# Patient Record
Sex: FEMALE | Race: BLACK OR AFRICAN AMERICAN | Employment: UNEMPLOYED | URBAN - METROPOLITAN AREA
[De-identification: names, ages, dates, MRNs, and addresses within clinical notes are randomized per-mention and may not be internally consistent; named-entity substitution may affect disease eponyms.]

---

## 2017-04-24 RX ORDER — METFORMIN HYDROCHLORIDE 500 MG/1
TABLET, EXTENDED RELEASE ORAL
Qty: 120 TAB | Refills: 4 | Status: SHIPPED | OUTPATIENT
Start: 2017-04-24 | End: 2018-07-09 | Stop reason: CLARIF

## 2017-04-24 NOTE — TELEPHONE ENCOUNTER
She has not been since since Aug 2012 and I do not plan to refill this unless she schedules an office visit. Will need a 40 min slot as it's been 5 years since I saw her and I can give her refills to last until the appointment.

## 2017-04-24 NOTE — TELEPHONE ENCOUNTER
Patient called to ask for a prescription to be called into her pharmacy for Metformin, due to PCOS. She can be reached at:  [phone]. [facility]  [address]  [address] [address] [facility] [phone]  Metformin

## 2017-07-15 ENCOUNTER — HOSPITAL ENCOUNTER (EMERGENCY)
Age: 37
Discharge: STILL ADMITTED OR ADMINISTRATIVE | End: 2017-07-15
Attending: EMERGENCY MEDICINE
Payer: COMMERCIAL

## 2017-07-15 PROCEDURE — 75810000275 HC EMERGENCY DEPT VISIT NO LEVEL OF CARE

## 2018-06-14 ENCOUNTER — HOSPITAL ENCOUNTER (OUTPATIENT)
Dept: ULTRASOUND IMAGING | Age: 38
Discharge: HOME | End: 2018-06-14
Attending: INTERNAL MEDICINE
Payer: COMMERCIAL

## 2018-06-14 DIAGNOSIS — Z83.71 FAMILY HISTORY OF COLONIC POLYPS: ICD-10-CM

## 2018-06-14 DIAGNOSIS — R10.11 RUQ ABDOMINAL PAIN: ICD-10-CM

## 2018-06-14 DIAGNOSIS — D50.9 IDA (IRON DEFICIENCY ANEMIA): ICD-10-CM

## 2018-06-14 PROCEDURE — 76700 US EXAM ABDOM COMPLETE: CPT

## 2018-06-21 ENCOUNTER — OFFICE VISIT (OUTPATIENT)
Dept: SURGERY | Age: 38
End: 2018-06-21

## 2018-06-21 VITALS
RESPIRATION RATE: 18 BRPM | TEMPERATURE: 98.3 F | OXYGEN SATURATION: 99 % | WEIGHT: 226 LBS | HEIGHT: 65 IN | HEART RATE: 69 BPM | DIASTOLIC BLOOD PRESSURE: 68 MMHG | SYSTOLIC BLOOD PRESSURE: 103 MMHG | BODY MASS INDEX: 37.65 KG/M2

## 2018-06-21 DIAGNOSIS — K80.20 GALLSTONES: Primary | ICD-10-CM

## 2018-06-21 NOTE — LETTER
DATE:  2018 TO WHOM IT MAY CONCERN: 
 
RE:  John CHAVEZ.:  1980 The above named patient was seen in this office on 2018 . The patient is scheduled for surgery on 2018 . The patient is under my care and should remain out of work from 2018 until approximately 2018 . The patient may return to full duty without restrictions on 2018 . If you have any questions regarding this patient, please feel free to contact our office at [phone] . Sincerely, 
  
 
Fide Lala MD

## 2018-06-21 NOTE — PROGRESS NOTES
Chief Complaint   Patient presents with    Abdominal Pain     evaluation for gallstones     1. Have you been to the ER, urgent care clinic since your last visit? Yes  7/2017   Hospitalized since your last visit? No      2. Have you seen or consulted any other health care providers outside of the [address] [place] [address] since your last visit? Dr. Phyllis Fowler    Patient had endoscopy & colonoscopy 6/18/18 with Dr. France [facility] on [address] [place] [address].     Polyp removed  Hernia  Abnormal cells in stomach

## 2018-06-21 NOTE — MR AVS SNAPSHOT
[address], [address] [address] 
[phone] Patient: Fernando Brandon MRN: [id number] Visit Information Date & Time Provider Department Dept. Phone Encounter #  
 6/21/2018 11:00 AM Coral Bahena MD [facility]"" [id number] Upcoming Health Maintenance Date Due  
 PAP AKA CERVICAL CYTOLOGY 12/10/2003 DTaP/Tdap/Td series (1 - Tdap) 9/11/2007 Influenza Age 5 to Adult 8/1/2018 Allergies as of 6/21/2018  Review Complete On: 6/21/2018 By: Eliane Trimble LPN No Known Allergies Current Immunizations  Reviewed on 9/5/2014 Name Date Influenza Vaccine Split 9/10/2012 TD Vaccine 9/10/2007 Not reviewed this visit Vitals BP Pulse Temp Resp Height(growth percentile) Weight(growth percentile) 103/68 (BP 1 Location: Right arm, BP Patient Position: Sitting) 69 98.3 °F (36.8 °C) (Oral) 18 5' 5\" (1.651 m) 226 lb (102.5 kg) LMP SpO2 BMI OB Status Smoking Status 05/11/2018 99% 37.61 kg/m2 Having regular periods Former Smoker Vitals History BMI and BSA Data Body Mass Index Body Surface Area  
 37.61 kg/m 2 2.17 m 2 Preferred Pharmacy Pharmacy Name Phone [id number] [phone] Your Updated Medication List  
  
   
This list is accurate as of 6/21/18  2:09 PM.  Always use your most recent med list.  
  
  
  
  
 metFORMIN  mg tablet Commonly known as:  GLUCOPHAGE XR Take 1 tab at dinner x 1-2 weeks, increase as tolerated to 4 tabs daily  
  
 naproxen 500 mg tablet Commonly known as:  NAPROSYN Take 1 tablet by mouth every twelve (12) hours as needed for Pain. predniSONE 5 mg dose pack Commonly known as:  STERAPRED See administration instruction per 5mg dose pack PRENATAL DHA+COMPLETE PRENATAL -300 mg-mcg-mg Cmpk Generic drug:  [id number] tommy-folic-dha Take  by mouth. traMADol 50 mg tablet Commonly known as:  ULTRAM  
Take 1 tablet by mouth every six (6) hours as needed for Pain. Introducing [facility]! Dear Ernst PERSAUD: 
Thank you for requesting a [facility] account. Our records indicate that you already have an active [facility] account. You can access your account anytime at [email or web address] Did you know that you can access your hospital and ER discharge instructions at any time in [facility]? You can also review all of your test results from your hospital stay or ER visit. Additional Information If you have questions, please visit the Frequently Asked Questions section of the [facility] website at [email or web address] Remember, [facility] is NOT to be used for urgent needs. For medical emergencies, dial 911. Now available from your iPhone and Android! Please provide this summary of care documentation to your next provider. Your primary care clinician is listed as Heidi Morales. If you have any questions after today's visit, please call [phone].

## 2018-07-02 ENCOUNTER — TELEPHONE (OUTPATIENT)
Dept: SURGERY | Age: 38
End: 2018-07-02

## 2018-07-02 NOTE — PROGRESS NOTES
To: Alli Jennings MD  CC:  Connie Clements MD      From: Mani Sullivan MD    Thank you for sending [facility] to see us. Encounter Date: 6/21/2018  History and Physical    Assessment:   Gallstones. Patient has failed conservative therapy and wishes to proceed with surgical intervention. Body mass index is 37.61 kg/(m^2). Status post right kidney donor. Plan/Recommendations/Medical Decision Making:   Cholecystectomy. Discussed the risk of surgery including infection, hematoma, bleeding, bile duct or bowel injury, recurrence or persistence of symptoms, conversion to an open operation, retained CBD stones, possible JOLIE drain, and the risks of general anesthetic. All questions were answered to the patient's satisfaction. Perioperative management of risks associated with donor kidney. HPI:   Patient is a 30 y.o. female who is seen in consultation at the request of Alli Jennings presents with abdominal pain. First noted symptoms 1.5 yrs ago. Has had 7 attacks. Onset of symptoms was gradual with gradually worsening course since that time. The pain is located in the epigastrium without radiation. Patient describes the pain as stabbing. No history of jaundice, angela urine, acholic stools.       Past Medical History:   Diagnosis Date    Abnormal Pap smear     2002, f/u WNL    Chlamydia     8/13    Diabetes ([address]     GDM - diet controlled    Genital herpes, unspecified 2002    no outbreaks in 10 years, taking valtrex    GERD (gastroesophageal reflux disease)     Hernia, hiatal 06/18/2018    Impaired glucose tolerance     Infertility, female     Took clomid for pregnancy    PCOS (polycystic ovarian syndrome)     Polycystic disease, ovaries     Unspecified vitamin D deficiency       Past Surgical History:   Procedure Laterality Date    HX NEPHRECTOMY  2003    patient donated her kidney    HX OTHER SURGICAL  2003    donated Kidney to father     HX OTHER SURGICAL  2012    tonsillectomy    HX POLYPECTOMY  06/18/2018    HX TONSILLECTOMY  2012    HX TRANSPLANT      donation of right kidney in 2003 to her father     Social History   Substance Use Topics    Smoking status: Former Smoker     Packs/day: 0.25     Years: 10.00     Quit date: 12/26/2013    Smokeless tobacco: Never Used    Alcohol use 0.5 oz/week     1 Standard drinks or equivalent per week      Comment: once a week      Family History   Problem Relation Age of Onset    Thyroid Disease Mother     Hypertension Father     Diabetes Father     Kidney Disease Father     Asthma Sister     MS Maternal Grandmother     Cancer Paternal Grandmother      endometrial    Diabetes Paternal Grandmother     Hypertension Paternal Grandmother     Heart Disease Paternal Uncle     Heart Attack Paternal Uncle        Current Outpatient Prescriptions   Medication Sig    metFORMIN ER (GLUCOPHAGE XR) 500 mg tablet Take 1 tab at dinner x 1-2 weeks, increase as tolerated to 4 tabs daily    traMADol (ULTRAM) 50 mg tablet Take 1 tablet by mouth every six (6) hours as needed for Pain.  naproxen (NAPROSYN) 500 mg tablet Take 1 tablet by mouth every twelve (12) hours as needed for Pain.  predniSONE (STERAPRED) 5 mg dose pack See administration instruction per 5mg dose pack    PNV no.24-iron-folic acid-dha (PRENATAL DHA+COMPLETE PRENATAL) -300 mg-mcg-mg cmpk Take  by mouth. No current facility-administered medications for this visit. Allergies:  No Known Allergies     Review of Systems:  10 systems reviewed. See scanned sheet in \"Media\" section. See HPI for pertinent positives and negatives.       Objective:     Visit Vitals    /68 (BP 1 Location: Right arm, BP Patient Position: Sitting)    Pulse 69    Temp 98.3 °F (36.8 °C) (Oral)    Resp 18    Ht 5' 5\" (1.651 m)    Wt 102.5 kg (226 lb)    LMP 05/11/2018    SpO2 99%    BMI 37.61 kg/m2     General appearance  Alert, cooperative, no distress, appears stated age   [de-identified]  Anicteric   Neck Supple   Back   No CVA tenderness   Lungs   CTAB   Heart  Regular rate and rhythm. No murmur, rub or gallop   Abdomen   Soft. Bowel sounds normal. No masses, no organomegaly. Presently NT. Extremities No CCE.    Pulses 2+ right radial   Skin Skin color, texture, turgor normal.        Neurologic Without overt sensory or motor deficit     Imaging and Lab Review:   images and reports reviewed    Signed By: Angel Logan MD     July 2, 2018

## 2018-07-02 NOTE — TELEPHONE ENCOUNTER
Returned call to patient. [facility] , forms received and will send out on 7/3/18. Patient notified and agreeable .

## 2018-07-09 ENCOUNTER — TELEPHONE (OUTPATIENT)
Dept: SURGERY | Age: 38
End: 2018-07-09

## 2018-07-09 NOTE — TELEPHONE ENCOUNTER
Patient has surgery on Wed, July 11.  She has misplaced her paperwork and wants to know what pertinent info she needs to know prior to surgery

## 2018-07-09 NOTE — PERIOP NOTES
[facility]  Preoperative Instructions        Surgery Date 7/11/18          Time of Arrival 1030 contact # [phone]    1. On the day of your surgery, please report to the Surgical Services Registration Desk and sign in at your designated time. The Surgery Center is located to the right of the Emergency Room. 2. You must have someone with you to drive you home. You should not drive a car for 24 hours following surgery. Please make arrangements for a friend or family member to stay with you for the first 24 hours after your surgery. 3. Do not have anything to eat or drink (including water, gum, mints, coffee, juice) after midnight 7/10/18  . ? This may not apply to medications prescribed by your physician. ?(Please note below the special instructions with medications to take the morning of your procedure.)    4. We recommend you do not drink any alcoholic beverages for 24 hours before and after your surgery. 5. Contact your surgeons office for instructions on the following medications: non-steroidal anti-inflammatory drugs (i.e. Advil, Aleve), vitamins, and supplements. (Some surgeons will want you to stop these medications prior to surgery and others may allow you to take them)  **If you are currently taking Plavix, Coumadin, Aspirin and/or other blood-thinning agents, contact your surgeon for instructions. ** Your surgeon will partner with the physician prescribing these medications to determine if it is safe to stop or if you need to continue taking. Please do not stop taking these medications without instructions from your surgeon    6. Wear comfortable clothes. Wear glasses instead of contacts. Do not bring any money or jewelry. Please bring picture ID, insurance card, and any prearranged co-payment or hospital payment. Do not wear make-up, particularly mascara the morning of your surgery. Do not wear nail polish, particularly if you are having foot /hand surgery. Wear your hair loose or down, no ponytails, buns, nati pins or clips. All body piercings must be removed. Please shower with antibacterial soap for three consecutive days before and on the morning of surgery, but do not apply any lotions, powders or deodorants after the shower on the day of surgery. Please use a fresh towels after each shower. Please sleep in clean clothes and change bed linens the night before surgery. Please do not shave for 48 hours prior to surgery. Shaving of the face is acceptable. 7. You should understand that if you do not follow these instructions your surgery may be cancelled. If your physical condition changes (I.e. fever, cold or flu) please contact your surgeon as soon as possible. 8. It is important that you be on time. If a situation occurs where you may be late, please call [phone] (OR Holding Area). 9. If you have any questions and or problems, please call [phone] (Pre-admission Testing). 10. Your surgery time may be subject to change. You will receive a phone call the evening prior if your time changes. 11.  If having outpatient surgery, you must have someone to drive you here, stay with you during the duration of your stay, and to drive you home at time of discharge. 12.   In an effort to improve the efficiency, privacy, and safety for all of our Pre-op patients visitors are not allowed in the Holding area. Once you arrive and are registered your family/visitors will be asked to remain in the waiting room. The Pre-op staff will get you from the Surgical Waiting Area and will explain to you and your family/visitors that the Pre-op phase is beginning. The staff will answer any questions and provide instructions for tracking of the patient, by use of the existing tracking number and color-coded status board in the waiting room.   At this time the staff will also ask for your designated spokesperson information in the event that the physician or staff need to provide an update or obtain any pertinent information. The designated spokesperson will be notified if the physician needs to speak to family during the pre-operative phase. If at any time your family/visitors has questions or concerns they may approach the volunteer desk in the waiting area for assistance. Special Instructions: none     MEDICATIONS TO TAKE THE MORNING OF SURGERY WITH A SIP OF WATER: none       I understand a pre-operative phone call will be made to verify my surgery time. In the event that I am not available, I give permission for a message to be left on my answering service and/or with another person?   yes         ___________________      __________   _________    (Signature of Patient)             (Witness)                (Date and Time)

## 2018-07-11 ENCOUNTER — HOSPITAL ENCOUNTER (OUTPATIENT)
Age: 38
Setting detail: OUTPATIENT SURGERY
Discharge: HOME | End: 2018-07-11
Attending: SURGERY | Admitting: SURGERY
Payer: COMMERCIAL

## 2018-07-11 ENCOUNTER — ANESTHESIA EVENT (OUTPATIENT)
Dept: SURGERY | Age: 38
End: 2018-07-11
Payer: COMMERCIAL

## 2018-07-11 ENCOUNTER — APPOINTMENT (OUTPATIENT)
Dept: GENERAL RADIOLOGY | Age: 38
End: 2018-07-11
Attending: SURGERY
Payer: COMMERCIAL

## 2018-07-11 ENCOUNTER — ANESTHESIA (OUTPATIENT)
Dept: SURGERY | Age: 38
End: 2018-07-11
Payer: COMMERCIAL

## 2018-07-11 VITALS
HEART RATE: 63 BPM | SYSTOLIC BLOOD PRESSURE: 105 MMHG | HEIGHT: 66 IN | DIASTOLIC BLOOD PRESSURE: 57 MMHG | BODY MASS INDEX: 36.95 KG/M2 | WEIGHT: 229.94 LBS | TEMPERATURE: 97.7 F | OXYGEN SATURATION: 99 % | RESPIRATION RATE: 12 BRPM

## 2018-07-11 DIAGNOSIS — K80.20 GALLSTONES: Primary | ICD-10-CM

## 2018-07-11 LAB
ANION GAP SERPL CALC-SCNC: 7 MMOL/L (ref 5–15)
BUN SERPL-MCNC: 10 MG/DL (ref 6–20)
BUN/CREAT SERPL: 10 (ref 12–20)
CALCIUM SERPL-MCNC: 9.1 MG/DL (ref 8.5–10.1)
CHLORIDE SERPL-SCNC: 107 MMOL/L (ref 97–108)
CO2 SERPL-SCNC: 25 MMOL/L (ref 21–32)
CREAT SERPL-MCNC: 0.96 MG/DL (ref 0.55–1.02)
ERYTHROCYTE [DISTWIDTH] IN BLOOD BY AUTOMATED COUNT: 17.8 % (ref 11.5–14.5)
GLUCOSE BLD STRIP.AUTO-MCNC: 86 MG/DL (ref 65–100)
GLUCOSE SERPL-MCNC: 77 MG/DL (ref 65–100)
HCG UR QL: NEGATIVE
HCT VFR BLD AUTO: 36.1 % (ref 35–47)
HGB BLD-MCNC: 11.4 G/DL (ref 11.5–16)
MCH RBC QN AUTO: 26.2 PG (ref 26–34)
MCHC RBC AUTO-ENTMCNC: 31.6 G/DL (ref 30–36.5)
MCV RBC AUTO: 83 FL (ref 80–99)
NRBC # BLD: 0 K/UL (ref 0–0.01)
NRBC BLD-RTO: 0 PER 100 WBC
PLATELET # BLD AUTO: 440 K/UL (ref 150–400)
PMV BLD AUTO: 10.4 FL (ref 8.9–12.9)
POTASSIUM SERPL-SCNC: 4.1 MMOL/L (ref 3.5–5.1)
RBC # BLD AUTO: 4.35 M/UL (ref 3.8–5.2)
SERVICE CMNT-IMP: NORMAL
SODIUM SERPL-SCNC: 139 MMOL/L (ref 136–145)
WBC # BLD AUTO: 8.6 K/UL (ref 3.6–11)

## 2018-07-11 PROCEDURE — 74011250636 HC RX REV CODE- 250/636

## 2018-07-11 PROCEDURE — 74011000250 HC RX REV CODE- 250

## 2018-07-11 PROCEDURE — 77030035045 HC TRCR ENDOSC VRSPRT BLDLSS COVD -B: Performed by: SURGERY

## 2018-07-11 PROCEDURE — 77030013079 HC BLNKT BAIR HGGR 3M -A: Performed by: ANESTHESIOLOGY

## 2018-07-11 PROCEDURE — 77030020256 HC SOL INJ NACL 0.9%  500ML: Performed by: SURGERY

## 2018-07-11 PROCEDURE — 76210000021 HC REC RM PH II 0.5 TO 1 HR: Performed by: SURGERY

## 2018-07-11 PROCEDURE — 74011250636 HC RX REV CODE- 250/636: Performed by: ANESTHESIOLOGY

## 2018-07-11 PROCEDURE — 74011250636 HC RX REV CODE- 250/636: Performed by: SURGERY

## 2018-07-11 PROCEDURE — 77030008756 HC TU IRR SUC STRY -B: Performed by: SURGERY

## 2018-07-11 PROCEDURE — 82962 GLUCOSE BLOOD TEST: CPT

## 2018-07-11 PROCEDURE — 36415 COLL VENOUS BLD VENIPUNCTURE: CPT | Performed by: SURGERY

## 2018-07-11 PROCEDURE — 76060000033 HC ANESTHESIA 1 TO 1.5 HR: Performed by: SURGERY

## 2018-07-11 PROCEDURE — 77030037892: Performed by: SURGERY

## 2018-07-11 PROCEDURE — 76210000016 HC OR PH I REC 1 TO 1.5 HR: Performed by: SURGERY

## 2018-07-11 PROCEDURE — 74300 X-RAY BILE DUCTS/PANCREAS: CPT

## 2018-07-11 PROCEDURE — 77030032490 HC SLV COMPR SCD KNE COVD -B: Performed by: SURGERY

## 2018-07-11 PROCEDURE — 77030004813 HC CATH CHOLGM TELE -B: Performed by: SURGERY

## 2018-07-11 PROCEDURE — 77030035048 HC TRCR ENDOSC OPTCL COVD -B: Performed by: SURGERY

## 2018-07-11 PROCEDURE — 74011636320 HC RX REV CODE- 636/320: Performed by: SURGERY

## 2018-07-11 PROCEDURE — 88304 TISSUE EXAM BY PATHOLOGIST: CPT | Performed by: SURGERY

## 2018-07-11 PROCEDURE — 77030008771 HC TU NG SALEM SUMP -A: Performed by: ANESTHESIOLOGY

## 2018-07-11 PROCEDURE — 77030002916 HC SUT ETHLN J&J -A: Performed by: SURGERY

## 2018-07-11 PROCEDURE — 77030002933 HC SUT MCRYL J&J -A: Performed by: SURGERY

## 2018-07-11 PROCEDURE — 74011000250 HC RX REV CODE- 250: Performed by: SURGERY

## 2018-07-11 PROCEDURE — 81025 URINE PREGNANCY TEST: CPT

## 2018-07-11 PROCEDURE — 77030008684 HC TU ET CUF COVD -B: Performed by: ANESTHESIOLOGY

## 2018-07-11 PROCEDURE — 76010000149 HC OR TIME 1 TO 1.5 HR: Performed by: SURGERY

## 2018-07-11 PROCEDURE — 77030039266 HC ADH SKN EXOFIN S2SG -A: Performed by: SURGERY

## 2018-07-11 PROCEDURE — 85027 COMPLETE CBC AUTOMATED: CPT | Performed by: SURGERY

## 2018-07-11 PROCEDURE — 77030031139 HC SUT VCRL2 J&J -A: Performed by: SURGERY

## 2018-07-11 PROCEDURE — 77030020053 HC ELECTRD LAPSCP COVD -B: Performed by: SURGERY

## 2018-07-11 PROCEDURE — 80048 BASIC METABOLIC PNL TOTAL CA: CPT | Performed by: SURGERY

## 2018-07-11 PROCEDURE — 77030020782 HC GWN BAIR PAWS FLX 3M -B

## 2018-07-11 PROCEDURE — 77030011640 HC PAD GRND REM COVD -A: Performed by: SURGERY

## 2018-07-11 PROCEDURE — 77030010514 HC APPL CLP LIG COVD -B: Performed by: SURGERY

## 2018-07-11 PROCEDURE — 77030019908 HC STETH ESOPH SIMS -A: Performed by: ANESTHESIOLOGY

## 2018-07-11 PROCEDURE — 77030026438 HC STYL ET INTUB CARD -A: Performed by: ANESTHESIOLOGY

## 2018-07-11 PROCEDURE — 74011250637 HC RX REV CODE- 250/637: Performed by: SURGERY

## 2018-07-11 RX ORDER — OXYCODONE HYDROCHLORIDE 5 MG/1
5 TABLET ORAL AS NEEDED
Status: DISCONTINUED | OUTPATIENT
Start: 2018-07-11 | End: 2018-07-11 | Stop reason: HOSPADM

## 2018-07-11 RX ORDER — ROCURONIUM BROMIDE 10 MG/ML
INJECTION, SOLUTION INTRAVENOUS AS NEEDED
Status: DISCONTINUED | OUTPATIENT
Start: 2018-07-11 | End: 2018-07-11 | Stop reason: HOSPADM

## 2018-07-11 RX ORDER — SODIUM CHLORIDE, SODIUM LACTATE, POTASSIUM CHLORIDE, CALCIUM CHLORIDE 600; 310; 30; 20 MG/100ML; MG/100ML; MG/100ML; MG/100ML
25 INJECTION, SOLUTION INTRAVENOUS CONTINUOUS
Status: DISCONTINUED | OUTPATIENT
Start: 2018-07-11 | End: 2018-07-11 | Stop reason: HOSPADM

## 2018-07-11 RX ORDER — DIPHENHYDRAMINE HYDROCHLORIDE 50 MG/ML
12.5 INJECTION, SOLUTION INTRAMUSCULAR; INTRAVENOUS AS NEEDED
Status: DISCONTINUED | OUTPATIENT
Start: 2018-07-11 | End: 2018-07-11 | Stop reason: HOSPADM

## 2018-07-11 RX ORDER — SODIUM CHLORIDE 0.9 % (FLUSH) 0.9 %
5-10 SYRINGE (ML) INJECTION AS NEEDED
Status: DISCONTINUED | OUTPATIENT
Start: 2018-07-11 | End: 2018-07-11 | Stop reason: HOSPADM

## 2018-07-11 RX ORDER — LIDOCAINE HYDROCHLORIDE 20 MG/ML
INJECTION, SOLUTION EPIDURAL; INFILTRATION; INTRACAUDAL; PERINEURAL AS NEEDED
Status: DISCONTINUED | OUTPATIENT
Start: 2018-07-11 | End: 2018-07-11 | Stop reason: HOSPADM

## 2018-07-11 RX ORDER — NEOSTIGMINE METHYLSULFATE 1 MG/ML
INJECTION INTRAVENOUS AS NEEDED
Status: DISCONTINUED | OUTPATIENT
Start: 2018-07-11 | End: 2018-07-11 | Stop reason: HOSPADM

## 2018-07-11 RX ORDER — ACETAMINOPHEN 10 MG/ML
INJECTION, SOLUTION INTRAVENOUS AS NEEDED
Status: DISCONTINUED | OUTPATIENT
Start: 2018-07-11 | End: 2018-07-11 | Stop reason: HOSPADM

## 2018-07-11 RX ORDER — GLYCOPYRROLATE 0.2 MG/ML
INJECTION INTRAMUSCULAR; INTRAVENOUS AS NEEDED
Status: DISCONTINUED | OUTPATIENT
Start: 2018-07-11 | End: 2018-07-11 | Stop reason: HOSPADM

## 2018-07-11 RX ORDER — CEFAZOLIN SODIUM/WATER 2 G/20 ML
2 SYRINGE (ML) INTRAVENOUS ONCE
Status: COMPLETED | OUTPATIENT
Start: 2018-07-11 | End: 2018-07-11

## 2018-07-11 RX ORDER — HYDROMORPHONE HYDROCHLORIDE 1 MG/ML
0.2 INJECTION, SOLUTION INTRAMUSCULAR; INTRAVENOUS; SUBCUTANEOUS
Status: DISCONTINUED | OUTPATIENT
Start: 2018-07-11 | End: 2018-07-11 | Stop reason: HOSPADM

## 2018-07-11 RX ORDER — FENTANYL CITRATE 50 UG/ML
25 INJECTION, SOLUTION INTRAMUSCULAR; INTRAVENOUS
Status: DISCONTINUED | OUTPATIENT
Start: 2018-07-11 | End: 2018-07-11 | Stop reason: HOSPADM

## 2018-07-11 RX ORDER — MIDAZOLAM HYDROCHLORIDE 1 MG/ML
0.5 INJECTION, SOLUTION INTRAMUSCULAR; INTRAVENOUS
Status: DISCONTINUED | OUTPATIENT
Start: 2018-07-11 | End: 2018-07-11 | Stop reason: HOSPADM

## 2018-07-11 RX ORDER — IBUPROFEN 800 MG/1
800 TABLET ORAL
Qty: 21 TAB | Refills: 0 | Status: SHIPPED | OUTPATIENT
Start: 2018-07-11 | End: 2020-03-12

## 2018-07-11 RX ORDER — SODIUM CHLORIDE 0.9 % (FLUSH) 0.9 %
5-10 SYRINGE (ML) INJECTION EVERY 8 HOURS
Status: DISCONTINUED | OUTPATIENT
Start: 2018-07-11 | End: 2018-07-11 | Stop reason: HOSPADM

## 2018-07-11 RX ORDER — MIDAZOLAM HYDROCHLORIDE 1 MG/ML
1 INJECTION, SOLUTION INTRAMUSCULAR; INTRAVENOUS AS NEEDED
Status: DISCONTINUED | OUTPATIENT
Start: 2018-07-11 | End: 2018-07-11 | Stop reason: HOSPADM

## 2018-07-11 RX ORDER — FENTANYL CITRATE 50 UG/ML
INJECTION, SOLUTION INTRAMUSCULAR; INTRAVENOUS AS NEEDED
Status: DISCONTINUED | OUTPATIENT
Start: 2018-07-11 | End: 2018-07-11 | Stop reason: HOSPADM

## 2018-07-11 RX ORDER — PROPOFOL 10 MG/ML
INJECTION, EMULSION INTRAVENOUS AS NEEDED
Status: DISCONTINUED | OUTPATIENT
Start: 2018-07-11 | End: 2018-07-11 | Stop reason: HOSPADM

## 2018-07-11 RX ORDER — SODIUM CHLORIDE, SODIUM LACTATE, POTASSIUM CHLORIDE, CALCIUM CHLORIDE 600; 310; 30; 20 MG/100ML; MG/100ML; MG/100ML; MG/100ML
125 INJECTION, SOLUTION INTRAVENOUS CONTINUOUS
Status: DISCONTINUED | OUTPATIENT
Start: 2018-07-11 | End: 2018-07-11 | Stop reason: HOSPADM

## 2018-07-11 RX ORDER — MIDAZOLAM HYDROCHLORIDE 1 MG/ML
INJECTION, SOLUTION INTRAMUSCULAR; INTRAVENOUS AS NEEDED
Status: DISCONTINUED | OUTPATIENT
Start: 2018-07-11 | End: 2018-07-11 | Stop reason: HOSPADM

## 2018-07-11 RX ORDER — MORPHINE SULFATE 10 MG/ML
2 INJECTION, SOLUTION INTRAMUSCULAR; INTRAVENOUS
Status: DISCONTINUED | OUTPATIENT
Start: 2018-07-11 | End: 2018-07-11 | Stop reason: HOSPADM

## 2018-07-11 RX ORDER — HYDROCODONE BITARTRATE AND ACETAMINOPHEN 5; 325 MG/1; MG/1
1 TABLET ORAL ONCE
Status: COMPLETED | OUTPATIENT
Start: 2018-07-11 | End: 2018-07-11

## 2018-07-11 RX ORDER — SUCCINYLCHOLINE CHLORIDE 20 MG/ML
INJECTION INTRAMUSCULAR; INTRAVENOUS AS NEEDED
Status: DISCONTINUED | OUTPATIENT
Start: 2018-07-11 | End: 2018-07-11 | Stop reason: HOSPADM

## 2018-07-11 RX ORDER — LIDOCAINE HYDROCHLORIDE 10 MG/ML
0.1 INJECTION, SOLUTION EPIDURAL; INFILTRATION; INTRACAUDAL; PERINEURAL AS NEEDED
Status: DISCONTINUED | OUTPATIENT
Start: 2018-07-11 | End: 2018-07-11 | Stop reason: HOSPADM

## 2018-07-11 RX ORDER — SODIUM CHLORIDE 9 MG/ML
25 INJECTION, SOLUTION INTRAVENOUS CONTINUOUS
Status: DISCONTINUED | OUTPATIENT
Start: 2018-07-11 | End: 2018-07-11 | Stop reason: HOSPADM

## 2018-07-11 RX ORDER — DEXAMETHASONE SODIUM PHOSPHATE 100 MG/10ML
INJECTION INTRAMUSCULAR; INTRAVENOUS AS NEEDED
Status: DISCONTINUED | OUTPATIENT
Start: 2018-07-11 | End: 2018-07-11 | Stop reason: HOSPADM

## 2018-07-11 RX ORDER — FENTANYL CITRATE 50 UG/ML
50 INJECTION, SOLUTION INTRAMUSCULAR; INTRAVENOUS AS NEEDED
Status: DISCONTINUED | OUTPATIENT
Start: 2018-07-11 | End: 2018-07-11 | Stop reason: HOSPADM

## 2018-07-11 RX ORDER — POLYETHYLENE GLYCOL 3350 17 G/17G
17 POWDER, FOR SOLUTION ORAL DAILY
Qty: 510 G | Refills: 0 | Status: SHIPPED | OUTPATIENT
Start: 2018-07-11 | End: 2020-03-12

## 2018-07-11 RX ORDER — HYDROMORPHONE HYDROCHLORIDE 2 MG/ML
INJECTION, SOLUTION INTRAMUSCULAR; INTRAVENOUS; SUBCUTANEOUS AS NEEDED
Status: DISCONTINUED | OUTPATIENT
Start: 2018-07-11 | End: 2018-07-11 | Stop reason: HOSPADM

## 2018-07-11 RX ORDER — BUPIVACAINE HYDROCHLORIDE AND EPINEPHRINE 5; 5 MG/ML; UG/ML
INJECTION, SOLUTION EPIDURAL; INTRACAUDAL; PERINEURAL AS NEEDED
Status: DISCONTINUED | OUTPATIENT
Start: 2018-07-11 | End: 2018-07-11 | Stop reason: HOSPADM

## 2018-07-11 RX ORDER — ONDANSETRON 2 MG/ML
4 INJECTION INTRAMUSCULAR; INTRAVENOUS AS NEEDED
Status: DISCONTINUED | OUTPATIENT
Start: 2018-07-11 | End: 2018-07-11 | Stop reason: HOSPADM

## 2018-07-11 RX ORDER — HYDROCODONE BITARTRATE AND ACETAMINOPHEN 5; 325 MG/1; MG/1
1-2 TABLET ORAL
Qty: 40 TAB | Refills: 0 | Status: SHIPPED | OUTPATIENT
Start: 2018-07-11 | End: 2020-03-12

## 2018-07-11 RX ORDER — ONDANSETRON 2 MG/ML
INJECTION INTRAMUSCULAR; INTRAVENOUS AS NEEDED
Status: DISCONTINUED | OUTPATIENT
Start: 2018-07-11 | End: 2018-07-11 | Stop reason: HOSPADM

## 2018-07-11 RX ADMIN — HYDROMORPHONE HYDROCHLORIDE 0.2 MG: 2 INJECTION, SOLUTION INTRAMUSCULAR; INTRAVENOUS; SUBCUTANEOUS at 13:13

## 2018-07-11 RX ADMIN — PROPOFOL 30 MG: 10 INJECTION, EMULSION INTRAVENOUS at 13:38

## 2018-07-11 RX ADMIN — FENTANYL CITRATE 50 MCG: 50 INJECTION, SOLUTION INTRAMUSCULAR; INTRAVENOUS at 13:33

## 2018-07-11 RX ADMIN — FENTANYL CITRATE 25 MCG: 50 INJECTION, SOLUTION INTRAMUSCULAR; INTRAVENOUS at 15:02

## 2018-07-11 RX ADMIN — LIDOCAINE HYDROCHLORIDE 100 MG: 20 INJECTION, SOLUTION EPIDURAL; INFILTRATION; INTRACAUDAL; PERINEURAL at 13:26

## 2018-07-11 RX ADMIN — ROCURONIUM BROMIDE 10 MG: 10 INJECTION, SOLUTION INTRAVENOUS at 13:41

## 2018-07-11 RX ADMIN — FENTANYL CITRATE 50 MCG: 50 INJECTION, SOLUTION INTRAMUSCULAR; INTRAVENOUS at 13:26

## 2018-07-11 RX ADMIN — Medication 2 G: at 13:33

## 2018-07-11 RX ADMIN — DEXAMETHASONE SODIUM PHOSPHATE 4 MG: 100 INJECTION INTRAMUSCULAR; INTRAVENOUS at 13:38

## 2018-07-11 RX ADMIN — GLYCOPYRROLATE 0.5 MG: 0.2 INJECTION INTRAMUSCULAR; INTRAVENOUS at 14:05

## 2018-07-11 RX ADMIN — ROCURONIUM BROMIDE 10 MG: 10 INJECTION, SOLUTION INTRAVENOUS at 13:33

## 2018-07-11 RX ADMIN — ROCURONIUM BROMIDE 10 MG: 10 INJECTION, SOLUTION INTRAVENOUS at 13:26

## 2018-07-11 RX ADMIN — SUCCINYLCHOLINE CHLORIDE 180 MG: 20 INJECTION INTRAMUSCULAR; INTRAVENOUS at 13:26

## 2018-07-11 RX ADMIN — ONDANSETRON 4 MG: 2 INJECTION INTRAMUSCULAR; INTRAVENOUS at 13:57

## 2018-07-11 RX ADMIN — ACETAMINOPHEN 1000 MG: 10 INJECTION, SOLUTION INTRAVENOUS at 14:13

## 2018-07-11 RX ADMIN — HYDROCODONE BITARTRATE AND ACETAMINOPHEN 1 TABLET: 5; 325 TABLET ORAL at 15:16

## 2018-07-11 RX ADMIN — PROPOFOL 250 MG: 10 INJECTION, EMULSION INTRAVENOUS at 13:26

## 2018-07-11 RX ADMIN — MIDAZOLAM HYDROCHLORIDE 2 MG: 1 INJECTION, SOLUTION INTRAMUSCULAR; INTRAVENOUS at 13:13

## 2018-07-11 RX ADMIN — NEOSTIGMINE METHYLSULFATE 3 MG: 1 INJECTION INTRAVENOUS at 14:05

## 2018-07-11 RX ADMIN — SODIUM CHLORIDE, SODIUM LACTATE, POTASSIUM CHLORIDE, AND CALCIUM CHLORIDE 25 ML/HR: 600; 310; 30; 20 INJECTION, SOLUTION INTRAVENOUS at 11:20

## 2018-07-11 NOTE — H&P (VIEW-ONLY)
To: Rosa Riggs MD  CC:  Kecia Wong MD      From: Ruba Cook MD    Thank you for sending [facility] to see us. Encounter Date: 6/21/2018  History and Physical    Assessment:   Gallstones. Patient has failed conservative therapy and wishes to proceed with surgical intervention. Body mass index is 37.61 kg/(m^2). Status post right kidney donor. Plan/Recommendations/Medical Decision Making:   Cholecystectomy. Discussed the risk of surgery including infection, hematoma, bleeding, bile duct or bowel injury, recurrence or persistence of symptoms, conversion to an open operation, retained CBD stones, possible JOLIE drain, and the risks of general anesthetic. All questions were answered to the patient's satisfaction. Perioperative management of risks associated with donor kidney. HPI:   Patient is a 30 y.o. female who is seen in consultation at the request of Rosa Riggs presents with abdominal pain. First noted symptoms 1.5 yrs ago. Has had 7 attacks. Onset of symptoms was gradual with gradually worsening course since that time. The pain is located in the epigastrium without radiation. Patient describes the pain as stabbing. No history of jaundice, angela urine, acholic stools.       Past Medical History:   Diagnosis Date    Abnormal Pap smear     2002, f/u WNL    Chlamydia     8/13    Diabetes ([address]     GDM - diet controlled    Genital herpes, unspecified 2002    no outbreaks in 10 years, taking valtrex    GERD (gastroesophageal reflux disease)     Hernia, hiatal 06/18/2018    Impaired glucose tolerance     Infertility, female     Took clomid for pregnancy    PCOS (polycystic ovarian syndrome)     Polycystic disease, ovaries     Unspecified vitamin D deficiency       Past Surgical History:   Procedure Laterality Date    HX NEPHRECTOMY  2003    patient donated her kidney    HX OTHER SURGICAL  2003    donated Kidney to father     HX OTHER SURGICAL  2012    tonsillectomy    HX POLYPECTOMY  06/18/2018    HX TONSILLECTOMY  2012    HX TRANSPLANT      donation of right kidney in 2003 to her father     Social History   Substance Use Topics    Smoking status: Former Smoker     Packs/day: 0.25     Years: 10.00     Quit date: 12/26/2013    Smokeless tobacco: Never Used    Alcohol use 0.5 oz/week     1 Standard drinks or equivalent per week      Comment: once a week      Family History   Problem Relation Age of Onset    Thyroid Disease Mother     Hypertension Father     Diabetes Father     Kidney Disease Father     Asthma Sister     MS Maternal Grandmother     Cancer Paternal Grandmother      endometrial    Diabetes Paternal Grandmother     Hypertension Paternal Grandmother     Heart Disease Paternal Uncle     Heart Attack Paternal Uncle        Current Outpatient Prescriptions   Medication Sig    metFORMIN ER (GLUCOPHAGE XR) 500 mg tablet Take 1 tab at dinner x 1-2 weeks, increase as tolerated to 4 tabs daily    traMADol (ULTRAM) 50 mg tablet Take 1 tablet by mouth every six (6) hours as needed for Pain.  naproxen (NAPROSYN) 500 mg tablet Take 1 tablet by mouth every twelve (12) hours as needed for Pain.  predniSONE (STERAPRED) 5 mg dose pack See administration instruction per 5mg dose pack    PNV no.24-iron-folic acid-dha (PRENATAL DHA+COMPLETE PRENATAL) -300 mg-mcg-mg cmpk Take  by mouth. No current facility-administered medications for this visit. Allergies:  No Known Allergies     Review of Systems:  10 systems reviewed. See scanned sheet in \"Media\" section. See HPI for pertinent positives and negatives.       Objective:     Visit Vitals    /68 (BP 1 Location: Right arm, BP Patient Position: Sitting)    Pulse 69    Temp 98.3 °F (36.8 °C) (Oral)    Resp 18    Ht 5' 5\" (1.651 m)    Wt 102.5 kg (226 lb)    LMP 05/11/2018    SpO2 99%    BMI 37.61 kg/m2     General appearance  Alert, cooperative, no distress, appears stated age   [de-identified]  Anicteric   Neck Supple   Back   No CVA tenderness   Lungs   CTAB   Heart  Regular rate and rhythm. No murmur, rub or gallop   Abdomen   Soft. Bowel sounds normal. No masses, no organomegaly. Presently NT. Extremities No CCE.    Pulses 2+ right radial   Skin Skin color, texture, turgor normal.        Neurologic Without overt sensory or motor deficit     Imaging and Lab Review:   images and reports reviewed    Signed By: Ambar Hickey MD     July 2, 2018

## 2018-07-11 NOTE — DISCHARGE INSTRUCTIONS
Discharge Instructions:  Laparoscopic Cholecystectomy (Gallbladder Removal)  Dr. Bernardino Brannon    Call on next business day to arrange appointment for follow up in 3 weeks -- [phone]. Activity:  Walk regularly - can walk today as tolerated, but make yourself walk at least 50 yards at least 6 times per day starting tomorrow. No lifting more than 10 -15 pounds for 4 weeks. You may resume driving in 5-7 days unless still requiring narcotics for pain. Work:  You may return to work in 1 or 2 weeks to light activity. No lifting more than 10 pounds (=\"light duty\") for four weeks. If your employer can not accommodate \"light duty,\" your employer will need to provide any necessary paperwork to our office. This document with serve as the initial \"note\" to your employer. Diet:  You may resume normal diet after 24 hours. Fatty foods may still cause some stomach upset. Avoid fatty/greasy foods for 1 month, then add back slowly. Wound Care:  Dermabond glue dressing will fall off over the next couple of weeks. It is waterproof. You may shower, but no swimming or baths for 2 weeks. Your incisions may ooze for a few days. Do not worry about this. If you experience a lot of drainage, develop redness around the wound, or a fever over 101 F occurs please call the office. Medications:  See attached \"Medication Reconciliation. \"    Resume home medications as indicated on the Medical Reconciliation form. Do not use blood thinners (such as Aspirin, Coumadin, or Plavix) until 3 days after surgery. Pain medications:  Non steroidal antiinflammatories (ibuprofen -- Advil or Motrin) seem to work best for post surgical pain. Try these first.  A narcotic prescription will also be given for breakthrough pain. Do not use Tylenol while taking the narcotic because there is Tylenol in the narcotic pill, and too much Tylenol can injure your liver.        PUT ICE ON YOUR INCISIONS 20 MINUTES EVERY HOUR WHILE THEY REMAIN SORE. PLACE A CLOTH BETWEEN YOUR SKIN THE THE ICE BAG. Colace or Miralax should be used twice daily to prevent constipation while on narcotics. If you are still having trouble having a BM after 1-2 days, try milk of magnesia. If this does not work within 24 hours, try a bottle of magnesium citrate. Narcotics and anesthesia sometimes cause nausea and vomiting. If persistent please call the office. Do not hesitate to call with questions or concerns. Violet Mcadams MD  [facility]. Tel. [phone]  Fax [phone] from Nurse    PATIENT INSTRUCTIONS:    After general anesthesia or intravenous sedation, for 24 hours or while taking prescription Narcotics:  · Limit your activities  · Do not drive and operate hazardous machinery  · Do not make important personal or business decisions  · Do  not drink alcoholic beverages  · If you have not urinated within 8 hours after discharge, please contact your surgeon on call. Report the following to your surgeon:  · Excessive pain, swelling, redness or odor of or around the surgical area  · Temperature over 100.5  · Nausea and vomiting lasting longer than 4 hours or if unable to take medications  · Any signs of decreased circulation or nerve impairment to extremity: change in color, persistent  numbness, tingling, coldness or increase pain  · Any questions    What to do at Home:    *  Please give a list of your current medications to your Primary Care Provider. *  Please update this list whenever your medications are discontinued, doses are      changed, or new medications (including over-the-counter products) are added. *  Please carry medication information at all times in case of emergency situations.     These are general instructions for a healthy lifestyle:    No smoking/ No tobacco products/ Avoid exposure to second hand smoke  Surgeon General's Warning:  Quitting smoking now greatly reduces serious risk to your health. Obesity, smoking, and sedentary lifestyle greatly increases your risk for illness    A healthy diet, regular physical exercise & weight monitoring are important for maintaining a healthy lifestyle    You may be retaining fluid if you have a history of heart failure or if you experience any of the following symptoms:  Weight gain of 3 pounds or more overnight or 5 pounds in a week, increased swelling in our hands or feet or shortness of breath while lying flat in bed. Please call your doctor as soon as you notice any of these symptoms; do not wait until your next office visit. Recognize signs and symptoms of STROKE:    F-face looks uneven    A-arms unable to move or move unevenly    S-speech slurred or non-existent    T-time-call 911 as soon as signs and symptoms begin-DO NOT go       Back to bed or wait to see if you get better-TIME IS BRAIN. Warning Signs of HEART ATTACK     Call 911 if you have these symptoms:   Chest discomfort. Most heart attacks involve discomfort in the center of the chest that lasts more than a few minutes, or that goes away and comes back. It can feel like uncomfortable pressure, squeezing, fullness, or pain.  Discomfort in other areas of the upper body. Symptoms can include pain or discomfort in one or both arms, the back, neck, jaw, or stomach.  Shortness of breath with or without chest discomfort.  Other signs may include breaking out in a cold sweat, nausea, or lightheadedness. Don't wait more than five minutes to call 911 - MINUTES MATTER! Fast action can save your life. Calling 911 is almost always the fastest way to get lifesaving treatment. Emergency Medical Services staff can begin treatment when they arrive -- up to an hour sooner than if someone gets to the hospital by car. The discharge information has been reviewed with the patient and caregiver. The patient and caregiver verbalized understanding.   Discharge medications reviewed with the patient and caregiver and appropriate educational materials and side effects teaching were provided. ___________________________________________________________________________________________________________________________________     A common side effect of anesthesia following surgery is nausea and/or vomiting. In order to decrease symptoms, it is wise to avoid foods that are high in fat, greasy foods, milk products, and spicy foods for the first 24 hours. Acceptable foods for the first 24 hours following surgery include but are not limited to:     soup   broth    toast    crackers    applesauce    bananas    mashed potatoes,   soft or scrambled eggs   oatmeal    jello    It is important to eat when taking your pain medication. This will help to prevent nausea. If possible, please try to time your meals with your medications. It is very important to stay hydrated following surgery. Sip fluids frequently while awake. Avoid acidic drinks such as citrus juices and soda for 24 hours. Carbonated beverages may cause bloating and gas. Acceptable fluids include:    - water (flavor packets may add variety)  - coffee or tea (in moderation)  - Gatorade  - Pierce-aid  - apple juice  - cranberry juice    You are encouraged to cough and deep breathe every hour when awake. This will help to prevent respiratory complications following anesthesia. You may want to hug a pillow when coughing and sneezing to add additional support to the surgical area and to decrease discomfort if you had abdominal or chest surgery. If you are discharged home with support stockings, you may remove them after 24 hours. Support stockings are used to help prevent blood clots in the legs following surgery. Please take time to review all of your Home Care Instructions and Medication Information sheets provided in your discharge packet. If you have any questions, please contact your surgeons office. Thank you. A common side effect of anesthesia following surgery is nausea and/or vomiting. In order to decrease symptoms, it is wise to avoid foods that are high in fat, greasy foods, milk products, and spicy foods for the first 24 hours. Acceptable foods for the first 24 hours following surgery include but are not limited to:     soup   broth    toast    crackers    applesauce    bananas    mashed potatoes,   soft or scrambled eggs   oatmeal    jello    It is important to eat when taking your pain medication. This will help to prevent nausea. If possible, please try to time your meals with your medications. It is very important to stay hydrated following surgery. Sip fluids frequently while awake. Avoid acidic drinks such as citrus juices and soda for 24 hours. Carbonated beverages may cause bloating and gas. Acceptable fluids include:    - water (flavor packets may add variety)  - coffee or tea (in moderation)  - Gatorade  - Pierce-aid  - apple juice  - cranberry juice    You are encouraged to cough and deep breathe every hour when awake. This will help to prevent respiratory complications following anesthesia. You may want to hug a pillow when coughing and sneezing to add additional support to the surgical area and to decrease discomfort if you had abdominal or chest surgery. If you are discharged home with support stockings, you may remove them after 24 hours. Support stockings are used to help prevent blood clots in the legs following surgery. Please take time to review all of your Home Care Instructions and Medication Information sheets provided in your discharge packet. If you have any questions, please contact your surgeons office. Thank you. How to Care for Your Wound After Its Treated With  DERMABOND* Topical Skin Adhesive  DERMABOND* Topical Skin Adhesive (2-octyl cyanoacrylate) is a sterile, liquid skin adhesive  that holds wound edges together.  The film will usually remain in place for 5 to 10 days, then  naturally fall off your skin. The following will answer some of your questions and provide instructions for proper care for your  wound while it is healing:    CHECK WOUND APPEARANCE   Some swelling, redness, and pain are common with all wounds and normally will go away as the  wound heals. If swelling, redness, or pain increases or if the wound feels warm to the touch,  contact a doctor. Also contact a doctor if the wound edges reopen or separate. REPLACE BANDAGES   If your wound is bandaged, keep the bandage dry.  Replace the dressing daily until the adhesive film has fallen off or if the  bandage should become wet, unless otherwise instructed by your  physician.  When changing the dressing, do not place tape directly over the  DERMABOND adhesive film, because removing the tape later may also  remove the film. AVOID TOPICAL MEDICATIONS   Do not apply liquid or ointment medications or any other product to your wound while the  DERMABOND adhesive film is in place. These may loosen the film before your wound is healed. KEEP WOUND DRY AND PROTECTED   You may occasionally and briefly wet your wound in the shower or bath. Do not soak or scrub  your wound, do not swim, and avoid periods of heavy perspiration until the DERMABOND  adhesive has naturally fallen off. After showering or bathing, gently blot your wound dry with a  soft towel. If a protective dressing is being used, apply a fresh, dry bandage, being sure to keep  the tape off the DERMABOND adhesive film.  Apply a clean, dry bandage over the wound if necessary to protect it.  Protect your wound from injury until the skin has had sufficient time to heal.   Do not scratch, rub, or pick at the DERMABOND adhesive film. This may loosen the film before  your wound is healed.  Protect the wound from prolonged exposure to sunlight or tanning lamps while the film is in  place.   If you have any questions or concerns about this product, please consult your doctor. *Trademark ©[employer] 2002    Narcotic-Analgesic/Acetaminophen (By mouth)   Relieves pain. Brand Name(s): Capital w/Codeine, [place], [place], [place], Lorcet HD, Lorcet Plus, Lortab 10/325, Lortab 5/325, Lortab 7.5/325, Lortab Elixir, [place], [place], [place], Trezix, Tylenol With Codeine No. 4   There may be other brand names for this medicine. When This Medicine Should Not Be Used: You should not use this medicine if you have had an allergic reaction to acetaminophen, codeine, hydrocodone, propoxyphene, or sulfites. You should not use this medicine if you have had an allergic reaction to any other opioid pain medicine. How to Use This Medicine:   Liquid, Tablet, Capsule  · Your doctor will tell you how much medicine to use. Do not use more than directed. · This medicine contains acetaminophen. Read the labels of all other medicines you are using to see if they also contain acetaminophen, or ask your doctor or pharmacist. Nakul Burns not use more than 4 grams (4,000 milligrams) total of acetaminophen in one day. · Drink plenty of liquids to help avoid constipation. If a dose is missed:   · Some of these medicines need to be used on a fixed schedule. If you miss a dose or forget to use your medicine, call your doctor pharmacist for instructions. Do not use extra medicine to make up for a missed dose. How to Store and Dispose of This Medicine:   · Store the medicine in a closed container at room temperature, away from heat, moisture, and direct light. Do not refrigerate or freeze the medicine. · Ask your pharmacist, doctor, or health caregiver about the best way to dispose of any outdated medicine or medicine no longer needed. · Keep all medicine out of the reach of children. Never share your medicine with anyone.   Drugs and Foods to Avoid:   Ask your doctor or pharmacist before using any other medicine, including over-the-counter medicines, vitamins, and herbal products. · Make sure your doctor knows if you are using a monoamine oxidase inhibitor (MAOI) medicine, such as Eldepryl®, Marplan®, [place], or Parnate®. Make sure your doctor knows if you are also using a medicine to treat depression, such as amitriptyline, doxepin, nortriptyline, Elavil®, Pamelor®, or Sinequan®. Make sure your doctor knows if you are taking an anticholinergic medicine, such as atropine, methscopolamine, or scopolamine. · Tell your doctor if you use anything else that makes you sleepy. Some examples are allergy medicine, narcotic pain medicine, and alcohol. · Do not drink alcohol while you are using this medicine. Warnings While Using This Medicine:   · Make sure your doctor knows if you are pregnant or breast feeding, or if you have a head injury, or other conditions that may cause an increase in intercranial pressure (pressure inside your head). Make sure your doctor knows if you have severe kidney problems or severe liver problems, or if you have hypothyroidism (lack of thyroid function). Make sure your doctor knows if you have Manuel's disease (adrenal gland disease), or if you have enlarged prostate or urethral stricture. Make sure your doctor knows if you have any abdominal problems, or if you have lung disease or asthma. · This medicine may make you dizzy or drowsy. Avoid driving, using machines, or anything else that could be dangerous if you are not alert. · This medicine can be habit-forming. Do not use more than your prescribed dose. Call your doctor if you think your medicine is not working. · Tell any doctor or dentist who treats you that you are using this medicine. This medicine may affect certain medical test results. · This medicine may cause constipation, especially with long-term use. Ask your doctor if you should use a laxative to prevent and treat constipation.   · When a mother is breastfeeding and takes codeine, there is a very small chance that this medicine could cause serious side effects in the baby. This is because codeine works differently in a few women, so their breast milk contains too much medicine. If you take codeine, be alert for these signs of overdose in your nursing baby: sleeping more than usual, trouble breastfeeding, trouble breathing, or being limp and weak. Call the baby's doctor right away if you think there is a problem. If you cannot talk to the doctor, take the baby to the emergency room or call 911. Possible Side Effects While Using This Medicine:   Call your doctor right away if you notice any of these side effects:  · Allergic reaction: Itching or hives, swelling in your face or hands, swelling or tingling in your mouth or throat, chest tightness, trouble breathing  · Dizziness. · Seeing or hearing things that are not there. · Very slow heartbeat. If you notice these less serious side effects, talk with your doctor:   · Change in how much or how often you urinate. · Cold, clammy skin. · Feeling unusually anxious, excited, fearful, or tired. · Nausea, vomiting, constipation, stomach pain or upset, or heartburn. · Skin rash. · Vision changes. If you notice other side effects that you think are caused by this medicine, tell your doctor. Call your doctor for medical advice about side effects. You may report side effects to FDA at 9-746-FDA-8433  © 2017 [address] Information is for End User's use only and may not be sold, redistributed or otherwise used for commercial purposes. The above information is an  only. It is not intended as medical advice for individual conditions or treatments. Talk to your doctor, nurse or pharmacist before following any medical regimen to see if it is safe and effective for you.

## 2018-07-11 NOTE — IP AVS SNAPSHOT
[address] [facility] 
[phone] Patient: Lakisha Lopez MRN: [id number] JFN:[id number] About your hospitalization You were admitted on:  July 11, 2018 You last received care in the:  [facility] PACU You were discharged on:  July 11, 2018 Why you were hospitalized Your primary diagnosis was:  Not on File Your diagnoses also included:  Gallstones Follow-up Information Follow up With Details Comments Contact [address] [place] [address] 
[phone] Your Scheduled Appointments Tuesday July 24, 2018  3:00 PM EDT  
POST OP with Barbie De La Fuente MD  
[facility] Dr. Ayaan [facility] ([address]) [address] [place] [address], [address] [place] [address]  
[phone] Discharge Orders None A check sharon indicates which time of day the medication should be taken. My Medications START taking these medications Instructions Each Dose to Equal  
 Morning Noon Evening Bedtime HYDROcodone-acetaminophen 5-325 mg per tablet Commonly known as:  Juanetta Rasp Your last dose was: Your next dose is: Take 1-2 Tabs by mouth every six (6) hours as needed for Pain. Max Daily Amount: 8 Tabs. 1-2 Tab  
    
   
   
   
  
 ibuprofen 800 mg tablet Commonly known as:  MOTRIN Your last dose was: Your next dose is: Take 1 Tab by mouth three (3) times daily (with meals). May use over-the-counter equivalent instead. 800 mg  
    
   
   
   
  
 polyethylene glycol 17 gram/dose powder Commonly known as:  Duane Pollock Your last dose was: Your next dose is: Take 17 g by mouth daily. 17 g Where to Get Your Medications These medications were sent to [id number] [facility] [address]  [address] [facility], [address] [place] [address] Hours:  24-hours Phone:  [phone]  
  ibuprofen 800 mg tablet  
 polyethylene glycol 17 gram/dose powder Information on where to get these meds will be given to you by the nurse or doctor. ! Ask your nurse or doctor about these medications HYDROcodone-acetaminophen 5-325 mg per tablet Opioid Education Prescription Opioids: What You Need to Know: 
 
Prescription opioids can be used to help relieve moderate-to-severe pain and are often prescribed following a surgery or injury, or for certain health conditions. These medications can be an important part of treatment but also come with serious risks. Opioids are strong pain medicines. Examples include hydrocodone, oxycodone, fentanyl, and morphine. Heroin is an example of an illegal opioid. It is important to work with your health care provider to make sure you are getting the safest, most effective care. WHAT ARE THE RISKS AND SIDE EFFECTS OF OPIOID USE? Prescription opioids carry serious risks of addiction and overdose, especially with prolonged use. An opioid overdose, often marked by slow breathing, can cause sudden death. The use of prescription opioids can have a number of side effects as well, even when taken as directed. · Tolerance-meaning you might need to take more of a medication for the same pain relief · Physical dependence-meaning you have symptoms of withdrawal when the medication is stopped. Withdrawal symptoms can include nausea, sweating, chills, diarrhea, stomach cramps, and muscle aches. Withdrawal can last up to several weeks, depending on which drug you took and how long you took it. · Increased sensitivity to pain · Constipation · Nausea, vomiting, and dry mouth · Sleepiness and dizziness · Confusion · Depression · Low levels of testosterone that can result in lower sex drive, energy, and strength · Itching and sweating RISKS ARE GREATER WITH:      
· History of drug misuse, substance use disorder, or overdose · Mental health conditions (such as depression or anxiety) · Sleep apnea · Older age (72 years or older) · Pregnancy Avoid alcohol while taking prescription opioids. Also, unless specifically advised by your health care provider, medications to avoid include: · Benzodiazepines (such as Xanax or Valium) · Muscle relaxants (such as Soma or Flexeril) · Hypnotics (such as Ambien or Lunesta) · Other prescription opioids KNOW YOUR OPTIONS Talk to your health care provider about ways to manage your pain that don't involve prescription opioids. Some of these options may actually work better and have fewer risks and side effects. Options may include: 
· Pain relievers such as acetaminophen, ibuprofen, and naproxen · Some medications that are also used for depression or seizures · Physical therapy and exercise · Counseling to help patients learn how to cope better with triggers of pain and stress. · Application of heat or cold compress · Massage therapy · Relaxation techniques Be Informed Make sure you know the name of your medication, how much and how often to take it, and its potential risks & side effects. IF YOU ARE PRESCRIBED OPIOIDS FOR PAIN: 
· Never take opioids in greater amounts or more often than prescribed. Remember the goal is not to be pain-free but to manage your pain at a tolerable level. · Follow up with your primary care provider to: · Work together to create a plan on how to manage your pain. · Talk about ways to help manage your pain that don't involve prescription opioids. · Talk about any and all concerns and side effects. · Help prevent misuse and abuse. · Never sell or share prescription opioids · Help prevent misuse and abuse.  
· Store prescription opioids in a secure place and out of reach of others (this may include visitors, children, friends, and family). · Safely dispose of unused/unwanted prescription opioids: Find your community drug take-back program or your pharmacy mail-back program, or flush them down the toilet, following guidance from the Food and Drug Administration (www.fda.gov/Drugs/ResourcesForYou). · Visit www.cdc.gov/drugoverdose to learn about the risks of opioid abuse and overdose. · If you believe you may be struggling with addiction, tell your health care provider and ask for guidance or call [facility] [employer] at 9-271-455-YXBW. Discharge Instructions Discharge Instructions:  Laparoscopic Cholecystectomy (Gallbladder Removal)  Dr. Natasha Simpson Call on next business day to arrange appointment for follow up in 3 weeks -- [phone]. Activity: 
Walk regularly - can walk today as tolerated, but make yourself walk at least 50 yards at least 6 times per day starting tomorrow. No lifting more than 10 -15 pounds for 4 weeks. You may resume driving in 5-7 days unless still requiring narcotics for pain. Work: 
You may return to work in 1 or 2 weeks to light activity. No lifting more than 10 pounds (=\"light duty\") for four weeks. If your employer can not accommodate \"light duty,\" your employer will need to provide any necessary paperwork to our office. This document with serve as the initial \"note\" to your employer. Diet: 
You may resume normal diet after 24 hours. Fatty foods may still cause some stomach upset. Avoid fatty/greasy foods for 1 month, then add back slowly. Wound Care: 
Dermabond glue dressing will fall off over the next couple of weeks. It is waterproof. You may shower, but no swimming or baths for 2 weeks. Your incisions may ooze for a few days. Do not worry about this.   If you experience a lot of drainage, develop redness around the wound, or a fever over 80 F occurs please call the office. Medications: 
See attached \"Medication Reconciliation. \" 
 
Resume home medications as indicated on the Medical Reconciliation form. Do not use blood thinners (such as Aspirin, Coumadin, or Plavix) until 3 days after surgery. Pain medications:  Non steroidal antiinflammatories (ibuprofen -- Advil or Motrin) seem to work best for post surgical pain. Try these first.  A narcotic prescription will also be given for breakthrough pain. Do not use Tylenol while taking the narcotic because there is Tylenol in the narcotic pill, and too much Tylenol can injure your liver. PUT ICE ON YOUR INCISIONS 20 MINUTES EVERY HOUR WHILE THEY REMAIN SORE. PLACE A CLOTH BETWEEN YOUR SKIN THE THE ICE BAG. Colace or Miralax should be used twice daily to prevent constipation while on narcotics. If you are still having trouble having a BM after 1-2 days, try milk of magnesia. If this does not work within 24 hours, try a bottle of magnesium citrate. Narcotics and anesthesia sometimes cause nausea and vomiting. If persistent please call the office. Do not hesitate to call with questions or concerns. Sol Oliveira MD 
[facility]. Tel. [phone] Fax [phone] DISCHARGE SUMMARY from Nurse PATIENT INSTRUCTIONS: 
 
After general anesthesia or intravenous sedation, for 24 hours or while taking prescription Narcotics: · Limit your activities · Do not drive and operate hazardous machinery · Do not make important personal or business decisions · Do  not drink alcoholic beverages · If you have not urinated within 8 hours after discharge, please contact your surgeon on call. Report the following to your surgeon: 
· Excessive pain, swelling, redness or odor of or around the surgical area · Temperature over 100.5 · Nausea and vomiting lasting longer than 4 hours or if unable to take medications · Any signs of decreased circulation or nerve impairment to extremity: change in color, persistent  numbness, tingling, coldness or increase pain · Any questions What to do at Home: *  Please give a list of your current medications to your Primary Care Provider. *  Please update this list whenever your medications are discontinued, doses are 
    changed, or new medications (including over-the-counter products) are added. *  Please carry medication information at all times in case of emergency situations. These are general instructions for a healthy lifestyle: No smoking/ No tobacco products/ Avoid exposure to second hand smoke Surgeon General's Warning:  Quitting smoking now greatly reduces serious risk to your health. Obesity, smoking, and sedentary lifestyle greatly increases your risk for illness A healthy diet, regular physical exercise & weight monitoring are important for maintaining a healthy lifestyle You may be retaining fluid if you have a history of heart failure or if you experience any of the following symptoms:  Weight gain of 3 pounds or more overnight or 5 pounds in a week, increased swelling in our hands or feet or shortness of breath while lying flat in bed. Please call your doctor as soon as you notice any of these symptoms; do not wait until your next office visit. Recognize signs and symptoms of STROKE: 
 
 
? soup 
? broth 
?  toast  
? crackers ? applesauce 
? bananas  
? mashed potatoes, 
? soft or scrambled eggs 
? oatmeal 
?  jello It is important to eat when taking your pain medication. This will help to prevent nausea. If possible, please try to time your meals with your medications. It is very important to stay hydrated following surgery. Sip fluids frequently while awake. Avoid acidic drinks such as citrus juices and soda for 24 hours. Carbonated beverages may cause bloating and gas. Acceptable fluids include: 
 
? water (flavor packets may add variety) ? coffee or tea (in moderation) ? Gatorade ? Antoinette Esters ? apple juice 
? cranberry juice You are encouraged to cough and deep breathe every hour when awake. This will help to prevent respiratory complications following anesthesia. You may want to hug a pillow when coughing and sneezing to add additional support to the surgical area and to decrease discomfort if you had abdominal or chest surgery. If you are discharged home with support stockings, you may remove them after 24 hours. Support stockings are used to help prevent blood clots in the legs following surgery. Please take time to review all of your Home Care Instructions and Medication Information sheets provided in your discharge packet. If you have any questions, please contact your surgeons office. Thank you. A common side effect of anesthesia following surgery is nausea and/or vomiting. In order to decrease symptoms, it is wise to avoid foods that are high in fat, greasy foods, milk products, and spicy foods for the first 24 hours. Acceptable foods for the first 24 hours following surgery include but are not limited to: 
 
? soup 
? broth 
?  toast  
? crackers ? applesauce 
? bananas  
? mashed potatoes, 
? soft or scrambled eggs 
? oatmeal 
?  jello It is important to eat when taking your pain medication. This will help to prevent nausea. If possible, please try to time your meals with your medications. It is very important to stay hydrated following surgery. Sip fluids frequently while awake. Avoid acidic drinks such as citrus juices and soda for 24 hours. Carbonated beverages may cause bloating and gas. Acceptable fluids include: 
 
? water (flavor packets may add variety) ? coffee or tea (in moderation) ? Gatorade ? Antoinette Esters ? apple juice 
? cranberry juice You are encouraged to cough and deep breathe every hour when awake. This will help to prevent respiratory complications following anesthesia.  You may want to hug a pillow when coughing and sneezing to add additional support to the surgical area and to decrease discomfort if you had abdominal or chest surgery. If you are discharged home with support stockings, you may remove them after 24 hours. Support stockings are used to help prevent blood clots in the legs following surgery. Please take time to review all of your Home Care Instructions and Medication Information sheets provided in your discharge packet. If you have any questions, please contact your surgeons office. Thank you. How to Care for Your Wound After Its Treated With DERMABOND* Topical Skin Adhesive DERMABOND* Topical Skin Adhesive (2-octyl cyanoacrylate) is a sterile, liquid skin adhesive 
that holds wound edges together. The film will usually remain in place for 5 to 10 days, then 
naturally fall off your skin. The following will answer some of your questions and provide instructions for proper care for your 
wound while it is healing: CHECK WOUND APPEARANCE 
 Some swelling, redness, and pain are common with all wounds and normally will go away as the 
wound heals. If swelling, redness, or pain increases or if the wound feels warm to the touch, 
contact a doctor. Also contact a doctor if the wound edges reopen or separate. REPLACE BANDAGES 
 If your wound is bandaged, keep the bandage dry.  Replace the dressing daily until the adhesive film has fallen off or if the 
bandage should become wet, unless otherwise instructed by your 
physician.  When changing the dressing, do not place tape directly over the DERMABOND adhesive film, because removing the tape later may also 
remove the film. AVOID TOPICAL MEDICATIONS  Do not apply liquid or ointment medications or any other product to your wound while the DERMABOND adhesive film is in place. These may loosen the film before your wound is healed. KEEP WOUND DRY AND PROTECTED 
  You may occasionally and briefly wet your wound in the shower or bath. Do not soak or scrub 
your wound, do not swim, and avoid periods of heavy perspiration until the DERMABOND 
adhesive has naturally fallen off. After showering or bathing, gently blot your wound dry with a 
soft towel. If a protective dressing is being used, apply a fresh, dry bandage, being sure to keep 
the tape off the DERMABOND adhesive film.  Apply a clean, dry bandage over the wound if necessary to protect it.  Protect your wound from injury until the skin has had sufficient time to heal. 
 Do not scratch, rub, or pick at the DERMABOND adhesive film. This may loosen the film before 
your wound is healed.  Protect the wound from prolonged exposure to sunlight or tanning lamps while the film is in 
place. If you have any questions or concerns about this product, please consult your doctor. *Trademark ©[employer] 2002 Narcotic-Analgesic/Acetaminophen (By mouth) Relieves pain. Brand Name(s): Capital w/Codeine, [place], Echt, [place], Lorcet HD, Lorcet Plus, Lortab 10/325, Lortab 5/325, Lortab 7.5/325, Lortab Elixir, [place], [place], [place], Trezix, Tylenol With Codeine No. 4 There may be other brand names for this medicine. When This Medicine Should Not Be Used: You should not use this medicine if you have had an allergic reaction to acetaminophen, codeine, hydrocodone, propoxyphene, or sulfites. You should not use this medicine if you have had an allergic reaction to any other opioid pain medicine. How to Use This Medicine:  
Liquid, Tablet, Capsule · Your doctor will tell you how much medicine to use. Do not use more than directed. · This medicine contains acetaminophen. Read the labels of all other medicines you are using to see if they also contain acetaminophen, or ask your doctor or pharmacist. [employer] not use more than 4 grams (4,000 milligrams) total of acetaminophen in one day. · Drink plenty of liquids to help avoid constipation. If a dose is missed: · Some of these medicines need to be used on a fixed schedule. If you miss a dose or forget to use your medicine, call your doctor pharmacist for instructions. Do not use extra medicine to make up for a missed dose. How to Store and Dispose of This Medicine: · Store the medicine in a closed container at room temperature, away from heat, moisture, and direct light. Do not refrigerate or freeze the medicine. · Ask your pharmacist, doctor, or health caregiver about the best way to dispose of any outdated medicine or medicine no longer needed. · Keep all medicine out of the reach of children. Never share your medicine with anyone. Drugs and Foods to Avoid: Ask your doctor or pharmacist before using any other medicine, including over-the-counter medicines, vitamins, and herbal products. · Make sure your doctor knows if you are using a monoamine oxidase inhibitor (MAOI) medicine, such as Eldepryl®, Marplan®, [place], or Parnate®. Make sure your doctor knows if you are also using a medicine to treat depression, such as amitriptyline, doxepin, nortriptyline, Elavil®, Pamelor®, or Sinequan®. Make sure your doctor knows if you are taking an anticholinergic medicine, such as atropine, methscopolamine, or scopolamine. · Tell your doctor if you use anything else that makes you sleepy. Some examples are allergy medicine, narcotic pain medicine, and alcohol. · Do not drink alcohol while you are using this medicine. Warnings While Using This Medicine: · Make sure your doctor knows if you are pregnant or breast feeding, or if you have a head injury, or other conditions that may cause an increase in intercranial pressure (pressure inside your head). Make sure your doctor knows if you have severe kidney problems or severe liver problems, or if you have hypothyroidism (lack of thyroid function).  Make sure your doctor knows if you have Manuel's disease (adrenal gland disease), or if you have enlarged prostate or urethral stricture. Make sure your doctor knows if you have any abdominal problems, or if you have lung disease or asthma. · This medicine may make you dizzy or drowsy. Avoid driving, using machines, or anything else that could be dangerous if you are not alert. · This medicine can be habit-forming. Do not use more than your prescribed dose. Call your doctor if you think your medicine is not working. · Tell any doctor or dentist who treats you that you are using this medicine. This medicine may affect certain medical test results. · This medicine may cause constipation, especially with long-term use. Ask your doctor if you should use a laxative to prevent and treat constipation. · When a mother is breastfeeding and takes codeine, there is a very small chance that this medicine could cause serious side effects in the baby. This is because codeine works differently in a few women, so their breast milk contains too much medicine. If you take codeine, be alert for these signs of overdose in your nursing baby: sleeping more than usual, trouble breastfeeding, trouble breathing, or being limp and weak. Call the baby's doctor right away if you think there is a problem. If you cannot talk to the doctor, take the baby to the emergency room or call 911. Possible Side Effects While Using This Medicine:  
Call your doctor right away if you notice any of these side effects: · Allergic reaction: Itching or hives, swelling in your face or hands, swelling or tingling in your mouth or throat, chest tightness, trouble breathing · Dizziness. · Seeing or hearing things that are not there. · Very slow heartbeat. If you notice these less serious side effects, talk with your doctor: · Change in how much or how often you urinate. · Cold, clammy skin. · Feeling unusually anxious, excited, fearful, or tired. · Nausea, vomiting, constipation, stomach pain or upset, or heartburn. · Skin rash. · Vision changes. If you notice other side effects that you think are caused by this medicine, tell your doctor. Call your doctor for medical advice about side effects. You may report side effects to FDA at 8-169-AQV-8744 © 2017 [address] Information is for End User's use only and may not be sold, redistributed or otherwise used for commercial purposes. The above information is an  only. It is not intended as medical advice for individual conditions or treatments. Talk to your doctor, nurse or pharmacist before following any medical regimen to see if it is safe and effective for you. Introducing [facility]! Dear Ramon PERSAUD: 
Thank you for requesting a [employer] account. Our records indicate that you already have an active [employer] account. You can access your account anytime at [email or web address] Did you know that you can access your hospital and ER discharge instructions at any time in [employer]? You can also review all of your test results from your hospital stay or ER visit. Additional Information If you have questions, please visit the Frequently Asked Questions section of the [employer] website at [email or web address] Remember, [employer] is NOT to be used for urgent needs. For medical emergencies, dial 911. Now available from your iPhone and Android! Introducing Jose Mathew As a [facility] patient, I wanted to make you aware of our electronic visit tool called Jose Mathew. [facility] 24/7 allows you to connect within minutes with a medical provider 24 hours a day, seven days a week via a mobile device or tablet or logging into a secure website from your computer. You can access Jose Mathew from anywhere in the [place]. A virtual visit might be right for you when you have a simple condition and feel like you just dont want to get out of bed, or cant get away from work for an appointment, when your regular [facility] provider is not available (evenings, weekends or holidays), or when youre out of town and need minor care. Electronic visits cost only $49 and if the [facility] 24/7 provider determines a prescription is needed to treat your condition, one can be electronically transmitted to a nearby pharmacy*. Please take a moment to enroll today if you have not already done so. The enrollment process is free and takes just a few minutes. To enroll, please download the PushPage 24/ChatID jose luis to your tablet or phone, or visit [email or web address] to enroll on your computer. And, as an [address] [place] [address] patient with a [employer] account, the results of your visits will be scanned into your electronic medical record and your primary care provider will be able to view the scanned results. We urge you to continue to see your regular [facility] provider for your ongoing medical care. And while your primary care provider may not be the one available when you seek a Jose Mathew virtual visit, the peace of mind you get from getting a real diagnosis real time can be priceless. For more information on Jose Mathew, view our Frequently Asked Questions (FAQs) at [email or web address] org. Sincerely, 
 
Maynor Chery MD 
Chief Medical Officer Catherine Farias *:  certain medications cannot be prescribed via [facility]w Providers Seen During Your Hospitalization Provider Specialty Primary office phone Stella Vo MD General Surgery [phone] Your Primary Care Physician (PCP) Primary Care Physician Office Phone Office Fax Evin Rayo [phone] You are allergic to the following No active allergies Recent Documentation Height Weight BMI OB Status Smoking Status 1.664 m 104.3 kg 37.68 kg/m2 Polycystic Ovarian Syndrome Former Smoker Emergency Contacts Name Discharge Info Relation Home Work Mobile Vania Greer N/A  AT THIS TIME [6] Spouse [3] [phone] Brooke  Parent [1] [phone] Patient Belongings The following personal items are in your possession at time of discharge: 
  Dental Appliances: None  Visual Aid: Glasses, With patient   Hearing Aids/Status: Does not own  Home Medications: None   Jewelry: None  Clothing: Undergarments, With patient, Footwear, Pants, Shirt    Other Valuables: Eyeglasses, Cell Phone  Personal Items Sent to Safe: no valuables to send to security Please provide this summary of care documentation to your next provider. Signatures-by signing, you are acknowledging that this After Visit Summary has been reviewed with you and you have received a copy. Patient Signature:  ____________________________________________________________ Date:  ____________________________________________________________  
  
[employer] Provider Signature:  ____________________________________________________________ Date:  ____________________________________________________________

## 2018-07-11 NOTE — PERIOP NOTES
For dc home. Vswnl. Reports pain as 3/10 which is tolerable per pt. Denies nausea. dsgs to abd d&i. Went over [employer] instructions w/pt and wife including Rx and f/up. Verbalized understanding. dc'd home.

## 2018-07-11 NOTE — PERIOP NOTES
11:22= labs drawn from new IV site in right wrist and walked to lab for eval.    11:45= wanted to know if mother could come back to see prior to OR; had not seen before surgery. Mother brought in to see pt.

## 2018-07-11 NOTE — PERIOP NOTES
Handoff Report from Operating Room to PACU    Report received from Naun Allen RN and Grayson Sher CRNA regarding [employer]. Surgeon(s):  Amrita Salvador MD  And Procedure(s) (LRB):  LAPAROSCOPIC CHOLECYSTECTOMY WITH GRAMS (N/A)  confirmed   with dressings discussed. Anesthesia type, drugs, patient history, complications, estimated blood loss, vital signs, intake and output, and last pain medication, lines, reversal medications and temperature were reviewed.

## 2018-07-11 NOTE — ROUTINE PROCESS
Patient: Joni Johnson MRN: [id number]  SSN: [id number]   YOB: 1980  Age: 30 y.o. Sex: female     Patient is status post Procedure(s):  LAPAROSCOPIC CHOLECYSTECTOMY WITH GRAMS. Surgeon(s) and Role:     * Vitaliy Hammer MD - Primary    Local/Dose/Irrigation:  See [facility]                  Peripheral IV 07/11/18 Right Wrist (Active)   Site Assessment Clean, dry, & intact 7/11/2018 11:19 AM   Phlebitis Assessment 0 7/11/2018 11:19 AM   Infiltration Assessment 0 7/11/2018 11:19 AM   Dressing Status Occlusive;New 7/11/2018 11:19 AM   Dressing Type Tape;Transparent 7/11/2018 11:19 AM   Hub Color/Line Status Pink; Infusing 7/11/2018 11:19 AM   Action Taken Blood drawn 7/11/2018 11:19 AM            Airway - Endotracheal Tube 07/11/18 Oral (Active)                   Dressing/Packing:  Wound Abdomen-DRESSING TYPE: Topical skin adhesive/glue (07/11/18 1402)  Splint/Cast:  ]

## 2018-07-11 NOTE — ANESTHESIA PREPROCEDURE EVALUATION
Anesthetic History   No history of anesthetic complications            Review of Systems / Medical History  Patient summary reviewed, nursing notes reviewed and pertinent labs reviewed    Pulmonary  Within defined limits              Comments:  Former Smoker - 2.5 pack years   Neuro/Psych   Within defined limits           Cardiovascular  Within defined limits                Exercise tolerance: >4 METS     GI/Hepatic/Renal     GERD           Endo/Other        Obesity     Other Findings   Comments: Chlamydia  Genital herpes  Polycystic disease, ovaries         Physical Exam    Airway  Mallampati: II  TM Distance: > 6 cm  Neck ROM: normal range of motion   Mouth opening: Normal     Cardiovascular  Regular rate and rhythm,  S1 and S2 normal,  no murmur, click, rub, or gallop             Dental  No notable dental hx       Pulmonary  Breath sounds clear to auscultation               Abdominal  GI exam deferred       Other Findings            Anesthetic Plan    ASA: 2  Anesthesia type: general          Induction: Intravenous  Anesthetic plan and risks discussed with: Patient

## 2018-07-11 NOTE — ANESTHESIA POSTPROCEDURE EVALUATION
Post-Anesthesia Evaluation and Assessment    Patient: Cabrera Reagan MRN: [id number]  SSN: [id number]    YOB: 1980  Age: 30 y.o. Sex: female       Cardiovascular Function/Vital Signs  Visit Vitals    /64    Pulse 70    Temp 36.4 °C (97.6 °F)    Resp 22    Ht 5' 5.5\" (1.664 m)    Wt 104.3 kg (229 lb 15 oz)    SpO2 100%    BMI 37.68 kg/m2       Patient is status post general anesthesia for Procedure(s):  LAPAROSCOPIC CHOLECYSTECTOMY WITH GRAMS. Nausea/Vomiting: None    Postoperative hydration reviewed and adequate. Pain:  Pain Scale 1: Numeric (0 - 10) (07/11/18 1515)  Pain Intensity 1: 3 (07/11/18 1515)   Managed    Neurological Status:   Neuro (WDL): Exceptions to WDL (07/11/18 1421)  Neuro  Neurologic State: Drowsy; Eyes open to voice (07/11/18 1421)  Orientation Level: Oriented to person;Oriented to place;Oriented to situation (07/11/18 1421)  Cognition: Follows commands (07/11/18 1421)  Speech: Clear (07/11/18 1421)  LUE Motor Response: Purposeful (07/11/18 1421)  LLE Motor Response: Purposeful (07/11/18 1421)  RUE Motor Response: Purposeful (07/11/18 1421)  RLE Motor Response: Purposeful (07/11/18 1421)   At baseline    Mental Status and Level of Consciousness: Arousable    Pulmonary Status:   O2 Device: Room air (07/11/18 1430)   Adequate oxygenation and airway patent    Complications related to anesthesia: None    Post-anesthesia assessment completed.  No concerns    Signed By: Nan Donaldson MD     July 11, 2018

## 2018-07-11 NOTE — INTERVAL H&P NOTE
H&P Update:  Frederick Joseph was seen and examined. History and physical has been reviewed. The patient has been examined.  There have been no significant clinical changes since the completion of the originally dated History and Physical.    Signed By: Ruba Cook MD     July 11, 2018 12:32 PM

## 2018-07-11 NOTE — OP NOTES
DATE OF PROCEDURE:  7/11/2018     PREOPERATIVE DIAGNOSIS:   Symptomatic gallstones    POSTOPERATIVE DIAGNOSIS:   GALLSTONES     OPERATIVE PROCEDURE:  Laparoscopic cholecystectomy with cholangiogram, interpretation of cholangiogram (CPT 29671 17204)    SURGEON: Marylee Fraction, MD    ANESTHESIA:  General endotracheal.    EBL: minimal    SPECIMENS:   ID Type Source Tests Collected by Time Destination   1 : gallbladder Preservative Gallbladder  Rbua Cook MD 7/11/2018 1346 Pathology        FINDINGS:  Mild inflammation of the gallbladder, gallstones. No CBD filling defect on cholangiogram.    INDICATIONS:  RUQ and epigastric pain. Gallstones on ultrasound. DESCRIPTION OF PROCEDURE:  After obtaining informed consent, the patient was taken to the operating room and placed supine on the operating table. An operative time-out was performed, and general endotracheal anesthesia was induced.  Preoperative antibiotics were administered, and the abdomen was prepped and draped in the usual sterile fashion.  The abdomen was then entered just above the umbilicus using a 5-mm optical trocar.  The abdomen was the insufflated without incident and was visually explored. María Elena Pica was no other pathology noted.  Two right upper quadrant 5-mm ports were placed under direct vision, followed by a 12-mm port in the subxiphoid position.  30 mL of Marcaine 0.5% with epinephrine was infiltrated at the port sites prior to placement. The gallbladder fundus was then grasped and retracted cephalad over the liver.  The triangle of Calot was exposed, and the cystic duct and artery were skeletonized using a combination of blunt dissection with a [place] dissector and hook electrocautery.  The cystic artery was then divided between clips with 2 clips left on the patient side.   The cystic duct was then traced from the gallbladder infundibulum into its insertion into the portal triad.  The cystic duct was swept with a grasper up into the gallbladder. A clip was placed on the gallbladder infundibulum and an incision in the cystic duct adjacent to the gallbladder infundibulum was made with michelle and the cholangiogram catheter was inserted and the cholangiogram was per performed with the above noted findings. The cystic duct was then divided between clips directly adjacent to the gallbladder infundibulum, with 3 clips left on the patient's side. The gallbladder was then removed from the liver bed using hook electrocautery.  It was removed from the abdominal cavity using a bag through the subxiphoid incision.  The liver bed was inspected for hemostasis, and excellent hemostasis was achieved with minimal electrocautery.  The clips on the cystic duct and artery were again visualized and were intact.  The area below and lateral to the liver was suction irrigated until clear. The right upper quadrant ports were removed under direct vision and both were hemostatic.  The abdomen was then desufflated through the subxiphoid port under direct vision via the umbilical port.  These ports were subsequently removed and the fascial defect at the 12-mm incision was closed with an interrupted 0 Vicryl suture.  The incisions were irrigated with sterile saline and made hemostatic with minimal electrocautery. Lonzie Lipschutz were closed with buried interrupted 4-0 Monocryl sutures, and dressed with sterile dressing.  The patient was recovered from general anesthesia and taken to the recovery area in satisfactory condition.   All instrument, sponge, and needle counts were reported as correct.     Coral Bahena MD

## 2018-08-02 ENCOUNTER — OFFICE VISIT (OUTPATIENT)
Dept: SURGERY | Age: 38
End: 2018-08-02

## 2018-08-02 VITALS
SYSTOLIC BLOOD PRESSURE: 120 MMHG | BODY MASS INDEX: 37 KG/M2 | HEART RATE: 109 BPM | DIASTOLIC BLOOD PRESSURE: 77 MMHG | OXYGEN SATURATION: 95 % | HEIGHT: 66 IN | WEIGHT: 230.2 LBS | TEMPERATURE: 98.1 F

## 2018-08-02 DIAGNOSIS — Z09 POSTOPERATIVE EXAMINATION: Primary | ICD-10-CM

## 2018-08-02 NOTE — PROGRESS NOTES
To: Blair Bishop MD 
CC: Robyn Gallegos MD 
From: Richardson Calderon MD 
 
Thank you for referring [facility]. Encounter Date: 8/2/2018 Subjective:  
  
[facility] GEMMA ALBRIGHT is a [address] y.o. female presents for postop care. Has no complaints today except for pain with lifting. Preoperative symptoms are resolved. Eating a regular diet without difficulty. Bowel movements are regular. Objective:  
 
General:  alert, cooperative, no distress, appears stated age Abdomen: soft, bowel sounds active, non-tender Incision(s):  healing well, no drainage, no erythema, no hernia, no seroma, no swelling, no dehiscence, incision well approximated. Assessment: S/p laparoscopic cholecystectomy. Pain with lifting. Doing well postoperatively. Plan: Will keep her out until 8/13/18 due to lifting needs at work. Told her to increase her activity before then to [facility] out the kinks\". No need for further f/u unless she has concerns -- knows to call if needed.    
 
Richardson Calderon MD

## 2018-08-02 NOTE — PROGRESS NOTES
1. Have you been to the ER, urgent care clinic since your last visit? Hospitalized since your last visit? No 
 
2. Have you seen or consulted any other health care providers outside of the [address] [place] [address] since your last visit? Include any pap smears or colon screening. No  
 
Chief Complaint Patient presents with  Surgical Follow-up Laparoscopic cholecystectomy with cholangiogram, interpretation of cholangiogram 7/11/18 Discussed advanced directive. Patient states that she does not have an advanced directive.

## 2018-08-02 NOTE — MR AVS SNAPSHOT
[address], [address] 
[phone] Patient: Papa Whitley MRN: [id number] [id number] Visit Information Date & Time Provider Department Dept. Phone Encounter #  
 8/2/2018 11:00 AM Homero Jo MD [facility] [id number] Upcoming Health Maintenance Date Due  
 PAP AKA CERVICAL CYTOLOGY 12/10/2003 DTaP/Tdap/Td series (1 - Tdap) 9/11/2007 Influenza Age 5 to Adult 8/1/2018 Allergies as of 8/2/2018  Review Complete On: 8/2/2018 By: Homero Jo MD  
 No Known Allergies Current Immunizations  Reviewed on 9/5/2014 Name Date Influenza Vaccine Split 9/10/2012 TD Vaccine 9/10/2007 Not reviewed this visit You Were Diagnosed With   
  
 Codes Comments Postoperative examination    -  Primary ICD-10-CM: Y64 ICD-9-CM: V67.00 Vitals BP Pulse Temp Height(growth percentile) Weight(growth percentile) SpO2  
 120/77 (BP 1 Location: Right arm, BP Patient Position: Sitting) (!) 109 98.1 °F (36.7 °C) 5' 5.5\" (1.664 m) 230 lb 3.2 oz (104.4 kg) 95% BMI OB Status Smoking Status 37.72 kg/m2 Polycystic Ovarian Syndrome Former Smoker Vitals History BMI and BSA Data Body Mass Index Body Surface Area  
 37.72 kg/m 2 2.2 m 2 Preferred Pharmacy Pharmacy Name Phone [id number]- [place], [place] - [address] [phone] Your Updated Medication List  
  
   
This list is accurate as of 8/2/18 11:42 AM.  Always use your most recent med list.  
  
  
  
  
 HYDROcodone-acetaminophen 5-325 mg per tablet Commonly known as:  Skip Handler Take 1-2 Tabs by mouth every six (6) hours as needed for Pain. Max Daily Amount: 8 Tabs. ibuprofen 800 mg tablet Commonly known as:  MOTRIN  
 Take 1 Tab by mouth three (3) times daily (with meals). May use over-the-counter equivalent instead. polyethylene glycol 17 gram/dose powder Commonly known as:  Lenirav Sans Take 17 g by mouth daily. Introducing [facility]! Dear Beti Paz N: 
Thank you for requesting a [employer] account. Our records indicate that you already have an active Midokura account. You can access your account anytime at [email or web address] Did you know that you can access your hospital and ER discharge instructions at any time in Midokura? You can also review all of your test results from your hospital stay or ER visit. Additional Information If you have questions, please visit the Frequently Asked Questions section of the Midokura website at [email or web address] Remember, Midokura is NOT to be used for urgent needs. For medical emergencies, dial 911. Now available from your iPhone and Android! Please provide this summary of care documentation to your next provider. Your primary care clinician is listed as Adonay Ross. If you have any questions after today's visit, please call [phone].

## 2018-08-09 ENCOUNTER — TELEPHONE (OUTPATIENT)
Dept: SURGERY | Age: 38
End: 2018-08-09

## 2018-08-09 NOTE — TELEPHONE ENCOUNTER
Returned call to patient. Faxed completed FMLA forms to patient, for confirmation. Patient is agreeable.

## 2018-08-09 NOTE — TELEPHONE ENCOUNTER
Patient states additional information is needed for her [facility] paperwork. It is due today. Please call.

## 2019-04-29 ENCOUNTER — HOSPITAL ENCOUNTER (EMERGENCY)
Age: 39
Discharge: HOME | End: 2019-04-29
Attending: EMERGENCY MEDICINE
Payer: COMMERCIAL

## 2019-04-29 ENCOUNTER — APPOINTMENT (OUTPATIENT)
Dept: VASCULAR SURGERY | Age: 39
End: 2019-04-29
Attending: PHYSICIAN ASSISTANT
Payer: COMMERCIAL

## 2019-04-29 VITALS
SYSTOLIC BLOOD PRESSURE: 134 MMHG | OXYGEN SATURATION: 99 % | TEMPERATURE: 98.3 F | HEART RATE: 78 BPM | DIASTOLIC BLOOD PRESSURE: 89 MMHG | RESPIRATION RATE: 16 BRPM

## 2019-04-29 DIAGNOSIS — R06.02 SOB (SHORTNESS OF BREATH): ICD-10-CM

## 2019-04-29 DIAGNOSIS — M79.662 PAIN OF LEFT CALF: ICD-10-CM

## 2019-04-29 DIAGNOSIS — R00.2 PALPITATIONS: Primary | ICD-10-CM

## 2019-04-29 LAB
ANION GAP BLD CALC-SCNC: 16 MMOL/L (ref 10–20)
ATRIAL RATE: 81 BPM
BUN BLD-MCNC: 14 MG/DL (ref 9–20)
CA-I BLD-MCNC: 1.2 MMOL/L (ref 1.12–1.32)
CALCULATED P AXIS, ECG09: 26 DEGREES
CALCULATED R AXIS, ECG10: 14 DEGREES
CHLORIDE BLD-SCNC: 105 MMOL/L (ref 98–107)
CO2 BLD-SCNC: 26 MMOL/L (ref 21–32)
COMMENT, HOLDF: NORMAL
CREAT BLD-MCNC: 0.9 MG/DL (ref 0.6–1.3)
D DIMER PPP FEU-MCNC: 0.38 MG/L FEU (ref 0–0.65)
DIAGNOSIS, 93000: NORMAL
GLUCOSE BLD-MCNC: 152 MG/DL (ref 65–100)
HCT VFR BLD CALC: 36 % (ref 35–47)
P-R INTERVAL, ECG05: 152 MS
POTASSIUM BLD-SCNC: 4 MMOL/L (ref 3.5–5.1)
Q-T INTERVAL, ECG07: 362 MS
QRS DURATION, ECG06: 86 MS
QTC CALCULATION (BEZET), ECG08: 420 MS
SAMPLES BEING HELD,HOLD: NORMAL
SERVICE CMNT-IMP: ABNORMAL
SODIUM BLD-SCNC: 141 MMOL/L (ref 136–145)
TROPONIN I BLD-MCNC: <0.04 NG/ML (ref 0–0.08)
VENTRICULAR RATE, ECG03: 81 BPM

## 2019-04-29 PROCEDURE — 80047 BASIC METABLC PNL IONIZED CA: CPT

## 2019-04-29 PROCEDURE — 84484 ASSAY OF TROPONIN QUANT: CPT

## 2019-04-29 PROCEDURE — 93971 EXTREMITY STUDY: CPT

## 2019-04-29 PROCEDURE — 99284 EMERGENCY DEPT VISIT MOD MDM: CPT

## 2019-04-29 PROCEDURE — 36415 COLL VENOUS BLD VENIPUNCTURE: CPT

## 2019-04-29 PROCEDURE — 85379 FIBRIN DEGRADATION QUANT: CPT

## 2019-04-29 PROCEDURE — 93005 ELECTROCARDIOGRAM TRACING: CPT

## 2019-04-29 RX ORDER — METFORMIN HYDROCHLORIDE 1000 MG/1
2000 TABLET ORAL DAILY
COMMUNITY
End: 2020-04-17 | Stop reason: SDUPTHER

## 2019-04-29 NOTE — ED TRIAGE NOTES
C/o left leg cramp that started yesterday. Also c/o chest pain and SOB that started today. Appears in no acute distress in triage. Recently placed on Steroid dose pack started Friday for hip pain and leg sensitivity.

## 2019-04-29 NOTE — DISCHARGE INSTRUCTIONS
Patient Education      elevate the leg  Apply ice  Gentle stretches  Return for any new or worsening. [place], [place]    Shortness of Breath: Care Instructions  Your Care Instructions  Shortness of breath has many causes. Sometimes conditions such as anxiety can lead to shortness of breath. Some people get mild shortness of breath when they exercise. Trouble breathing also can be a symptom of a serious problem, such as asthma, lung disease, emphysema, heart problems, and pneumonia. If your shortness of breath continues, you may need tests and treatment. Watch for any changes in your breathing and other symptoms. Follow-up care is a key part of your treatment and safety. Be sure to make and go to all appointments, and call your doctor if you are having problems. It's also a good idea to know your test results and keep a list of the medicines you take. How can you care for yourself at home? · Do not smoke or allow others to smoke around you. If you need help quitting, talk to your doctor about stop-smoking programs and medicines. These can increase your chances of quitting for good. · Get plenty of rest and sleep. · Take your medicines exactly as prescribed. Call your doctor if you think you are having a problem with your medicine. · Find healthy ways to deal with stress. ? Exercise daily. ? Get plenty of sleep. ? Eat regularly and well. When should you call for help? Call 911 anytime you think you may need emergency care. For example, call if:    · You have severe shortness of breath.     · You have symptoms of a heart attack. These may include:  ? Chest pain or pressure, or a strange feeling in the chest.  ? Sweating. ? Shortness of breath. ? Nausea or vomiting. ? Pain, pressure, or a strange feeling in the back, neck, jaw, or upper belly or in one or both shoulders or arms. ? Lightheadedness or sudden weakness. ? A fast or irregular heartbeat.   After you call 911, the  may tell you to chew 1 adult-strength or 2 to 4 low-dose aspirin. Wait for an ambulance. Do not try to drive yourself.    Call your doctor now or seek immediate medical care if:    · Your shortness of breath gets worse or you start to wheeze. Wheezing is a high-pitched sound when you breathe.     · You wake up at night out of breath or have to prop your head up on several pillows to breathe.     · You are short of breath after only light activity or while at rest.    Watch closely for changes in your health, and be sure to contact your doctor if:    · You do not get better over the next 1 to 2 days. Where can you learn more? Go to http://karol-eugene.info/. Enter S780 in the search box to learn more about \"Shortness of Breath: Care Instructions. \"  Current as of: September 5, 2018  Content Version: 11.9  © 5674-8202 [employer]. Care instructions adapted under license by [employer] (which disclaims liability or warranty for this information). If you have questions about a medical condition or this instruction, always ask your healthcare professional. [place] [address] any warranty or liability for your use of this information. Patient Education        Leg Pain: Care Instructions  Your Care Instructions  Many things can cause leg pain. Too much exercise or overuse can cause a muscle cramp (or charley horse). You can get leg cramps from not eating a balanced diet that has enough potassium, calcium, and other minerals. If you do not drink enough fluids or are taking certain medicines, you may develop leg cramps. Other causes of leg pain include injuries, blood flow problems, nerve damage, and twisted and enlarged veins (varicose veins). You can usually ease pain with self-care. Your doctor may recommend that you rest your leg and keep it elevated. Follow-up care is a key part of your treatment and safety.  Be sure to make and go to all appointments, and call your doctor if you are having problems. It's also a good idea to know your test results and keep a list of the medicines you take. How can you care for yourself at home? · Take pain medicines exactly as directed. ? If the doctor gave you a prescription medicine for pain, take it as prescribed. ? If you are not taking a prescription pain medicine, ask your doctor if you can take an over-the-counter medicine. · Take any other medicines exactly as prescribed. Call your doctor if you think you are having a problem with your medicine. · Rest your leg while you have pain, and avoid standing for long periods of time. · Prop up your leg at or above the level of your heart when possible. · Make sure you are eating a balanced diet that is rich in calcium, potassium, and magnesium, especially if you are pregnant. · If directed by your doctor, put ice or a cold pack on the area for 10 to 20 minutes at a time. Put a thin cloth between the ice and your skin. · Your leg may be in a splint, a brace, or an elastic bandage, and you may have crutches to help you walk. Follow your doctor's directions about how long to wear supports and how to use the crutches. When should you call for help? Call 911 anytime you think you may need emergency care. For example, call if:    · You have sudden chest pain and shortness of breath, or you cough up blood.     · Your leg is cool or pale or changes color.    Call your doctor now or seek immediate medical care if:    · You have increasing or severe pain.     · Your leg suddenly feels weak and you cannot move it.     · You have signs of a blood clot, such as:  ? Pain in your calf, back of the knee, thigh, or groin. ? Redness and swelling in your leg or groin.     · You have signs of infection, such as:  ? Increased pain, swelling, warmth, or redness. ? Red streaks leading from the sore area. ? Pus draining from a place on your leg. ? A fever.     · You cannot bear weight on your leg.  Watch closely for changes in your health, and be sure to contact your doctor if:    · You do not get better as expected. Where can you learn more? Go to http://karol-eugene.info/. Enter Q818 in the search box to learn more about \"Leg Pain: Care Instructions. \"  Current as of: September 23, 2018  Content Version: 11.9  © 4181-8469 [employer]. Care instructions adapted under license by [employer] (which disclaims liability or warranty for this information). If you have questions about a medical condition or this instruction, always ask your healthcare professional. [place] [address] any warranty or liability for your use of this information.

## 2019-04-29 NOTE — ED PROVIDER NOTES
42 yo female with complaint of left calf area pain, similar to rian horse but different. Today developed heart palpitations and SOB. No leg injury. Had rt hip pain three days ago. Left thigh was sensitive to touch for a while. Currently on prednisone as prescribed by [facility]. Pain in calf 6/10 with walking and flexing the toes. No hx of blood clots. Went to [facility] in March. Occasional tobacco use. No OCP. No fever, lightheaeded, dizziness, runny nose, cough, congestion, abdominal pain, nausea, vomiting or urinary complaint. Some rt sided chest pain earlier today. The history is provided by the patient. Leg Pain Pertinent negatives include no numbness. Chest Pain (Angina) Associated symptoms include leg pain and shortness of breath. Pertinent negatives include no abdominal pain, no cough, no dizziness, no fever, no headaches, no nausea, no numbness and no vomiting. Shortness of Breath Associated symptoms include chest pain and leg pain. Pertinent negatives include no fever, no headaches, no rhinorrhea, no sore throat, no ear pain, no cough, no vomiting, no abdominal pain, no rash and no leg swelling. Past Medical History:  
Diagnosis Date  Abnormal Pap smear   
 2002, f/u WNL  Chlamydia 8/13  Diabetes ([facility] [address] GDM - diet controlled  Genital herpes, unspecified 2002  
 no outbreaks in 10 years, taking valtrex  GERD (gastroesophageal reflux disease)  Hernia, hiatal 06/18/2018  Impaired glucose tolerance  Infertility, female Took clomid for pregnancy  PCOS (polycystic ovarian syndrome)  Polycystic disease, ovaries  Unspecified vitamin D deficiency Past Surgical History:  
Procedure Laterality Date  HX CHOLECYSTECTOMY  07/11/2018 Laparoscopic cholecystectomy with cholangiogram, interpretation of cholangiogram   
 HX NEPHRECTOMY  2003  
 patient donated her kidney  HX OTHER SURGICAL  2003 donated Kidney to father  HX OTHER SURGICAL  2012  
 tonsillectomy  HX POLYPECTOMY  2018  HX TONSILLECTOMY    HX TRANSPLANT    
 donation of right kidney in  to her father Family History:  
Problem Relation Age of Onset  Thyroid Disease Mother  Hypertension Father  Diabetes Father  Kidney Disease Father  Asthma Sister  MS Maternal Grandmother  Cancer Paternal Grandmother   
     endometrial  
 Diabetes Paternal Grandmother  Hypertension Paternal Grandmother  Heart Disease Paternal Uncle  Heart Attack Paternal Uncle Social History Socioeconomic History  Marital status:  Spouse name: Not on file  Number of children: Not on file  Years of education: Not on file  Highest education level: Not on file Occupational History  Not on file Social Needs  Financial resource strain: Not on file  Food insecurity:  
  Worry: Not on file Inability: Not on file  Transportation needs:  
  Medical: Not on file Non-medical: Not on file Tobacco Use  Smoking status: Former Smoker Packs/day: 0.25 Years: 10.00 Pack years: 2.50 Last attempt to quit: 2013 Years since quittin.3  Smokeless tobacco: Never Used Substance and Sexual Activity  Alcohol use: Yes Alcohol/week: 0.5 oz Types: 1 Standard drinks or equivalent per week Comment: once a week  Drug use: No  
  Comment: denies  Sexual activity: Yes  
  Partners: Female Birth control/protection: None Comment: single Lifestyle  Physical activity:  
  Days per week: Not on file Minutes per session: Not on file  Stress: Not on file Relationships  Social connections:  
  Talks on phone: Not on file Gets together: Not on file Attends Zoroastrian service: Not on file Active member of club or organization: Not on file Attends meetings of clubs or organizations: Not on file Relationship status: Not on file  Intimate partner violence:  
  Fear of current or ex partner: Not on file Emotionally abused: Not on file Physically abused: Not on file Forced sexual activity: Not on file Other Topics Concern  Not on file Social History Narrative Lives in [address] [place] [address] with parents. No children. 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 ALLERGIES: Patient has no known allergies. Review of Systems Constitutional: Negative. Negative for chills, fatigue and fever. HENT: Negative. Negative for congestion, ear pain, facial swelling, rhinorrhea, sneezing and sore throat. Eyes: Negative for pain, discharge and itching. Respiratory: Positive for shortness of breath. Negative for cough and chest tightness. Cardiovascular: Positive for chest pain. Negative for leg swelling. Gastrointestinal: Negative. Negative for abdominal pain, constipation, diarrhea, nausea and vomiting. Genitourinary: Negative for difficulty urinating, frequency and urgency. Musculoskeletal: Positive for arthralgias (left calf). Negative for neck stiffness. Skin: Negative for rash. Neurological: Negative for dizziness, numbness and headaches. All other systems reviewed and are negative. There were no vitals filed for this visit. Physical Exam  
Constitutional: She is oriented to person, place, and time. She appears well-developed and well-nourished. No distress. Well appearing AAF pleasant in NAD HENT:  
Head: Normocephalic and atraumatic. Left Ear: External ear normal.  
Eyes: Pupils are equal, round, and reactive to light. Conjunctivae are normal.  
Neck: Normal range of motion. Neck supple. Cardiovascular: Normal rate, regular rhythm and normal heart sounds. Pulmonary/Chest: Effort normal. No respiratory distress. She has no wheezes. Abdominal: Soft. Bowel sounds are normal. She exhibits no distension. There is no tenderness. There is no rebound. Musculoskeletal: Normal range of motion. Legs: 
Neurological: She is alert and oriented to person, place, and time. Skin: Skin is warm and dry. No ecchymosis, no laceration and no lesion noted. Nursing note and vitals reviewed. MDM Number of Diagnoses or Management Options Diagnosis management comments: 40 yo AAF with complaint of left calf pain, palpitations and SOB. Concern for PE/DVT vs muscular pain amongst others Plan EKG Trop D dimer Duplex LLE Procedures Progress note Duplex - DVt. D dimer not elevated. eKG non-ischemic. Trop -. Jenny BRISCOE [place], [place] Patient's results have been reviewed with them. Patient and/or family have verbally conveyed their understanding and agreement of the patient's signs, symptoms, diagnosis, treatment and prognosis and additionally agree to follow up as recommended or return to the Emergency Room should their condition change prior to follow-up. Discharge instructions have also been provided to the patient with some educational information regarding their diagnosis as well a list of reasons why they would want to return to the ER prior to their follow-up appointment should their condition change.  Keerthi Tam [place]

## 2019-04-29 NOTE — ED TRIAGE NOTES
TRIAGE: In addition to previous note, pt also reports palpitations, R hip pain 6/10 beginning Friday. L calf pain began Sunday.

## 2020-03-12 ENCOUNTER — OFFICE VISIT (OUTPATIENT)
Dept: INTERNAL MEDICINE CLINIC | Age: 40
End: 2020-03-12

## 2020-03-12 DIAGNOSIS — Z00.00 WELLNESS EXAMINATION: Primary | ICD-10-CM

## 2020-03-12 DIAGNOSIS — E66.01 SEVERE OBESITY (HCC): ICD-10-CM

## 2020-03-12 DIAGNOSIS — E55.9 VITAMIN D DEFICIENCY: ICD-10-CM

## 2020-03-12 DIAGNOSIS — E28.2 PCOS (POLYCYSTIC OVARIAN SYNDROME): ICD-10-CM

## 2020-03-12 DIAGNOSIS — Z23 ENCOUNTER FOR IMMUNIZATION: ICD-10-CM

## 2020-03-12 NOTE — PROGRESS NOTES
[facility]  Timmy Ramirez. MD [place]  [address]  [id number]    Chief Complaint   Patient presents with    Physical       SUBJECTIVE:    Francisco Day is a 42 y. o.new female patient for a physical. PMH outlined below. Wants vitamin D level checked. Not lactose intolerant. LMP : UNDER 3-5 YR  Last Pap (q 3 years, or q5 with HPV) :  Hx of abnl Pap - YES  Last Mammogram (50-74 biennially): NA  Hx abnl mammogram:     Contraception: NONE    1. Do you follow a low fat diet? NO  2. Are you up to date on your Tdap (<10 years)? NOT UTD  3. Have you ever had a Pneumovax vaccine (>65)                PCV13               PPSV23   4. Have you had Zoster vaccine (>50)? 5.  Have you had the HPV - Gardasil (13- 46)? 7.  Do you smoke? NO  8. Do you consider yourself overweight? YES  9. Is there a family history of CAD< age 48? NO  10. Is there a family history of Cancer? YES, NOT 1ST DEGREE  11. Do you know your Cancer risks? 12.  Have you had a colonoscopy? YES  13. Have you been tested for HIV or other STI's?  (18-71 y/o)? YES  14.  Have had a bone density scan or DEXA done(>65)? NO  15.  Have you had an EKG performed in the last five years (>50)? YES   LMP :  Postmenopausal bleeding: NA  Menstrual irregularity:  Abnormal pap history:   Last pap smear:   Abnormal mammogram history: no  Last mammogram date:   Colonoscopy:   DEXA scan and T-score: no prior study  GYN/ diagnoses: NO  GYN/ surgery:NO  Family hx of female cancer:   Gravity/parity:    Contraception:  NO   Menarche:  17yo  Sexually active: YES  Condoms:   Vaginitis: YES  BV HX MILKY WHITE WILL CALL DR/FILL EXISITNG FLAGYL SCRIPT  Urinary complaints: YES PRESSURE /UR FREQ X 2 DAYSWILL CALL     Current Outpatient Medications   Medication Sig Dispense Refill    metFORMIN (GLUCOPHAGE) 1,000 mg tablet Take 2,000 mg by mouth daily.        Past Medical History:   Diagnosis Date    Abnormal Pap smear     , f/u WNL    Chlamydia         Diabetes ([facility] [address]     GDM - diet controlled    Genital herpes, unspecified     no outbreaks in 10 years, taking valtrex    GERD (gastroesophageal reflux disease)     Hernia, hiatal 2018    Impaired glucose tolerance     Infertility, female     Took clomid for pregnancy    PCOS (polycystic ovarian syndrome)     Polycystic disease, ovaries     Unspecified vitamin D deficiency      Past Surgical History:   Procedure Laterality Date    HX CHOLECYSTECTOMY  2018     Laparoscopic cholecystectomy with cholangiogram, interpretation of cholangiogram     HX NEPHRECTOMY      patient donated her kidney    HX OTHER SURGICAL      donated Kidney to father     HX OTHER SURGICAL      tonsillectomy    HX POLYPECTOMY  2018    HX TONSILLECTOMY      HX TRANSPLANT      donation of right kidney in  to her father     No Known Allergies    REVIEW OF SYSTEMS:  Review of Systems   Constitutional: 20lb wt loss on keto diet since 2020   HENT: Negative. Eyes: Negative. Negative for blurred vision. Respiratory: Negative. Cardiovascular: Negative. Gastrointestinal: Negative. Genitourinary: Positive for frequency. Negative for dysuria, flank pain, hematuria and urgency. Musculoskeletal: Negative. Skin: Negative. Neurological: Negative. Endo/Heme/Allergies: Positive for environmental allergies. Psychiatric/Behavioral: Positive for depression (SEES THERAPIST, SELF MANAGED CURRENTLY).      Social History     Socioeconomic History    Marital status:      Spouse name: Not on file    Number of children: Not on file    Years of education: Not on file    Highest education level: Not on file   Tobacco Use    Smoking status: Former Smoker     Packs/day: 0.25     Years: 10.00     Pack years: 2.50     Last attempt to quit: 2013     Years since quittin.2    Smokeless tobacco: Never Used   Substance and Sexual Activity    Alcohol use: Yes     Alcohol/week: 0.8 standard drinks     Types: 1 Standard drinks or equivalent per week     Comment: once a week    Drug use: No     Comment: denies     Sexual activity: Yes     Partners: Female     Birth control/protection: None     Comment: single   Social History Narrative    Lives in [address] [place] [address] with parents. No children. 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 Family History   Problem Relation Age of Onset    Thyroid Disease Mother     Hypertension Father     Diabetes Father     Kidney Disease Father     Asthma Sister     MS Maternal Grandmother     Cancer Paternal Grandmother         endometrial    Diabetes Paternal Grandmother     Hypertension Paternal Grandmother     Heart Disease Paternal Uncle     Heart Attack Paternal Uncle        OBJECTIVE:     Visit Vitals  /81   Pulse 74   Temp 98.5 °F (36.9 °C) (Oral)   Resp 16   Ht 5' 5.5\" (1.664 m)   Wt 235 lb 11.2 oz (106.9 kg)   SpO2 99%   BMI 38.63 kg/m²     Physical Exam  Vitals signs and nursing note reviewed. Constitutional:       General: She is not in acute distress. Appearance: She is obese. HENT:      Head:      Comments: CERUMEN   HISUTE     Right Ear: Tympanic membrane normal.      Left Ear: Tympanic membrane normal.      Mouth/Throat:      Mouth: Mucous membranes are moist.      Pharynx: Oropharynx is clear. Eyes:      Extraocular Movements: Extraocular movements intact. Conjunctiva/sclera: Conjunctivae normal.      Pupils: Pupils are equal, round, and reactive to light. Neck:      Musculoskeletal: Normal range of motion and neck supple. Vascular: No carotid bruit. Cardiovascular:      Rate and Rhythm: Normal rate and regular rhythm. Pulses: Normal pulses. Heart sounds: Normal heart sounds. Pulmonary:      Effort: Pulmonary effort is normal.      Breath sounds: Normal breath sounds.    Abdominal: General: Bowel sounds are normal.      Palpations: Abdomen is soft. There is no mass. Tenderness: There is no abdominal tenderness. There is no right CVA tenderness, left CVA tenderness or guarding. Musculoskeletal: Normal range of motion. General: No swelling or deformity. Right lower leg: No edema. Lymphadenopathy:      Cervical: No cervical adenopathy. Skin:     General: Skin is warm and dry. Neurological:      General: No focal deficit present. Mental Status: She is alert and oriented to person, place, and time. Psychiatric:         Mood and Affect: Mood normal.         Behavior: Behavior normal.     ASSESSMENT:   1. Wellness examination    2. Severe obesity ([address]    3. PCOS (polycystic ovarian syndrome)    4. Vitamin D deficiency    5. Encounter for immunization        I have discussed the diagnosis with the patient and the intended plan as seen in the  orders. The patient understands and agees with the plan. The patient has   received an after visit summary and questions were answered concerning  future plans  Patient labs and/or xrays were reviewed  Past records were reviewed. PLAN:  .  Orders Placed This Encounter    Tetanus and diphtheria toxoid (TD) adsorbed, pres.  free,, in individuals >=7 years, IM    HEMOGLOBIN A1C WITH EAG    CBC WITH AUTOMATED DIFF    METABOLIC PANEL, COMPREHENSIVE    LIPID PANEL    URINALYSIS W/ RFLX MICROSCOPIC    VITAMIN D, 25 HYDROXY    TSH REFLEX TO T4   Counseled regarding diet, exercise and healthy lifestyle    Follow-up and Dispositions    · Return in about 1 year (around 3/12/2021) for annual wellness exam.         KETO 1/13  20 LB WT LOSS

## 2020-03-12 NOTE — PROGRESS NOTES
Chief Complaint   Patient presents with    Physical     1. Have you been to the ER, urgent care clinic since your last visit? Hospitalized since your last visit? No    2. Have you seen or consulted any other health care providers outside of the [address] [place] [address] since your last visit? Include any pap smears or colon screening.  No

## 2020-03-12 NOTE — PATIENT INSTRUCTIONS
Well Visit, Ages 25 to 48: Care Instructions Your Care Instructions Physical exams can help you stay healthy. Your doctor has checked your overall health and may have suggested ways to take good care of yourself. He or she also may have recommended tests. At home, you can help prevent illness with healthy eating, regular exercise, and other steps. Follow-up care is a key part of your treatment and safety. Be sure to make and go to all appointments, and call your doctor if you are having problems. It's also a good idea to know your test results and keep a list of the medicines you take. How can you care for yourself at home? · Reach and stay at a healthy weight. This will lower your risk for many problems, such as obesity, diabetes, heart disease, and high blood pressure. · Get at least 30 minutes of physical activity on most days of the week. Walking is a good choice. You also may want to do other activities, such as running, swimming, cycling, or playing tennis or team sports. Discuss any changes in your exercise program with your doctor. · Do not smoke or allow others to smoke around you. If you need help quitting, talk to your doctor about stop-smoking programs and medicines. These can increase your chances of quitting for good. · Talk to your doctor about whether you have any risk factors for sexually transmitted infections (STIs). Having one sex partner (who does not have STIs and does not have sex with anyone else) is a good way to avoid these infections. · Use birth control if you do not want to have children at this time. Talk with your doctor about the choices available and what might be best for you. · Protect your skin from too much sun. When you're outdoors from 10 a.m. to 4 p.m., stay in the shade or cover up with clothing and a hat with a wide brim. Wear sunglasses that block UV rays. Even when it's cloudy, put broad-spectrum sunscreen (SPF 30 or higher) on any exposed skin. · See a dentist one or two times a year for checkups and to have your teeth cleaned. · Wear a seat belt in the car. Follow your doctor's advice about when to have certain tests. These tests can spot problems early. For everyone · Cholesterol. Have the fat (cholesterol) in your blood tested after age 21. Your doctor will tell you how often to have this done based on your age, family history, or other things that can increase your risk for heart disease. · Blood pressure. Have your blood pressure checked during a routine doctor visit. Your doctor will tell you how often to check your blood pressure based on your age, your blood pressure results, and other factors. · Vision. Talk with your doctor about how often to have a glaucoma test. 
· Diabetes. Ask your doctor whether you should have tests for diabetes. · Colon cancer. Your risk for colorectal cancer gets higher as you get older. Some experts say that adults should start regular screening at age 48 and stop at age 76. Others say to start before age 48 or continue after age 76. Talk with your doctor about your risk and when to start and stop screening. For women · Breast exam and mammogram. Talk to your doctor about when you should have a clinical breast exam and a mammogram. Medical experts differ on whether and how often women under 50 should have these tests. Your doctor can help you decide what is right for you. · Cervical cancer screening test and pelvic exam. Begin with a Pap test at age 24. The test often is part of a pelvic exam. Starting at age 27, you may choose to have a Pap test, an HPV test, or both tests at the same time (called co-testing). Talk with your doctor about how often to have testing. · Tests for sexually transmitted infections (STIs). Ask whether you should have tests for STIs. You may be at risk if you have sex with more than one person, especially if your partners do not wear condoms. For men · Tests for sexually transmitted infections (STIs). Ask whether you should have tests for STIs. You may be at risk if you have sex with more than one person, especially if you do not wear a condom. · Testicular cancer exam. Ask your doctor whether you should check your testicles regularly. · Prostate exam. Talk to your doctor about whether you should have a blood test (called a PSA test) for prostate cancer. Experts differ on whether and when men should have this test. Some experts suggest it if you are older than 39 and are -American or have a father or brother who got prostate cancer when he was younger than 72. When should you call for help? Watch closely for changes in your health, and be sure to contact your doctor if you have any problems or symptoms that concern you. Where can you learn more? Go to http://karol-eugene.info/ Enter P072 in the search box to learn more about \"Well Visit, Ages 25 to 48: Care Instructions. \" Current as of: August 21, 2019Content Version: 12.4 © 3680-0820 Healthwise, Incorporated. Care instructions adapted under license by [employer] (which disclaims liability or warranty for this information). If you have questions about a medical condition or this instruction, always ask your healthcare professional. [address] any warranty or liability for your use of this information. Tetanus and Diphtheria Booster: Care Instructions Your Care Instructions A diphtheria and tetanus (Td) vaccine protects people against diphtheria and tetanus (lockjaw). You need a Td shot every 10 years to help prevent these diseases, which can be fatal. 
You may also need a Td booster if you get a puncture wound or dirty cut. A booster is another dose of the vaccine. Young teens get a booster at 6to 15years of age. This booster is called Tdap and includes the vaccine against pertussis (whooping cough).  All teens and adults who never had Tdap also need one dose of this vaccine. Common side effects of Td vaccination include soreness in the arm where you got the shot and a mild fever. These usually occur within 3 days of the shot and last a short time. Follow-up care is a key part of your treatment and safety. Be sure to make and go to all appointments, and call your doctor if you are having problems. It's also a good idea to know your test results and keep a list of the medicines you take. How can you care for yourself at home? · Take an over-the-counter pain medicine, such as acetaminophen (Tylenol), ibuprofen (Advil, Motrin), or naproxen (Aleve), if your arm is sore after the shot. Be safe with medicines. Read and follow all instructions on the label. Do not give aspirin to anyone younger than 20. It has been linked to Reye syndrome, a serious illness. · Put ice or a cold pack on the sore area for 10 to 20 minutes at a time. Put a thin cloth between the ice and your skin. When should you call for help? Call 911 anytime you think you may need emergency care. For example, call if: 
  · You have a seizure.  
  · You have symptoms of a severe allergic reaction. These may include: 
? Sudden raised, red areas (hives) all over your body. ? Swelling of the throat, mouth, lips, or tongue. ? Trouble breathing. ? Passing out (losing consciousness). Or you may feel very lightheaded or suddenly feel weak, confused, or restless.  
 Call your doctor now or seek immediate medical care if: 
  · You have symptoms of an allergic reaction, such as: ? A rash or hives (raised, red areas on the skin). ? Itching. ? Swelling. ? Belly pain, nausea, or vomiting.  
  · You have a high fever.  
 Watch closely for changes in your health, and be sure to contact your doctor if you have any problems. Where can you learn more? Go to http://karol-eugene.info/ Enter V332 in the search box to learn more about \"Tetanus and Diphtheria Booster: Care Instructions. \" Current as of: December 8, 2019Content Version: 12.4 © 4206-5107 Healthwise, Incorporated. Care instructions adapted under license by [employer] (which disclaims liability or warranty for this information). If you have questions about a medical condition or this instruction, always ask your healthcare professional. [place] [address] any warranty or liability for your use of this information. Tetanus and Diphtheria Booster: Care Instructions Your Care Instructions A diphtheria and tetanus (Td) vaccine protects people against diphtheria and tetanus (lockjaw). You need a Td shot every 10 years to help prevent these diseases, which can be fatal. 
You may also need a Td booster if you get a puncture wound or dirty cut. A booster is another dose of the vaccine. Young teens get a booster at 6to 15years of age. This booster is called Tdap and includes the vaccine against pertussis (whooping cough). All teens and adults who never had Tdap also need one dose of this vaccine. Common side effects of Td vaccination include soreness in the arm where you got the shot and a mild fever. These usually occur within 3 days of the shot and last a short time. Follow-up care is a key part of your treatment and safety. Be sure to make and go to all appointments, and call your doctor if you are having problems. It's also a good idea to know your test results and keep a list of the medicines you take. How can you care for yourself at home? · Take an over-the-counter pain medicine, such as acetaminophen (Tylenol), ibuprofen (Advil, Motrin), or naproxen (Aleve), if your arm is sore after the shot. Be safe with medicines. Read and follow all instructions on the label. Do not give aspirin to anyone younger than 20. It has been linked to Reye syndrome, a serious illness. · Put ice or a cold pack on the sore area for 10 to 20 minutes at a time. Put a thin cloth between the ice and your skin. When should you call for help? Call 911 anytime you think you may need emergency care. For example, call if: 
  · You have a seizure.  
  · You have symptoms of a severe allergic reaction. These may include: 
? Sudden raised, red areas (hives) all over your body. ? Swelling of the throat, mouth, lips, or tongue. ? Trouble breathing. ? Passing out (losing consciousness). Or you may feel very lightheaded or suddenly feel weak, confused, or restless.  
 Call your doctor now or seek immediate medical care if: 
  · You have symptoms of an allergic reaction, such as: ? A rash or hives (raised, red areas on the skin). ? Itching. ? Swelling. ? Belly pain, nausea, or vomiting.  
  · You have a high fever.  
 Watch closely for changes in your health, and be sure to contact your doctor if you have any problems. Where can you learn more? Go to http://karol-eugene.info/ Enter V332 in the search box to learn more about \"Tetanus and Diphtheria Booster: Care Instructions. \" Current as of: December 8, 2019Content Version: 12.4 © 0340-0903 Healthwise, Incorporated. Care instructions adapted under license by [employer] (which disclaims liability or warranty for this information). If you have questions about a medical condition or this instruction, always ask your healthcare professional. [place] [address] any warranty or liability for your use of this information. Learning About Diabetes Food Guidelines Your Care Instructions Meal planning is important to manage diabetes. It helps keep your blood sugar at a target level (which you set with your doctor). You don't have to eat special foods. You can eat what your family eats, including sweets once in a while.  But you do have to pay attention to how often you eat and how much you eat of certain foods. You may want to work with a dietitian or a certified diabetes educator (CDE) to help you plan meals and snacks. A dietitian or CDE can also help you lose weight if that is one of your goals. What should you know about eating carbs? Managing the amount of carbohydrate (carbs) you eat is an important part of healthy meals when you have diabetes. Carbohydrate is found in many foods. · Learn which foods have carbs. And learn the amounts of carbs in different foods. ? Bread, cereal, pasta, and rice have about 15 grams of carbs in a serving. A serving is 1 slice of bread (1 ounce), ½ cup of cooked cereal, or 1/3 cup of cooked pasta or rice. ? Fruits have 15 grams of carbs in a serving. A serving is 1 small fresh fruit, such as an apple or orange; ½ of a banana; ½ cup of cooked or canned fruit; ½ cup of fruit juice; 1 cup of melon or raspberries; or 2 tablespoons of dried fruit. ? Milk and no-sugar-added yogurt have 15 grams of carbs in a serving. A serving is 1 cup of milk or 2/3 cup of no-sugar-added yogurt. ? Starchy vegetables have 15 grams of carbs in a serving. A serving is ½ cup of mashed potatoes or sweet potato; 1 cup winter squash; ½ of a small baked potato; ½ cup of cooked beans; or ½ cup cooked corn or green peas. · Learn how much carbs to eat each day and at each meal. A dietitian or CDE can teach you how to keep track of the amount of carbs you eat. This is called carbohydrate counting. · If you are not sure how to count carbohydrate grams, use the Plate Method to plan meals. It is a good, quick way to make sure that you have a balanced meal. It also helps you spread carbs throughout the day. ? Divide your plate by types of foods. Put non-starchy vegetables on half the plate, meat or other protein food on one-quarter of the plate, and a grain or starchy vegetable in the final quarter of the plate.  To this you can add a small piece of fruit and 1 cup of milk or yogurt, depending on how many carbs you are supposed to eat at a meal. 
· Try to eat about the same amount of carbs at each meal. Do not \"save up\" your daily allowance of carbs to eat at one meal. 
· Proteins have very little or no carbs per serving. Examples of proteins are beef, chicken, turkey, fish, eggs, tofu, cheese, cottage cheese, and peanut butter. A serving size of meat is 3 ounces, which is about the size of a deck of cards. Examples of meat substitute serving sizes (equal to 1 ounce of meat) are 1/4 cup of cottage cheese, 1 egg, 1 tablespoon of peanut butter, and ½ cup of tofu. How can you eat out and still eat healthy? · Learn to estimate the serving sizes of foods that have carbohydrate. If you measure food at home, it will be easier to estimate the amount in a serving of restaurant food. · If the meal you order has too much carbohydrate (such as potatoes, corn, or baked beans), ask to have a low-carbohydrate food instead. Ask for a salad or green vegetables. · If you use insulin, check your blood sugar before and after eating out to help you plan how much to eat in the future. · If you eat more carbohydrate at a meal than you had planned, take a walk or do other exercise. This will help lower your blood sugar. What else should you know? · Limit saturated fat, such as the fat from meat and dairy products. This is a healthy choice because people who have diabetes are at higher risk of heart disease. So choose lean cuts of meat and nonfat or low-fat dairy products. Use olive or canola oil instead of butter or shortening when cooking. · Don't skip meals. Your blood sugar may drop too low if you skip meals and take insulin or certain medicines for diabetes. · Check with your doctor before you drink alcohol. Alcohol can cause your blood sugar to drop too low. Alcohol can also cause a bad reaction if you take certain diabetes medicines. Follow-up care is a key part of your treatment and safety. Be sure to make and go to all appointments, and call your doctor if you are having problems. It's also a good idea to know your test results and keep a list of the medicines you take. Where can you learn more? Go to http://karol-eugene.info/ Enter V898 in the search box to learn more about \"Learning About Diabetes Food Guidelines. \" Current as of: December 19, 2019Content Version: 12.4 © 3020-3754 [employer]. Care instructions adapted under license by [employer] (which disclaims liability or warranty for this information). If you have questions about a medical condition or this instruction, always ask your healthcare professional. [address] any warranty or liability for your use of this information. Nutrition Tips for Diabetes: After Your Visit Your Care Instructions A healthy diet is important to manage diabetes. It helps you lose weight (if you need to) and keep it off. It gives you the nutrition and energy your body needs and helps prevent heart disease. But a diet for diabetes does not mean that you have to eat special foods. You can eat what your family eats, including occasional sweets and other favorites. But you do have to pay attention to how often you eat and how much you eat of certain foods. The right plan for you will give you meals that help you keep your blood sugar at healthy levels. Try to eat a variety of foods and to spread carbohydrate throughout the day. Carbohydrate raises blood sugar higher and more quickly than any other nutrient does. Carbohydrate is found in sugar, breads and cereals, fruit, starchy vegetables such as potatoes and corn, and milk and yogurt. You may want to work with a dietitian or diabetes educator to help you plan meals and snacks.  A dietitian or diabetes educator also can help you lose weight if that is one of your goals. The following tips can help you enjoy your meals and stay healthy. Follow-up care is a key part of your treatment and safety. Be sure to make and go to all appointments, and call your doctor if you are having problems. Its also a good idea to know your test results and keep a list of the medicines you take. How can you care for yourself at home? · Learn which foods have carbohydrate and how much carbohydrate to eat. A dietitian or diabetes educator can help you learn to keep track of how much carbohydrate you eat. · Spread carbohydrate throughout the day. Eat some carbohydrate at all meals, but do not eat too much at any one time. · Plan meals to include food from all the food groups. These are the food groups and some example portion sizes: ¨ Grains: 1 slice of bread (1 ounce), ½ cup of cooked cereal, and 1/3 cup of cooked pasta or rice. These have about 15 grams of carbohydrate in a serving. Choose whole grains such as whole wheat bread or crackers, oatmeal, and brown rice more often than refined grains. ¨ Fruit: 1 small fresh fruit, such as an apple or orange; ½ of a banana; ½ cup of chopped, cooked, or canned fruit; ½ cup of fruit juice; 1 cup of melon or raspberries; and 2 tablespoons of dried fruit. These have about 15 grams of carbohydrate in a serving. ¨ Dairy: 1 cup of nonfat or low-fat milk and 2/3 cup of plain yogurt. These have about 15 grams of carbohydrate in a serving. ¨ Protein foods: Beef, chicken, turkey, fish, eggs, tofu, cheese, cottage cheese, and peanut butter. A serving size of meat is 3 ounces, which is about the size of a deck of cards. Examples of meat substitute serving sizes (equal to 1 ounce of meat) are 1/4 cup of cottage cheese, 1 egg, 1 tablespoon of peanut butter, and ½ cup of tofu. These have very little or no carbohydrate per serving.  
¨ Vegetables: Starchy vegetables such as ½ cup of cooked dried beans, peas, potatoes, or corn have about 15 grams of carbohydrate. Nonstarchy vegetables have very little carbohydrate, such as 1 cup of raw leafy vegetables (such as spinach), ½ cup of other vegetables (cooked or chopped), and 3/4 cup of vegetable juice. · Use the plate format to plan meals. It is a good, quick way to make sure that you have a balanced meal. It also helps you spread carbohydrate throughout the day. You divide your plate by types of foods. Put vegetables on half the plate, meat or meat substitutes on one-quarter of the plate, and a grain or starchy vegetable (such as brown rice or a potato) in the final quarter of the plate. To this you can add a small piece of fruit and 1 cup of milk or yogurt, depending on how much carbohydrate you are supposed to eat at a meal. 
· Talk to your dietitian or diabetes educator about ways to add limited amounts of sweets into your meal plan. You can eat these foods now and then, as long as you include the amount of carbohydrate they have in your daily carbohydrate allowance. · If you drink alcohol, limit it to no more than 1 drink a day for women and 2 drinks a day for men. If you are pregnant, no amount of alcohol is known to be safe. · Protein, fat, and fiber do not raise blood sugar as much as carbohydrate does. If you eat a lot of these nutrients in a meal, your blood sugar will rise more slowly than it would otherwise. · Limit saturated fats, such as those from meat and dairy products. Try to replace it with monounsaturated fat, such as olive oil. This is a healthier choice because people who have diabetes are at higher-than-average risk of heart disease. But use a modest amount of olive oil. A tablespoon of olive oil has 14 grams of fat and 120 calories. · Exercise lowers blood sugar. If you take insulin by shots or pump, you can use less than you would if you were not exercising.  Keep in mind that timing matters. If you exercise within 1 hour after a meal, your body may need less insulin for that meal than it would if you exercised 3 hours after the meal. Test your blood sugar to find out how exercise affects your need for insulin. · Exercise on most days of the week. Aim for at least 30 minutes. Exercise helps you stay at a healthy weight and helps your body use insulin. Walking is an easy way to get exercise. Gradually increase the amount you walk every day. You also may want to swim, bike, or do other activities. When you eat out · Learn to estimate the serving sizes of foods that have carbohydrate. If you measure food at home, it will be easier to estimate the amount in a serving of restaurant food. · If the meal you order has too much carbohydrate (such as potatoes, corn, or baked beans), ask to have a low-carbohydrate food instead. Ask for a salad or green vegetables. · If you use insulin, check your blood sugar before and after eating out to help you plan how much to eat in the future. · If you eat more carbohydrate at a meal than you had planned, take a walk or do other exercise. This will help lower your blood sugar. Where can you learn more? Go to [email or web address] Enter V340 in the search box to learn more about \"Nutrition Tips for Diabetes: After Your Visit. \"  
© 2513-7435 Healthwise, Incorporated. Care instructions adapted under license by [facility] (which disclaims liability or warranty for this information). This care instruction is for use with your licensed healthcare professional. If you have questions about a medical condition or this instruction, always ask your healthcare professional. [place] [address] any warranty or liability for your use of this information. Content Version: 60.7.809823; Current as of: June 4, 2014

## 2020-03-13 LAB
25(OH)D3+25(OH)D2 SERPL-MCNC: 15.2 NG/ML (ref 30–100)
ALBUMIN SERPL-MCNC: 4 G/DL (ref 3.8–4.8)
ALBUMIN/GLOB SERPL: 1.6 {RATIO} (ref 1.2–2.2)
ALP SERPL-CCNC: 67 IU/L (ref 39–117)
ALT SERPL-CCNC: 20 IU/L (ref 0–32)
APPEARANCE UR: CLEAR
AST SERPL-CCNC: 19 IU/L (ref 0–40)
BASOPHILS # BLD AUTO: 0.1 X10E3/UL (ref 0–0.2)
BASOPHILS NFR BLD AUTO: 1 %
BILIRUB SERPL-MCNC: 0.7 MG/DL (ref 0–1.2)
BILIRUB UR QL STRIP: NEGATIVE
BUN SERPL-MCNC: 5 MG/DL (ref 6–20)
BUN/CREAT SERPL: 6 (ref 9–23)
CALCIUM SERPL-MCNC: 9.2 MG/DL (ref 8.7–10.2)
CHLORIDE SERPL-SCNC: 100 MMOL/L (ref 96–106)
CHOLEST SERPL-MCNC: 137 MG/DL (ref 100–199)
CO2 SERPL-SCNC: 24 MMOL/L (ref 20–29)
COLOR UR: YELLOW
CREAT SERPL-MCNC: 0.87 MG/DL (ref 0.57–1)
EOSINOPHIL # BLD AUTO: 0.2 X10E3/UL (ref 0–0.4)
EOSINOPHIL NFR BLD AUTO: 2 %
ERYTHROCYTE [DISTWIDTH] IN BLOOD BY AUTOMATED COUNT: 14.6 % (ref 11.7–15.4)
EST. AVERAGE GLUCOSE BLD GHB EST-MCNC: 111 MG/DL
GLOBULIN SER CALC-MCNC: 2.5 G/DL (ref 1.5–4.5)
GLUCOSE SERPL-MCNC: 77 MG/DL (ref 65–99)
GLUCOSE UR QL: NEGATIVE
HBA1C MFR BLD: 5.5 % (ref 4.8–5.6)
HCT VFR BLD AUTO: 37.8 % (ref 34–46.6)
HDLC SERPL-MCNC: 38 MG/DL
HGB BLD-MCNC: 12.8 G/DL (ref 11.1–15.9)
HGB UR QL STRIP: NEGATIVE
IMM GRANULOCYTES # BLD AUTO: 0 X10E3/UL (ref 0–0.1)
IMM GRANULOCYTES NFR BLD AUTO: 0 %
KETONES UR QL STRIP: ABNORMAL
LDLC SERPL CALC-MCNC: 79 MG/DL (ref 0–99)
LEUKOCYTE ESTERASE UR QL STRIP: NEGATIVE
LYMPHOCYTES # BLD AUTO: 2.7 X10E3/UL (ref 0.7–3.1)
LYMPHOCYTES NFR BLD AUTO: 30 %
MCH RBC QN AUTO: 29 PG (ref 26.6–33)
MCHC RBC AUTO-ENTMCNC: 33.9 G/DL (ref 31.5–35.7)
MCV RBC AUTO: 86 FL (ref 79–97)
MICRO URNS: ABNORMAL
MONOCYTES # BLD AUTO: 0.9 X10E3/UL (ref 0.1–0.9)
MONOCYTES NFR BLD AUTO: 10 %
NEUTROPHILS # BLD AUTO: 5.1 X10E3/UL (ref 1.4–7)
NEUTROPHILS NFR BLD AUTO: 57 %
NITRITE UR QL STRIP: NEGATIVE
PH UR STRIP: 5.5 [PH] (ref 5–7.5)
PLATELET # BLD AUTO: 404 X10E3/UL (ref 150–450)
POTASSIUM SERPL-SCNC: 4.1 MMOL/L (ref 3.5–5.2)
PROT SERPL-MCNC: 6.5 G/DL (ref 6–8.5)
PROT UR QL STRIP: NEGATIVE
RBC # BLD AUTO: 4.42 X10E6/UL (ref 3.77–5.28)
SODIUM SERPL-SCNC: 137 MMOL/L (ref 134–144)
SP GR UR: 1.01 (ref 1–1.03)
TRIGL SERPL-MCNC: 100 MG/DL (ref 0–149)
TSH SERPL DL<=0.005 MIU/L-ACNC: 0.99 UIU/ML (ref 0.45–4.5)
UROBILINOGEN UR STRIP-MCNC: 0.2 MG/DL (ref 0.2–1)
VLDLC SERPL CALC-MCNC: 20 MG/DL (ref 5–40)
WBC # BLD AUTO: 8.9 X10E3/UL (ref 3.4–10.8)

## 2020-03-15 VITALS
HEART RATE: 74 BPM | OXYGEN SATURATION: 99 % | RESPIRATION RATE: 16 BRPM | BODY MASS INDEX: 37.88 KG/M2 | HEIGHT: 66 IN | SYSTOLIC BLOOD PRESSURE: 116 MMHG | TEMPERATURE: 98.5 F | DIASTOLIC BLOOD PRESSURE: 81 MMHG | WEIGHT: 235.7 LBS

## 2020-06-15 ENCOUNTER — HOSPITAL ENCOUNTER (EMERGENCY)
Age: 40
Discharge: HOME | End: 2020-06-15
Attending: EMERGENCY MEDICINE
Payer: COMMERCIAL

## 2020-06-15 VITALS
SYSTOLIC BLOOD PRESSURE: 128 MMHG | WEIGHT: 235.45 LBS | TEMPERATURE: 98.6 F | HEIGHT: 65 IN | OXYGEN SATURATION: 100 % | DIASTOLIC BLOOD PRESSURE: 84 MMHG | BODY MASS INDEX: 39.23 KG/M2 | RESPIRATION RATE: 21 BRPM | HEART RATE: 66 BPM

## 2020-06-15 DIAGNOSIS — R06.02 SOB (SHORTNESS OF BREATH): ICD-10-CM

## 2020-06-15 DIAGNOSIS — R11.0 NAUSEA WITHOUT VOMITING: Primary | ICD-10-CM

## 2020-06-15 LAB
ALBUMIN SERPL-MCNC: 3.4 G/DL (ref 3.5–5)
ALBUMIN/GLOB SERPL: 0.9 {RATIO} (ref 1.1–2.2)
ALP SERPL-CCNC: 87 U/L (ref 45–117)
ALT SERPL-CCNC: 21 U/L (ref 12–78)
ANION GAP SERPL CALC-SCNC: 7 MMOL/L (ref 5–15)
AST SERPL-CCNC: 15 U/L (ref 15–37)
ATRIAL RATE: 77 BPM
BASOPHILS # BLD: 0.1 K/UL (ref 0–0.1)
BASOPHILS NFR BLD: 1 % (ref 0–1)
BILIRUB SERPL-MCNC: 0.4 MG/DL (ref 0.2–1)
BUN SERPL-MCNC: 10 MG/DL (ref 6–20)
BUN/CREAT SERPL: 11 (ref 12–20)
CALCIUM SERPL-MCNC: 8.9 MG/DL (ref 8.5–10.1)
CALCULATED R AXIS, ECG10: 12 DEGREES
CALCULATED T AXIS, ECG11: -2 DEGREES
CHLORIDE SERPL-SCNC: 109 MMOL/L (ref 97–108)
CO2 SERPL-SCNC: 25 MMOL/L (ref 21–32)
COMMENT, HOLDF: NORMAL
CREAT SERPL-MCNC: 0.95 MG/DL (ref 0.55–1.02)
DIAGNOSIS, 93000: NORMAL
DIFFERENTIAL METHOD BLD: NORMAL
EOSINOPHIL # BLD: 0.2 K/UL (ref 0–0.4)
EOSINOPHIL NFR BLD: 3 % (ref 0–7)
ERYTHROCYTE [DISTWIDTH] IN BLOOD BY AUTOMATED COUNT: 14.5 % (ref 11.5–14.5)
GLOBULIN SER CALC-MCNC: 4 G/DL (ref 2–4)
GLUCOSE SERPL-MCNC: 93 MG/DL (ref 65–100)
HCG SERPL QL: NEGATIVE
HCT VFR BLD AUTO: 39.3 % (ref 35–47)
HGB BLD-MCNC: 12.7 G/DL (ref 11.5–16)
IMM GRANULOCYTES # BLD AUTO: 0 K/UL (ref 0–0.04)
IMM GRANULOCYTES NFR BLD AUTO: 0 % (ref 0–0.5)
LYMPHOCYTES # BLD: 3.4 K/UL (ref 0.8–3.5)
LYMPHOCYTES NFR BLD: 42 % (ref 12–49)
MAGNESIUM SERPL-MCNC: 2.1 MG/DL (ref 1.6–2.4)
MCH RBC QN AUTO: 29.3 PG (ref 26–34)
MCHC RBC AUTO-ENTMCNC: 32.3 G/DL (ref 30–36.5)
MCV RBC AUTO: 90.8 FL (ref 80–99)
MONOCYTES # BLD: 0.7 K/UL (ref 0–1)
MONOCYTES NFR BLD: 9 % (ref 5–13)
NEUTS SEG # BLD: 3.6 K/UL (ref 1.8–8)
NEUTS SEG NFR BLD: 45 % (ref 32–75)
NRBC # BLD: 0 K/UL (ref 0–0.01)
NRBC BLD-RTO: 0 PER 100 WBC
P-R INTERVAL, ECG05: 162 MS
PLATELET # BLD AUTO: 364 K/UL (ref 150–400)
PMV BLD AUTO: 9.8 FL (ref 8.9–12.9)
POTASSIUM SERPL-SCNC: 3.9 MMOL/L (ref 3.5–5.1)
PROT SERPL-MCNC: 7.4 G/DL (ref 6.4–8.2)
Q-T INTERVAL, ECG07: 390 MS
QRS DURATION, ECG06: 84 MS
QTC CALCULATION (BEZET), ECG08: 441 MS
RBC # BLD AUTO: 4.33 M/UL (ref 3.8–5.2)
SAMPLES BEING HELD,HOLD: NORMAL
SODIUM SERPL-SCNC: 141 MMOL/L (ref 136–145)
TROPONIN I SERPL-MCNC: <0.05 NG/ML
TSH SERPL DL<=0.05 MIU/L-ACNC: 0.92 UIU/ML (ref 0.36–3.74)
VENTRICULAR RATE, ECG03: 77 BPM
WBC # BLD AUTO: 8 K/UL (ref 3.6–11)

## 2020-06-15 PROCEDURE — 99283 EMERGENCY DEPT VISIT LOW MDM: CPT

## 2020-06-15 PROCEDURE — 84484 ASSAY OF TROPONIN QUANT: CPT

## 2020-06-15 PROCEDURE — 93005 ELECTROCARDIOGRAM TRACING: CPT

## 2020-06-15 PROCEDURE — 36415 COLL VENOUS BLD VENIPUNCTURE: CPT

## 2020-06-15 PROCEDURE — 84703 CHORIONIC GONADOTROPIN ASSAY: CPT

## 2020-06-15 PROCEDURE — 85025 COMPLETE CBC W/AUTO DIFF WBC: CPT

## 2020-06-15 PROCEDURE — 83735 ASSAY OF MAGNESIUM: CPT

## 2020-06-15 PROCEDURE — 80053 COMPREHEN METABOLIC PANEL: CPT

## 2020-06-15 PROCEDURE — 84443 ASSAY THYROID STIM HORMONE: CPT

## 2020-06-15 NOTE — ED PROVIDER NOTES
EMERGENCY DEPARTMENT HISTORY AND PHYSICAL EXAM      Date: 6/15/2020  Patient Name: Tay Tian    History of Presenting Illness     Chief Complaint   Patient presents with    Shortness of Breath     Patient reports sudden onset of nausea and then SOB PTA; denies pain, cough, or fever       History Provided By: Patient    HPI: Tay Tian, 42 y.o. female presents to the ED with history of PCOS, visit to the emergency department in April 2019 for palpitations and shortness of breath here with nausea, jitteriness and shortness of breath shortly after drinking coffee this morning. Patient drinks coffee somewhat irregularly and had it prior to her lazo and eggs. She had a little bit of nausea and jitteriness and followed and then started feeling short of breath. She then had a bowel movement with a small amount of diarrhea but nonbloody. She was worried, and the set of COVID-19 what was going on and so visited the emergency department for further evaluation. She is feeling better now. She was slated to start the keto diet today but is otherwise been eating and drinking fine and reports no illness including cough, fever, vomiting or urinary symptoms. She has been laid off and is at home with her 11and 3year-old and does not know of any exposure to COVID-19. She does not know of any thyroid issues that she has. She stopped taking her PCOS medicine approximately 2 weeks ago. There are no other complaints, changes, or physical findings at this time. PCP: None    No current facility-administered medications on file prior to encounter. Current Outpatient Medications on File Prior to Encounter   Medication Sig Dispense Refill    metFORMIN (GLUCOPHAGE) 1,000 mg tablet Take 2 Tabs by mouth daily.  180 Tab 2       Past History     Past Medical History:  Past Medical History:   Diagnosis Date    Abnormal Pap smear     2002, f/u WNL    Chlamydia     8/13    Diabetes ([facility] [address]     GDM - diet controlled    Genital herpes, unspecified     no outbreaks in 10 years, taking valtrex    GERD (gastroesophageal reflux disease)     Hernia, hiatal 2018    Impaired glucose tolerance     Infertility, female     Took clomid for pregnancy    PCOS (polycystic ovarian syndrome)     Polycystic disease, ovaries     Unspecified vitamin D deficiency        Past Surgical History:  Past Surgical History:   Procedure Laterality Date    HX CHOLECYSTECTOMY  2018     Laparoscopic cholecystectomy with cholangiogram, interpretation of cholangiogram     HX NEPHRECTOMY      patient donated her kidney    HX OTHER SURGICAL      donated Kidney to father     HX OTHER SURGICAL      tonsillectomy    HX POLYPECTOMY  2018    HX TONSILLECTOMY      HX TRANSPLANT      donation of right kidney in  to her father       Family History:  Family History   Problem Relation Age of Onset    Thyroid Disease Mother     Hypertension Father     Diabetes Father     Kidney Disease Father     Asthma Sister     MS Maternal Grandmother     Cancer Paternal Grandmother         endometrial    Diabetes Paternal Grandmother     Hypertension Paternal Grandmother     Heart Disease Paternal Uncle     Heart Attack Paternal Uncle        Social History:  Social History     Tobacco Use    Smoking status: Former Smoker     Packs/day: 0.25     Years: 10.00     Pack years: 2.50     Last attempt to quit: 2013     Years since quittin.4    Smokeless tobacco: Never Used   Substance Use Topics    Alcohol use: Yes     Alcohol/week: 0.8 standard drinks     Types: 1 Standard drinks or equivalent per week     Comment: once a week    Drug use: No     Comment: denies        Allergies:  No Known Allergies      Review of Systems   Review of Systems   Constitutional: Negative for activity change, appetite change, chills, fever and unexpected weight change. HENT: Negative.     Respiratory: Positive for chest tightness. Negative for cough and shortness of breath. Cardiovascular: Negative for chest pain, palpitations and leg swelling. Gastrointestinal: Negative for abdominal distention and abdominal pain. Genitourinary: Negative for difficulty urinating. Musculoskeletal: Negative for back pain. Neurological: Negative for dizziness and light-headedness. All other systems reviewed and are negative. Physical Exam   Physical Exam   Vital signs and nursing notes reviewed    CONSTITUTIONAL: Alert, in mild distress; well-developed; well-nourished. Very well-appearing, pleasant disposition. HEAD:  Normocephalic, atraumatic  EYES: PERRL; EOM's intact. ENTM: Nose: no rhinorrhea; Throat: no erythema or exudate, mucous membranes moist  Neck:  Supple. trachea is midline. RESP: Chest clear, equal breath sounds. - W/R/R, talking in full sentences. CV: S1 and S2 WNL; No murmurs, gallops or rubs. 2+ radial and DP pulses bilaterally. Rate 76 and regular. GI: non-distended, normal bowel sounds, abdomen soft and non-tender. No masses or organomegaly. : No costo-vertebral angle tenderness. BACK:  Non-tender, normal appearance  UPPER EXT:  Normal inspection. no joint or soft tissue swelling  LOWER EXT: No edema, no calf tenderness. NEURO: Alert and oriented x3, 5/5 strength and light touch sensation intact in bilateral upper and lower extremities. SKIN: No rashes;  Warm and dry  PSYCH: Normal mood, normal affect    Diagnostic Study Results     Labs -     Recent Results (from the past 12 hour(s))   EKG, 12 LEAD, INITIAL    Collection Time: 06/15/20 10:03 AM   Result Value Ref Range    Ventricular Rate 77 BPM    Atrial Rate 77 BPM    P-R Interval 162 ms    QRS Duration 84 ms    Q-T Interval 390 ms    QTC Calculation (Bezet) 441 ms    Calculated R Axis 12 degrees    Calculated T Axis -2 degrees    Diagnosis       Normal sinus rhythm  Normal ECG  When compared with ECG of 29-APR-2019 15:28,  No significant change was found     SAMPLES BEING HELD    Collection Time: 06/15/20 10:11 AM   Result Value Ref Range    SAMPLES BEING HELD BL,SST     COMMENT        Add-on orders for these samples will be processed based on acceptable specimen integrity and analyte stability, which may vary by analyte. CBC WITH AUTOMATED DIFF    Collection Time: 06/15/20 10:11 AM   Result Value Ref Range    WBC 8.0 3.6 - 11.0 K/uL    RBC 4.33 3.80 - 5.20 M/uL    HGB 12.7 11.5 - 16.0 g/dL    HCT 39.3 35.0 - 47.0 %    MCV 90.8 80.0 - 99.0 FL    MCH 29.3 26.0 - 34.0 PG    MCHC 32.3 30.0 - 36.5 g/dL    RDW 14.5 11.5 - 14.5 %    PLATELET 321 232 - 115 K/uL    MPV 9.8 8.9 - 12.9 FL    NRBC 0.0 0  WBC    ABSOLUTE NRBC 0.00 0.00 - 0.01 K/uL    NEUTROPHILS 45 32 - 75 %    LYMPHOCYTES 42 12 - 49 %    MONOCYTES 9 5 - 13 %    EOSINOPHILS 3 0 - 7 %    BASOPHILS 1 0 - 1 %    IMMATURE GRANULOCYTES 0 0.0 - 0.5 %    ABS. NEUTROPHILS 3.6 1.8 - 8.0 K/UL    ABS. LYMPHOCYTES 3.4 0.8 - 3.5 K/UL    ABS. MONOCYTES 0.7 0.0 - 1.0 K/UL    ABS. EOSINOPHILS 0.2 0.0 - 0.4 K/UL    ABS. BASOPHILS 0.1 0.0 - 0.1 K/UL    ABS. IMM. GRANS. 0.0 0.00 - 0.04 K/UL    DF AUTOMATED     METABOLIC PANEL, COMPREHENSIVE    Collection Time: 06/15/20 10:11 AM   Result Value Ref Range    Sodium 141 136 - 145 mmol/L    Potassium 3.9 3.5 - 5.1 mmol/L    Chloride 109 (H) 97 - 108 mmol/L    CO2 25 21 - 32 mmol/L    Anion gap 7 5 - 15 mmol/L    Glucose 93 65 - 100 mg/dL    BUN 10 6 - 20 MG/DL    Creatinine 0.95 0.55 - 1.02 MG/DL    BUN/Creatinine ratio 11 (L) 12 - 20      GFR est AA >60 >60 ml/min/1.73m2    GFR est non-AA >60 >60 ml/min/1.73m2    Calcium 8.9 8.5 - 10.1 MG/DL    Bilirubin, total 0.4 0.2 - 1.0 MG/DL    ALT (SGPT) 21 12 - 78 U/L    AST (SGOT) 15 15 - 37 U/L    Alk.  phosphatase 87 45 - 117 U/L    Protein, total 7.4 6.4 - 8.2 g/dL    Albumin 3.4 (L) 3.5 - 5.0 g/dL    Globulin 4.0 2.0 - 4.0 g/dL    A-G Ratio 0.9 (L) 1.1 - 2.2     MAGNESIUM    Collection Time: 06/15/20 10:11 AM   Result Value Ref Range    Magnesium 2.1 1.6 - 2.4 mg/dL   TSH 3RD GENERATION    Collection Time: 06/15/20 10:11 AM   Result Value Ref Range    TSH 0.92 0.36 - 3.74 uIU/mL   TROPONIN I    Collection Time: 06/15/20 10:11 AM   Result Value Ref Range    Troponin-I, Qt. <0.05 <0.05 ng/mL   HCG QL SERUM    Collection Time: 06/15/20 10:11 AM   Result Value Ref Range    HCG, Ql. Negative NEG         Radiologic Studies -   No orders to display     CT Results  (Last 48 hours)    None        CXR Results  (Last 48 hours)    None          Medical Decision Making   I am the first provider for this patient. I reviewed the vital signs, available nursing notes, past medical history, past surgical history, family history and social history. Vital Signs-Reviewed the patient's vital signs. Patient Vitals for the past 12 hrs:   Temp Pulse Resp BP SpO2   06/15/20 0952 98.6 °F (37 °C) 67 18 (!) 132/91 100 %       EKG interpretation: (Preliminary)  EKG performed at 10:03 AM, as interpreted by me shows normal sinus rhythm at a rate of 77, normal axis, normal intervals, no visible acute ischemic changes, isolated T wave inversion in lead III, no S1 Q3 T3. Records Reviewed: Nursing Notes, Old Medical Records, Previous Radiology Studies and Previous Laboratory Studies    Provider Notes (Medical Decision Making):   47-year-old female here with resolved nausea and jitteriness and shortness of breath with normal pulse, not hypoxic or tachypneic with reassuring lung exam and EKG. Will check electrolytes, thyroid function. Do not suspect COVID-19, could be secondary to caffeine ingestion today, not consistent with cardiothoracic emergency, abdomen is nonsurgical does not suggest obstruction. We discharged with reassuring labs. ED Course:   Initial assessment performed. The patients presenting problems have been discussed, and they are in agreement with the care plan formulated and outlined with them.   I have encouraged them to ask questions as they arise throughout their visit. 11:23 AM  Updated patient on reassuring results. Patient feeling better. Plan for discharge. Disposition:  Discharge    Discharge Note:  11:23 AM    The pt is ready for discharge. The pt's signs, symptoms, diagnosis, and discharge instructions have been discussed and pt has conveyed their understanding. The pt is to follow up as recommended or return to ER should their symptoms worsen. Plan has been discussed and pt is in agreement. DISCHARGE PLAN:  1. Current Discharge Medication List        2. Follow-up Information     Follow up With Specialties Details Why Contact Info    [facility] EMERGENCY DEPT Emergency Medicine  As needed including if you pass out, develop chest pain, new shortness of breath or other new concerning symptoms. [address] [place] [address]  [phone]       Please drink plenty of fluids and eat well today before starting your new diet tomorrow should you choose. 3.  Return to ED if worse     Diagnosis     Clinical Impression:   1. Nausea without vomiting    2. SOB (shortness of breath)        Attestations:    Kishore Lane MD    Please note that this dictation was completed with [employer], the computer voice recognition software. Quite often unanticipated grammatical, syntax, homophones, and other interpretive errors are inadvertently transcribed by the computer software. Please disregard these errors. Please excuse any errors that have escaped final proofreading. Thank you.

## 2020-06-15 NOTE — ED NOTES
This Rn and Dr Ana Maria Campbell reviewed discharge instructions with the patient. The patient verbalized understanding. All questions and concerns were addressed. The patient declined a wheelchair and is discharged ambulatory in the care of family members with instructions and prescriptions in hand. Pt is alert and oriented x 4. Respirations are clear and unlabored.

## 2020-06-16 ENCOUNTER — PATIENT OUTREACH (OUTPATIENT)
Dept: CARDIOLOGY CLINIC | Age: 40
End: 2020-06-16

## 2020-06-17 ENCOUNTER — PATIENT OUTREACH (OUTPATIENT)
Dept: CARDIOLOGY CLINIC | Age: 40
End: 2020-06-17

## 2020-06-17 NOTE — PROGRESS NOTES
Patient contacted regarding recent discharge and COVID-19 risk. Discussed COVID-19 related testing which was not done at this time. Test results were not done. Patient informed of results, if available? no    Care Transition Nurse/ Ambulatory Care Manager contacted the patient by telephone to perform post discharge assessment. Verified name and  with patient as identifiers. Patient has following risk factors of: diabetes. CTN/ACM reviewed discharge instructions, medical action plan and red flags related to discharge diagnosis. Reviewed and educated them on any new and changed medications related to discharge diagnosis. Advised obtaining a 90-day supply of all daily and as-needed medications. Education provided regarding infection prevention, and signs and symptoms of COVID-19 and when to seek medical attention with patient who verbalized understanding. Discussed exposure protocols and quarantine from [address] you at higher risk for severe illness  and given an opportunity for questions and concerns. The patient agrees to contact the COVID-19 hotline [phone] or PCP office for questions related to their healthcare. CTN/ACM provided contact information for future reference. From CDC: Are you at higher risk for severe illness?  Wash your hands often.  Avoid close contact (6 feet, which is about two arm lengths) with people who are sick.  Put distance between yourself and other people if COVID-19 is spreading in your community.  Clean and disinfect frequently touched surfaces.  Avoid all cruise travel and non-essential air travel.  Call your healthcare professional if you have concerns about COVID-19 and your underlying condition or if you are sick.     For more information on steps you can take to protect yourself, see CDC's How to Protect Yourself      Patient/family/caregiver given information for Eduardo Jay and agrees to enroll no  Patient's preferred e-mail: n/a  Patient's preferred phone number: n/a  Based on Loop alert triggers, patient will be contacted by nurse care manager for worsening symptoms. Plan for follow-up call in 7-14 days based on severity of symptoms and risk factors.

## 2020-07-01 ENCOUNTER — PATIENT OUTREACH (OUTPATIENT)
Dept: CARDIOLOGY CLINIC | Age: 40
End: 2020-07-01

## 2020-07-01 NOTE — PROGRESS NOTES
Patient resolved from Transition of Care episode on 7/1/20  Discussed COVID-19 related testing which was not done at this time. Test results were not done. Patient informed of results, if available? no     Patient/family has been provided the following resources and education related to COVID-19:                         Signs, symptoms and red flags related to COVID-19            CDC exposure and quarantine guidelines            [employer] exposure contact - [phone]            Contact for their local Department of Health                 Patient currently reports that the following symptoms have improved:  no worsening symptoms. No further outreach scheduled with this CTN/ACM/LPN/HC/ MA. Episode of Care resolved. Patient has this CTN/ACM/LPN/HC/MA contact information if future needs arise.

## 2020-08-26 ENCOUNTER — VIRTUAL VISIT (OUTPATIENT)
Dept: INTERNAL MEDICINE CLINIC | Age: 40
End: 2020-08-26
Payer: COMMERCIAL

## 2020-08-26 DIAGNOSIS — E28.2 PCOS (POLYCYSTIC OVARIAN SYNDROME): ICD-10-CM

## 2020-08-26 DIAGNOSIS — Z86.32 HISTORY OF GESTATIONAL DIABETES: ICD-10-CM

## 2020-08-26 DIAGNOSIS — E74.39 GLUCOSE INTOLERANCE: ICD-10-CM

## 2020-08-26 PROCEDURE — 99213 OFFICE O/P EST LOW 20 MIN: CPT | Performed by: FAMILY MEDICINE

## 2020-08-26 NOTE — PROGRESS NOTES
Meng Joseph is a 42 y.o. female who was seen by synchronous (real-time) audio-video technology on 8/26/2020 for Weight Gain (Patient states she would like to discuss getting the gastric sleeve.)        Assessment & Plan:   Diagnoses and all orders for this visit:    1. BMI 38.0-38.9,adult  -     REFERRAL TO BARIATRIC SURGERY    2. Glucose intolerance-5.5% in 3/2020    3. PCOS (polycystic ovarian syndrome)-compliant with metformin    4. History of gestational diabetes    I have discussed the diagnosis with the patient and the intended plan as seen in the above orders. The patient understands and agrees with the plan. Subjective:   Gastric sleeve interest  BMI on record is 38% in March. PMH of glucose intolerance, gestational DM and PCOS  LOST 20 LB ON KETO  +DIET, EXERCISE     [facility]  [facility] COORD [phone],         Prior to Admission medications    Medication Sig Start Date End Date Taking? Authorizing Provider   metFORMIN (GLUCOPHAGE) 1,000 mg tablet Take 2 Tabs by mouth daily. 4/17/20   Chelo Sy MD     Reviewed PmHx, RxHx, FmHx, SocHx, AllgHx and updated and dated in the chart. ROS  Per hpi    Objective:   No flowsheet data found.      [INSTRUCTIONS:  \"[x]\" Indicates a positive item  \"[]\" Indicates a negative item  -- DELETE ALL ITEMS NOT EXAMINED]    Constitutional: [x] Appears well-developed and well-nourished [x] No apparent distress      [] Abnormal -     Mental status: [x] Alert and awake  [x] Oriented to person/place/time [x] Able to follow commands    [] Abnormal -     Eyes:   EOM    [x]  Normal    [] Abnormal -   Sclera  [x]  Normal    [] Abnormal -          Discharge [x]  None visible   [] Abnormal -     HENT: [x] Normocephalic, atraumatic  [] Abnormal -   [x] Mouth/Throat: Mucous membranes are moist    External Ears [x] Normal  [] Abnormal -    Neck: [x] No visualized mass [] Abnormal -     Pulmonary/Chest: [x] Respiratory effort normal   [x] No visualized signs of difficulty breathing or respiratory distress        [] Abnormal -      Musculoskeletal:   [] Normal gait with no signs of ataxia         [x] Normal range of motion of neck        [] Abnormal -     Neurological:        [x] No Facial Asymmetry (Cranial nerve 7 motor function) (limited exam due to video visit)          [x] No gaze palsy        [] Abnormal -          Skin:        [x] No significant exanthematous lesions or discoloration noted on facial skin         [] Abnormal -            Psychiatric:       [x] Normal Affect [] Abnormal -        [x] No Hallucinations    Other pertinent observable physical exam findings:-        We discussed the expected course, resolution and complications of the diagnosis(es) in detail. Medication risks, benefits, costs, interactions, and alternatives were discussed as indicated. I advised her to contact the office if her condition worsens, changes or fails to improve as anticipated. She expressed understanding with the diagnosis(es) and plan. [address] [place] [address] Mony, who was evaluated through a patient-initiated, synchronous (real-time) audio-video encounter, and/or her healthcare decision maker, is aware that it is a billable service, with coverage as determined by her insurance carrier. She provided verbal consent to proceed: Yes, and patient identification was verified. It was conducted pursuant to the emergency declaration under the [address], [address] [place] [address] [facility] authority and the [employer] and [employer] Act. A caregiver was present when appropriate. Ability to conduct physical exam was limited. I was in the office. The patient was at home.       Bossman Cotter MD

## 2020-08-26 NOTE — PROGRESS NOTES
Chief Complaint   Patient presents with    Weight Gain     Patient states she would like to discuss getting the gastric sleeve. 1. Have you been to the ER, urgent care clinic since your last visit? Hospitalized since your last visit? No    2. Have you seen or consulted any other health care providers outside of the [facility] since your last visit? Include any pap smears or colon screening.  No

## 2020-09-15 ENCOUNTER — VIRTUAL VISIT (OUTPATIENT)
Dept: SURGERY | Age: 40
End: 2020-09-15

## 2020-09-15 VITALS — BODY MASS INDEX: 39.99 KG/M2 | HEIGHT: 65 IN | WEIGHT: 240 LBS

## 2020-09-15 NOTE — PROGRESS NOTES
1. Have you been to the ER, urgent care clinic since your last visit? Hospitalized since your last visit? No    2. Have you seen or consulted any other health care providers outside of the [address] [place] [address] since your last visit? Include any pap smears or colon screening.  No

## 2020-10-13 ENCOUNTER — VIRTUAL VISIT (OUTPATIENT)
Dept: SURGERY | Age: 40
End: 2020-10-13
Payer: COMMERCIAL

## 2020-10-13 VITALS — OXYGEN SATURATION: 98 % | WEIGHT: 244.2 LBS | TEMPERATURE: 98.4 F | BODY MASS INDEX: 40.69 KG/M2 | HEIGHT: 65 IN

## 2020-10-13 DIAGNOSIS — R73.02 IMPAIRED GLUCOSE TOLERANCE: ICD-10-CM

## 2020-10-13 DIAGNOSIS — R06.83 SNORES: ICD-10-CM

## 2020-10-13 DIAGNOSIS — E66.01 MORBID OBESITY (HCC): Primary | ICD-10-CM

## 2020-10-13 DIAGNOSIS — K21.9 GASTROESOPHAGEAL REFLUX DISEASE, UNSPECIFIED WHETHER ESOPHAGITIS PRESENT: ICD-10-CM

## 2020-10-13 PROCEDURE — 99203 OFFICE O/P NEW LOW 30 MIN: CPT | Performed by: NURSE PRACTITIONER

## 2020-10-13 RX ORDER — CETIRIZINE HCL 10 MG
TABLET ORAL
COMMUNITY
End: 2021-07-27

## 2020-10-13 NOTE — PROGRESS NOTES
HISTORY OF PRESENT ILLNESS  I was in the office while conducting this encounter. Consent:  She and/or her healthcare decision maker is aware that this patient-initiated Telehealth encounter is a billable service, with coverage as determined by her insurance carrier. She is aware that she may receive a bill and has provided verbal consent to proceed: Yes    This virtual visit was conducted via [email or web address]. Pursuant to the emergency declaration under the [facility] waiver authority and the [employer] Act, this Virtual  Visit was conducted to reduce the patient's risk of exposure to COVID-19 and provide continuity of care for an established patient. Services were provided through a video synchronous discussion virtually to substitute for in-person clinic visit. Due to this being a TeleHealth evaluation, many elements of the physical examination are unable to be assessed. Total Time: minutes: 35 minutes. [address] is new patient presents today for evaluation for weight loss surgery. Patient has been overweight since 23-23 years old. Her weight has ranged from 222-256 lbs. Her heaviest weight is 256 lbs. Dietary Habits:  Breakfast- coffee, sausage/egg, bagel with cream cheese  Lunch- fast food- salad/meat  Dinner- chicken, peppers, green beans. Snack- does not snack   Liquids- water, sparkling water. Prior weight loss attempts: Mediweight loss, weight watchers, Keto worked the best. She lost 25 lbs. Ms. Mackenzie has a reminder for a \"due or due soon\" health maintenance. I have asked that she contact her primary care provider for follow-up on this health maintenance.       Patient Active Problem List   Diagnosis Code    PCOS (polycystic ovarian syndrome) E28.2    Unspecified vitamin D deficiency E55.9    Impaired glucose tolerance R73.02    H/O kidney donation Z90.5    Pregnancy Z34.90    Gestational diabetes O24.419    Severe obesity ([facility] E66.01       Past Medical History:   Diagnosis Date    Abnormal Pap smear     2002, f/u WNL    Chlamydia     8/13    Diabetes ([facility]     GDM - diet controlled    Genital herpes, unspecified 2002    no outbreaks in 10 years, taking valtrex    GERD (gastroesophageal reflux disease)     Hernia, hiatal 06/18/2018    Impaired glucose tolerance     Infertility, female     Took clomid for pregnancy    PCOS (polycystic ovarian syndrome)     Polycystic disease, ovaries     Unspecified vitamin D deficiency          Past Surgical History:   Procedure Laterality Date    HX CHOLECYSTECTOMY  07/11/2018     Laparoscopic cholecystectomy with cholangiogram, interpretation of cholangiogram     HX NEPHRECTOMY  2003    patient donated her kidney    HX OTHER SURGICAL  2003    donated Kidney to father     HX OTHER SURGICAL  2012    tonsillectomy    HX POLYPECTOMY  06/18/2018    HX TONSILLECTOMY  2012    HX TRANSPLANT      donation of right kidney in 2003 to her father           Kumar Schreiber MD      No Known Allergies      Family History   Problem Relation Age of Onset    Thyroid Disease Mother     Hypertension Father     Diabetes Father     Kidney Disease Father     Asthma Sister     MS Maternal Grandmother     Cancer Paternal Grandmother         endometrial    Diabetes Paternal Grandmother     Hypertension Paternal Grandmother     Heart Disease Paternal Uncle     Heart Attack Paternal Uncle        Social History     Socioeconomic History    Marital status:      Spouse name: Not on file    Number of children: Not on file    Years of education: Not on file    Highest education level: Not on file   Occupational History    Not on file   Social Needs    Financial resource strain: Not on file    Food insecurity     Worry: Not on file     Inability: Not on file    Transportation needs     Medical: Not on file     Non-medical: Not on file   Tobacco Use    Smoking status: Former Smoker     Packs/day: 0.25     Years: 10.00     Pack years: 2.50     Last attempt to quit: 2013     Years since quittin.8    Smokeless tobacco: Never Used   Substance and Sexual Activity    Alcohol use: Yes     Alcohol/week: 0.8 standard drinks     Types: 1 Standard drinks or equivalent per week     Comment: once a week    Drug use: No     Comment: denies     Sexual activity: Yes     Partners: Female     Birth control/protection: None     Comment: single   Lifestyle    Physical activity     Days per week: Not on file     Minutes per session: Not on file    Stress: Not on file   Relationships    Social connections     Talks on phone: Not on file     Gets together: Not on file     Attends Hindu service: Not on file     Active member of club or organization: Not on file     Attends meetings of clubs or organizations: Not on file     Relationship status: Not on file    Intimate partner violence     Fear of current or ex partner: Not on file     Emotionally abused: Not on file     Physically abused: Not on file     Forced sexual activity: Not on file   Other Topics Concern    Not on file   Social History Narrative    Lives in [address] [place] [address],[address] with parents. No children. 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 HPI    Review of Systems   Constitutional: Negative for chills and fever. HENT: Negative for congestion, hearing loss, sinus pain, sore throat and tinnitus. Eyes: Negative for blurred vision, double vision, pain, discharge and redness. Respiratory: Negative for cough, sputum production, shortness of breath and wheezing. Cardiovascular: Negative for chest pain, palpitations and leg swelling. Gastrointestinal: Positive for heartburn. Negative for abdominal pain, constipation, diarrhea, nausea and vomiting. Genitourinary: Negative for dysuria, frequency and urgency.    Musculoskeletal: Negative for back pain, joint pain and neck pain. Skin: Negative for itching and rash. Neurological: Positive for headaches. Negative for dizziness. Psychiatric/Behavioral: Positive for depression. The patient is nervous/anxious and has insomnia (Sleeps 6-7 hours. Wakes up 3-4 times. Has woken herself up snoring. ). Physical Exam  Pulmonary:      Effort: No respiratory distress. Neurological:      Mental Status: She is alert. ASSESSMENT and PLAN      Sleeve gastrectomy or vertical gastric resection involves a resection of the vast majority of the stomach usually done with a dilator pressed against the right half of the stomach of the lesser curvature. One preserves the pyloric sphincter at the lower end of the stomach and then sequentially staples and divides all the way up to the gastroesophageal junction leaving a small rim of the stomach to the left better known as the angle of His. This procedure significantly reduces the amount that the stomach can hold while removing the part of the stomach that secretes the hormone grehlin and alters the speed of food emptying from the stomach. Once food leaves the stomach, the remainder of the intestinal tract is completely intact and there is no effect on the digestion or absorption of food nutrients and calories. The procedure is intermediate between the adjustable gastric band and the gastric bypass as far as weight loss, potential complications and resolution of comorbid diseases such as Type 2 diabetes, high blood pressure, sleep apnea, arthritic pain, cholesterol disorders to mention a few. The usual complications are that of any procedure which affects the ability of the stomach to accept food at any given time. Those are usually nausea and vomiting which either spontaneously resolve or require endoscopic dilatation. The most serious complication from this surgery is a leak from the staple line usually near the  gastroesophageal junction.  The frequency of this serious complication is low and usually heals spontaneously although reoperation may be necessary. The other serious complications are those which can occur with any major surgery and include  Infection, bleeding, blood clots and/or cardiopulmonary problems. Patient meets criteria established by the NIH. Without weight reduction, co-morbidities will escalate as well as risk of early mortality. Recommendation is patient could be served with surgical weight reduction, the procedure of Sleeve Gastrectomy. I explained to the patient differences between laparoscopic and open gastric bypass, laparoscopic adjustable gastric banding, and sleeve gastrectomy procedures. Patient has attended one our informational meeting and has seen our educational materials. Patient desires to have surgery with Dr. Kendall Ahuja. I have reinforced without lifestyle change and behavior modification, they would not achieve his weight loss goals. I reviewed risks and complications associated with each procedure. She will need an UGI. I will also order a sleep study. Other recommendations include psychological evaluation, nutritional consultation. I discussed with patient a diet high in protein, low-fat, low- sugar, limited carbohydrates, and discontinuing use of carbonated beverages. Also discussed physical activity and exercises. I have answered all questions, they wish to proceed. 35 Minutes spent face to face with patient, >50 % of time spent counseling.    ** Copy to PCP and other providers

## 2020-10-13 NOTE — PATIENT INSTRUCTIONS
Learning About Weight-Loss (Bariatric) Surgery What is weight-loss surgery? Bariatric surgery is surgery to help you lose weight. This type of surgery is only used for people who are very overweight and have not been able to lose weight with diet and exercise. This surgery makes the stomach smaller. Some types of surgery also change the connection between your stomach and intestines. Having weight-loss surgery is a big step. After surgery, you'll need to make new, lifelong changes in how you eat and drink. How is weight-loss surgery done? Bariatric surgery may be either \"open\" or \"laparoscopic. \" Open surgery is done through a large cut (incision) in the belly. Laparoscopic surgery is done through several small cuts. The doctor puts a lighted tube, or scope, and other surgical tools through small cuts in your belly. The doctor is able to see your organs with the scope. There are different types of bariatric surgery. Gastric sleeve surgery The surgery is usually done through several small incisions in the belly. The doctor removes more than half of your stomach. This leaves a thin sleeve, or tube, that is about the size of a banana. Because part of your stomach has been removed, this can't be reversed. Poncho-en-Y gastric bypass surgery Poncho-en-Y (say \"yaakov-en-why\") surgery changes the connection between the stomach and the intestines. The doctor separates a section of your stomach from the rest of your stomach. This makes a small pouch. The new pouch will hold the food you eat. The doctor connects the stomach pouch to the middle part of the small intestine. Gastric banding surgery The surgery is usually done through several small incisions in the belly. The doctor wraps a band around the upper part of the stomach. This creates a small pouch. The small size of the pouch means that you will get full after you eat just a small amount of food.  The doctor can inflate or deflate the band to adjust the size. This lets the doctor adjust how quickly food passes from the new pouch into the stomach. It does not change the connection between the stomach and the intestines. What can you expect after the surgery? You may stay in the hospital for one or more days after the surgery. How long you stay depends on the type of surgery you had. Most people need 2 to 4 weeks before they are ready to get back to their usual routine. For the first 2 to 6 weeks after surgery, you probably will need to follow a liquid or soft diet. Bit by bit, you will be able to eat more solid foods. Your doctor may advise you to work with a dietitian. This way you'll be sure to get enough protein, vitamins, and minerals while you are losing weight. Even with a healthy diet, you may need to take vitamin and mineral supplements. After surgery, you will not be able to eat very much at one time. You will get full quickly. Try not to eat too much at one time or eat foods that are high in fat or sugar. If you do, you may vomit, get stomach pain, or have diarrhea. You probably will lose weight very quickly in the first few months after surgery. As time goes on, your weight loss will slow down. You will have regular doctor visits to check how you are doing. Think of bariatric surgery as a tool to help you lose weight. It isn't an instant fix. You will still need to eat a healthy diet and get regular exercise. This will help you reach your weight goal and avoid regaining the weight you lose. Follow-up care is a key part of your treatment and safety. Be sure to make and go to all appointments, and call your doctor if you are having problems. It's also a good idea to know your test results and keep a list of the medicines you take. Where can you learn more? Go to http://www.gray.com/ Enter G469 in the search box to learn more about \"Learning About Weight-Loss (Bariatric) Surgery. \" 
 Current as of: December 11, 2019               Content Version: 12.6 © 8671-2329 [employer]. Care instructions adapted under license by [employer] (which disclaims liability or warranty for this information). If you have questions about a medical condition or this instruction, always ask your healthcare professional. [address] any warranty or liability for your use of this information.

## 2020-10-13 NOTE — PROGRESS NOTES
1. Have you been to the ER, urgent care clinic since your last visit? Hospitalized since your last visit? No    2. Have you seen or consulted any other health care providers outside of the [address] [place] [address] since your last visit? Include any pap smears or colon screening. No    Fabienne [facility]UD [facility]    female  42 y.o. Vitals:    10/13/20 1100   Temp: 98.4 °F (36.9 °C)   TempSrc: Oral   SpO2: 98%   Weight: 244 lb 3.2 oz (110.8 kg)   Height: 5' 5\" (1.651 m)     Body mass index is 40.64 kg/m².

## 2020-10-21 ENCOUNTER — HOSPITAL ENCOUNTER (OUTPATIENT)
Dept: GENERAL RADIOLOGY | Age: 40
Discharge: HOME | End: 2020-10-21
Attending: NURSE PRACTITIONER
Payer: COMMERCIAL

## 2020-10-21 DIAGNOSIS — K21.9 GASTROESOPHAGEAL REFLUX DISEASE, UNSPECIFIED WHETHER ESOPHAGITIS PRESENT: ICD-10-CM

## 2020-10-21 PROCEDURE — 74246 X-RAY XM UPR GI TRC 2CNTRST: CPT

## 2020-11-03 ENCOUNTER — DOCUMENTATION ONLY (OUTPATIENT)
Dept: SLEEP MEDICINE | Age: 40
End: 2020-11-03

## 2020-11-03 ENCOUNTER — VIRTUAL VISIT (OUTPATIENT)
Dept: SLEEP MEDICINE | Age: 40
End: 2020-11-03
Payer: COMMERCIAL

## 2020-11-03 DIAGNOSIS — G47.33 OSA (OBSTRUCTIVE SLEEP APNEA): Primary | ICD-10-CM

## 2020-11-03 DIAGNOSIS — E66.01 MORBID OBESITY WITH BMI OF 40.0-44.9, ADULT (HCC): ICD-10-CM

## 2020-11-03 PROCEDURE — 99204 OFFICE O/P NEW MOD 45 MIN: CPT | Performed by: SPECIALIST

## 2020-11-03 NOTE — PROGRESS NOTES
[address] [place]. [place], [address]  Tel.  [phone]  Fax. [address]  [place], [address]  Tel.  [phone]  Fax. [phone] [address] [facility] [address]   Tel.  [phone]  Fax. [phone]     Silvia Raymond is a 42 y.o. female who was seen by synchronous (real-time) audio-video technology on 11/3/2020. Consent:  She and/or her healthcare decision maker is aware that this patient-initiated Telehealth encounter is a billable service, with coverage as determined by her insurance carrier. She is aware that she may receive a bill and has provided verbal consent to proceed: Yes    I was in the office while conducting this encounter. Chief Complaint       No chief complaint on file. HPI      Silvia Raymond is 42 y.o. female seen for evaluation of a sleep disorder. She has a history of loud snoring. She was told of large tonsils having had a tonsillectomy. Snoring persists. Currently she retires at 6 PM and awakens at 10 AM.  She may awaken 3 times during the night. She describes her self as \"groggy\" on awakening. She notes that she is more fatigued by mid afternoon. She reports frequent left temporal/occipital headache on awakening. She notes that she may easily doze if she is riding as a passenger    The patient has not undergone diagnostic testing for the current problems. [place] Sleepiness Scale: 7  Modified FOSQ: 16    No Known Allergies    Current Outpatient Medications   Medication Sig Dispense Refill    cetirizine (ZYRTEC) 10 mg tablet Take  by mouth daily as needed for Allergies.  metFORMIN (GLUCOPHAGE) 1,000 mg tablet Take 2 Tabs by mouth daily.  180 Tab 2        She  has a past medical history of Abnormal Pap smear, Chlamydia, Diabetes ([address] Genital herpes, unspecified (2002), GERD (gastroesophageal reflux disease), Hernia, hiatal (06/18/2018), Impaired glucose tolerance, Infertility, female, PCOS (polycystic ovarian syndrome), Polycystic disease, ovaries, Snores, and Unspecified vitamin D deficiency. She also has no past medical history of Abuse, Complication of anesthesia, Essential hypertension, Gonorrhea, Herpes gestationis, Herpes simplex without mention of complication, Human immunodeficiency virus (HIV) disease ([address] Kidney disease, Phlebitis and thrombophlebitis of unspecified site, Pituitary disorder ([facility] [address] [facility] Postpartum depression, Psychiatric problem, Rhesus isoimmunization unspecified as to episode of care in pregnancy, Sickle-cell disease, unspecified, Syphilis, Systemic lupus erythematosus ([facility] [address] Thyroid activity decreased, Trauma, Unspecified breast disorder, Unspecified diseases of blood and blood-forming organs, or Unspecified epilepsy without mention of intractable epilepsy. She  has a past surgical history that includes hx transplant; hx other surgical (2003); hx other surgical (2012); hx tonsillectomy (2012); hx nephrectomy (2003); hx polypectomy (06/18/2018); and hx cholecystectomy (07/11/2018). She family history includes Asthma in her sister; Cancer in her paternal grandmother; Diabetes in her father and paternal grandmother; Heart Attack in her paternal uncle; Heart Disease in her paternal uncle; Hypertension in her father and paternal grandmother; Kidney Disease in her father; MS in her maternal grandmother; Thyroid Disease in her mother. She  reports that she quit smoking about 6 years ago. She has a 2.50 pack-year smoking history. She has never used smokeless tobacco. She reports current alcohol use of about 0.8 standard drinks of alcohol per week. She reports that she does not use drugs. Review of Systems:  Review of Systems   Constitutional: Negative for chills and fever. HENT: Negative for hearing loss and tinnitus. Eyes: Negative for blurred vision and double vision. Respiratory: Negative for cough and shortness of breath. Cardiovascular: Negative for chest pain and palpitations. Gastrointestinal: Negative for abdominal pain and heartburn. Genitourinary: Negative for frequency and urgency. Musculoskeletal: Positive for back pain and neck pain. Skin: Negative for itching and rash. Neurological: Positive for headaches. Psychiatric/Behavioral: Negative for depression. The patient is not nervous/anxious. Due to this being a telemedicine evaluation, certain elements of the physical examination are unable to be assessed. Objective:     Weight: 244 lb  BMI: 40.64  General:   Conversant, cooperative   Eyes:  no nystagmus,   Oropharynx:   Mallampati score I,  tongue scalloped,                       Neuro:  Speech fluent, face symmetrical, tongue movement normal   Psych:  Normal affect,  normal countenance        Assessment:       ICD-10-CM ICD-9-CM    1. VAZQUEZ (obstructive sleep apnea)  G47.33 327.23    2. Morbid obesity with BMI of 40.0-44.9, adult ([facility]  E66.01 278.01     Z68.41 V85.41        Potential sleep disordered breathing. She will be evaluated with a home sleep test.  Results to be reviewed with her. Plan:     No orders of the defined types were placed in this encounter. * Patient has a history and examination consistent with the diagnosis of sleep apnea. *Home sleep testing was ordered for initial evaluation. * She was provided information on sleep apnea including corresponding risk factors and the importance of proper treatment. * Treatment options if indicated were reviewed today. Instructions:    o The patient would benefit from weight reduction measures. o Do not engage in activities requiring a normal degree of alertness if fatigue is present.   o The patient understands that untreated or undertreated sleep apnea could impair judgement and the ability to function normally during the day.  o Call or return if symptoms worsen or persist.          Ivette Sim MD, [facility]  Electronically signed 11/03/20     Pursuant to the emergency declaration under the [facility] waiver authority and the [employer] Act, this Virtual  Visit was conducted, with patient's consent, to reduce the patient's risk of exposure to COVID-19 and provide continuity of care for an established patient. Services were provided through a video synchronous discussion virtually to substitute for in-person clinic visit. Jennifer Solorio MD     Visit duration: 13 min    This note was created using voice recognition software. Despite editing, there may be syntax errors. This note will not be viewable in [address]

## 2020-11-03 NOTE — PATIENT INSTRUCTIONS

## 2020-11-18 ENCOUNTER — HOSPITAL ENCOUNTER (OUTPATIENT)
Dept: SLEEP MEDICINE | Age: 40
Discharge: HOME | End: 2020-11-18
Payer: COMMERCIAL

## 2020-11-18 ENCOUNTER — OFFICE VISIT (OUTPATIENT)
Dept: SLEEP MEDICINE | Age: 40
End: 2020-11-18

## 2020-11-18 DIAGNOSIS — G47.33 OSA (OBSTRUCTIVE SLEEP APNEA): Primary | ICD-10-CM

## 2020-11-18 PROCEDURE — 95806 SLEEP STUDY UNATT&RESP EFFT: CPT | Performed by: SPECIALIST

## 2020-11-18 NOTE — PROGRESS NOTES
[address] [facility] [place]. [place], [address]  Tel.  [phone]  Fax. [address] [facility] [address]  Home, [address] [facility]  Tel.  [phone]  Fax. [phone] [address] [facility] [address]   Tel.  [phone]  Fax. [phone]       S>Fabienne Greer is a 42 y.o. female seen today to receive a home sleep testing unit (HST). · Patient was educated on proper hookup and operation of the HST. · Instruction forms and documentation were reviewed and signed. · The patient demonstrated good understanding of the HST.    O>    There were no vitals taken for this visit. A>  No diagnosis found. P>  · General information regarding operations and maintenance of the device was provided. · She was provided information on sleep apnea including coresponding risk factors and the importance of proper treatment. · Follow-up appointment was made to return the HST. She will be contacted once the results have been reviewed. · She was asked to contact our office for any problems regarding her home sleep test study.

## 2020-11-23 ENCOUNTER — TELEPHONE (OUTPATIENT)
Dept: SLEEP MEDICINE | Age: 40
End: 2020-11-23

## 2020-11-30 ENCOUNTER — OFFICE VISIT (OUTPATIENT)
Dept: INTERNAL MEDICINE CLINIC | Age: 40
End: 2020-11-30
Payer: COMMERCIAL

## 2020-11-30 DIAGNOSIS — E28.2 PCOS (POLYCYSTIC OVARIAN SYNDROME): ICD-10-CM

## 2020-11-30 DIAGNOSIS — M25.542 ARTHRALGIA OF BOTH HANDS: Primary | ICD-10-CM

## 2020-11-30 DIAGNOSIS — M25.541 ARTHRALGIA OF BOTH HANDS: Primary | ICD-10-CM

## 2020-11-30 DIAGNOSIS — M25.531 PAIN IN BOTH WRISTS: ICD-10-CM

## 2020-11-30 DIAGNOSIS — M25.532 PAIN IN BOTH WRISTS: ICD-10-CM

## 2020-11-30 DIAGNOSIS — Z91.14 H/O MEDICATION NONCOMPLIANCE: ICD-10-CM

## 2020-11-30 PROCEDURE — 99214 OFFICE O/P EST MOD 30 MIN: CPT | Performed by: FAMILY MEDICINE

## 2020-11-30 PROCEDURE — 73110 X-RAY EXAM OF WRIST: CPT | Performed by: FAMILY MEDICINE

## 2020-11-30 PROCEDURE — 73130 X-RAY EXAM OF HAND: CPT | Performed by: FAMILY MEDICINE

## 2020-11-30 RX ORDER — METHYLPREDNISOLONE 4 MG/1
TABLET ORAL
Qty: 1 DOSE PACK | Refills: 0 | Status: SHIPPED | OUTPATIENT
Start: 2020-11-30 | End: 2021-07-27

## 2020-11-30 NOTE — PROGRESS NOTES
Chief Complaint   Patient presents with    Hand Pain     Patient is here for hand pain. Patient states she has pain in both hands. 1. Have you been to the ER, urgent care clinic since your last visit? Hospitalized since your last visit? No    2. Have you seen or consulted any other health care providers outside of the [address] [place] [address] since your last visit? Include any pap smears or colon screening.  No

## 2020-11-30 NOTE — PROGRESS NOTES
SPORTS MEDICINE AND PRIMARY CARE  Jany Hurst. MD [place]  [address] [address]    Chief Complaint   Patient presents with    Hand Pain     Patient is here for hand pain. Patient states she has pain in both hands. SUBJECTIVE:    [address] is a 42 y.o. female her hand and wrist pain evaluation. Right hand is more involved than the left. Wrist click. Most fingers ache from the DIP joint to fingertips. Pain disrupts sleep. Areas are sore achy and stiff.  +Fatigue. No paresthesia. No fever, malaise or trauma. Onset over the past several weeks. Works as an occupational  , currently unemployed. Remote right hip pain. Cousin has rheumatoid arthritis    Discontinued metformin for PCOS 2 mo again following a  Med batch recall. Contraception-none    LMP  11/12/30    Current Outpatient Medications   Medication Sig Dispense Refill    cetirizine (ZYRTEC) 10 mg tablet Take  by mouth daily as needed for Allergies.  metFORMIN (GLUCOPHAGE) 1,000 mg tablet Take 2 Tabs by mouth daily.  180 Tab 2     Past Medical History:   Diagnosis Date    Abnormal Pap smear     2002, f/u WNL    Chlamydia     8/13    Diabetes ([facility] [address]     GDM - diet controlled    Genital herpes, unspecified 2002    no outbreaks in 10 years, taking valtrex    GERD (gastroesophageal reflux disease)     Hernia, hiatal 06/18/2018    Impaired glucose tolerance     Infertility, female     Took clomid for pregnancy    PCOS (polycystic ovarian syndrome)     Polycystic disease, ovaries     Snores     Unspecified vitamin D deficiency      Past Surgical History:   Procedure Laterality Date    HX CHOLECYSTECTOMY  07/11/2018     Laparoscopic cholecystectomy with cholangiogram, interpretation of cholangiogram     HX NEPHRECTOMY  2003    patient donated her kidney    HX OTHER SURGICAL  2003    donated Kidney to father     HX OTHER SURGICAL  2012    tonsillectomy    HX POLYPECTOMY  06/18/2018    HX TONSILLECTOMY 2012    HX TRANSPLANT      donation of right kidney in  to her father     No Known Allergies    REVIEW OF SYSTEMS:  General: negative for - chills or fever  ENT: negative for - headaches, nasal congestion, tinnitus, hearing loss, vision changes, sore throat  Respiratory: negative for - cough, hemoptysis, shortness of breath or wheezing  Cardiovascular : negative for - chest pain, edema, palpitations or shortness of breath  Gastrointestinal: negative for - abdominal pain, blood in stools, heartburn or nausea/vomiting, diarrhea, constipation  Genito-Urinary: no dysuria, trouble voiding, hematuria or erectile dysfunction  Musculoskeletal: + joint pain, joint stiffness; ,negative for - gait disturbance, joint swelling, muscle aches  Neurological: no TIA or stroke symptoms  Hematologic: no bruises, no bleeding, no swollen glands  Integument: no lumps, mole changes, nail changes or rash  Endocrine:+lethargy; no malaise/ or unexpected weight changes      Social History     Socioeconomic History    Marital status:      Spouse name: Not on file    Number of children: Not on file    Years of education: Not on file    Highest education level: Not on file   Tobacco Use    Smoking status: Former Smoker     Packs/day: 0.25     Years: 10.00     Pack years: 2.50     Last attempt to quit: 2013     Years since quittin.9    Smokeless tobacco: Never Used   Substance and Sexual Activity    Alcohol use: Yes     Alcohol/week: 0.8 standard drinks     Types: 1 Standard drinks or equivalent per week     Comment: once a week    Drug use: No     Comment: denies     Sexual activity: Yes     Partners: Female     Birth control/protection: None     Comment: single   Social History Narrative    Lives in [address] [place] [address] with parents. No children. 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      Family History   Problem Relation Age of Onset    Thyroid Disease Mother    [address] [facility] Hypertension Father     Diabetes Father     Kidney Disease Father     Asthma Sister     MS Maternal Grandmother     Cancer Paternal Grandmother         endometrial    Diabetes Paternal Grandmother     Hypertension Paternal Grandmother     Heart Disease Paternal Uncle     Heart Attack Paternal Uncle        OBJECTIVE:     Visit Vitals  /81   Pulse 92   Temp 98.1 °F (36.7 °C)   Resp 16   Ht 5' 5\" (1.651 m)   Wt 249 lb (112.9 kg)   SpO2 95%   BMI 41.44 kg/m²     CONSTITUTIONAL: well developed, well nourished, no acute distress  EYES: , eom intact  ENMT:moist mucous membranes,  NECK: supple. Thyroid normal  RESPIRATORY: Chest: clear bilaterally  CARDIOVASCULAR: Heart: regular rate and rhythm, pulse 2+   MUSCULOSKELETAL: Extremities: no synovitis  INTEGUMENT: No unusual rashes or suspicious skin lesions noted. Nails appear normal.  NEUROLOGIC: non-focal exam  Negative tinel/phalen  MENTAL STATUS: alert and oriented, appropriate affect       ASSESSMENT:   1. Arthralgia of both hands    2. Pain in both wrists    3. PCOS (polycystic ovarian syndrome)    4. H/O medication noncompliance        I have discussed the diagnosis with the patient and the intended plan as seen in the  orders. The patient understands and agrees with the plan. The patient has   received an after visit summary and questions were answered concerning  future plans  Patient labs and/or xrays were reviewed  Past records were reviewed.     PLAN:  .  Orders Placed This Encounter    XR HAND RT MIN 3 V    XR HAND LT MIN 3 V    XR WRIST LT AP/LAT/OBL MIN 3V    XR WRIST RT AP/LAT/OBL MIN 3V    DELMY, DIRECT, W/REFLEX    RHEUMATOID FACTOR, QT    URIC ACID    SED RATE (ESR)    CBC WITH AUTOMATED DIFF    METABOLIC PANEL, BASIC    methylPREDNISolone (MEDROL DOSEPACK) 4 mg tablet     Counseled regarding diet, exercise and healthy lifestyle        Advised patient to call back or return to office if symptoms worsen/change/persist.  Discussed expected course/resolution/complications of diagnosis in detail with patient. Medication risks/benefits/costs/interactions/alternatives discussed with patient    Aubrey Cook M.D. A total of at least 25 min was spent during this evaluation of which half was spent in counseling and care coordination    This note was created using voice recognition software.   Edits have been made but syntax errors might exist.

## 2020-11-30 NOTE — PATIENT INSTRUCTIONS
Methylprednisolone (By mouth) Methylprednisolone (meth-il-pred-NIS-oh-lone) Treats many diseases and conditions, including problems related to inflammation. This medicine is a corticosteroid. Brand Name(s): Medrol, Medrol Dosepak There may be other brand names for this medicine. When This Medicine Should Not Be Used: This medicine is not right for everyone. Do not use it if you had an allergic reaction to methylprednisolone or if you have a fungus infection. How to Use This Medicine:  
Tablet · Take your medicine as directed. Your dose may need to be changed several times to find what works best for you. · If you are using this medicine for an ongoing illness, your dose may need to be changed occasionally. Some people take this medicine only every other day, which helps to decrease side effects. · Take your medicine in the morning, unless your doctor tells you otherwise. · It is best to take this medicine with food or milk. · Missed dose: Take a dose as soon as you remember. If it is almost time for your next dose, wait until then and take a regular dose. Do not take extra medicine to make up for a missed dose. · Store the medicine in a closed container at room temperature, away from heat, moisture, and direct light. Drugs and Foods to Avoid: Ask your doctor or pharmacist before using any other medicine, including over-the-counter medicines, vitamins, and herbal products. · Some foods and medicines can affect how methylprednisolone works. Tell your doctor if you use any of the following: ¨ Cyclosporine ¨ Phenobarbital, phenytoin ¨ Rifampin ¨ Ketoconazole ¨ Aspirin, especially high doses ¨ Blood thinner, such as warfarin ¨ Diabetes medicine · This medicine may interfere with vaccines. Ask your doctor before you get a flu shot or any other vaccines. Warnings While Using This Medicine: · Tell your doctor if you are pregnant or breastfeeding, or if you have kidney problems, liver disease, heart failure, high blood pressure, osteoporosis, blood clotting problems, or thyroid problems. Also tell your doctor if you have had mental or emotional problems (such as depression) or stomach or bowel problems (such as an ulcer or diverticulitis). · This medicine may cause the following problems: ¨ Mood or behavior changes ¨ Higher blood pressure, retaining water, changes in salt or potassium levels ¨ Cataracts or glaucoma (with long-term use) ¨ Slow growth in children (with long-term use) · Do not stop using this medicine suddenly. Your doctor will need to slowly decrease your dose before you stop it completely. · This medicine could cause you to get infections more easily. Tell your doctor right away if you have an infection or if you are exposed to chickenpox, measles, or other serious infection. Tell your doctor if you had a serious infection in the past, such as tuberculosis, herpes, or Strongyloides (threadworm). · Tell your doctor about any extra stress or anxiety in your life. Your dose might need to be changed for a short time. · Tell any doctor or dentist who treats you that you are using this medicine. · Keep all medicine out of the reach of children. Never share your medicine with anyone. Possible Side Effects While Using This Medicine:  
Call your doctor right away if you notice any of these side effects: · Allergic reaction: Itching or hives, swelling in your face or hands, swelling or tingling in your mouth or throat, chest tightness, trouble breathing · Dark freckles, skin color changes, coldness, weakness, tiredness, nausea, vomiting, weight loss · Depression, unusual thoughts, feelings, or behaviors, trouble sleeping · Fever, chills, cough, sore throat, and body aches · Severe stomach pain, nausea, vomiting, or red or black stools · Skin changes or growths · Swelling in your hands, ankles, or feet, rapid weight gain · Trouble seeing, eye pain, headache If you notice these less serious side effects, talk with your doctor: · Increased appetite · Round, puffy face · Weight gain around your neck, upper back, breast, face, or waist 
If you notice other side effects that you think are caused by this medicine, tell your doctor. Call your doctor for medical advice about side effects. You may report side effects to FDA at 9-117-FDA-4644 © 2017 [address] Information is for End User's use only and may not be sold, redistributed or otherwise used for commercial purposes. The above information is an  only. It is not intended as medical advice for individual conditions or treatments. Talk to your doctor, nurse or pharmacist before following any medical regimen to see if it is safe and effective for you.

## 2020-12-02 LAB
ANA SER QL: NEGATIVE
ANION GAP SERPL CALC-SCNC: 4 MMOL/L (ref 5–15)
BASOPHILS # BLD: 0.1 K/UL (ref 0–0.1)
BASOPHILS NFR BLD: 1 % (ref 0–1)
BUN SERPL-MCNC: 12 MG/DL (ref 6–20)
BUN/CREAT SERPL: 12 (ref 12–20)
CALCIUM SERPL-MCNC: 9.1 MG/DL (ref 8.5–10.1)
CHLORIDE SERPL-SCNC: 108 MMOL/L (ref 97–108)
CO2 SERPL-SCNC: 30 MMOL/L (ref 21–32)
CREAT SERPL-MCNC: 1 MG/DL (ref 0.55–1.02)
DIFFERENTIAL METHOD BLD: NORMAL
EOSINOPHIL # BLD: 0.3 K/UL (ref 0–0.4)
EOSINOPHIL NFR BLD: 2 % (ref 0–7)
ERYTHROCYTE [DISTWIDTH] IN BLOOD BY AUTOMATED COUNT: 14.1 % (ref 11.5–14.5)
ERYTHROCYTE [SEDIMENTATION RATE] IN BLOOD: 16 MM/HR (ref 0–20)
GLUCOSE SERPL-MCNC: 148 MG/DL (ref 65–100)
HCT VFR BLD AUTO: 41.8 % (ref 35–47)
HGB BLD-MCNC: 12.9 G/DL (ref 11.5–16)
IMM GRANULOCYTES # BLD AUTO: 0 K/UL (ref 0–0.04)
IMM GRANULOCYTES NFR BLD AUTO: 0 % (ref 0–0.5)
LYMPHOCYTES # BLD: 3.4 K/UL (ref 0.8–3.5)
LYMPHOCYTES NFR BLD: 33 % (ref 12–49)
MCH RBC QN AUTO: 29.4 PG (ref 26–34)
MCHC RBC AUTO-ENTMCNC: 30.9 G/DL (ref 30–36.5)
MCV RBC AUTO: 95.2 FL (ref 80–99)
MONOCYTES # BLD: 0.7 K/UL (ref 0–1)
MONOCYTES NFR BLD: 7 % (ref 5–13)
NEUTS SEG # BLD: 5.8 K/UL (ref 1.8–8)
NEUTS SEG NFR BLD: 57 % (ref 32–75)
NRBC # BLD: 0 K/UL (ref 0–0.01)
NRBC BLD-RTO: 0 PER 100 WBC
PLATELET # BLD AUTO: 382 K/UL (ref 150–400)
PMV BLD AUTO: 10.1 FL (ref 8.9–12.9)
POTASSIUM SERPL-SCNC: 4.2 MMOL/L (ref 3.5–5.1)
RBC # BLD AUTO: 4.39 M/UL (ref 3.8–5.2)
RHEUMATOID FACT SERPL-ACNC: <10 IU/ML
SODIUM SERPL-SCNC: 142 MMOL/L (ref 136–145)
URATE SERPL-MCNC: 4.9 MG/DL (ref 2.6–6)
WBC # BLD AUTO: 10.3 K/UL (ref 3.6–11)

## 2020-12-03 VITALS
TEMPERATURE: 98.1 F | HEIGHT: 65 IN | SYSTOLIC BLOOD PRESSURE: 118 MMHG | BODY MASS INDEX: 41.48 KG/M2 | WEIGHT: 249 LBS | RESPIRATION RATE: 16 BRPM | OXYGEN SATURATION: 95 % | HEART RATE: 92 BPM | DIASTOLIC BLOOD PRESSURE: 81 MMHG

## 2020-12-03 PROBLEM — M25.532 PAIN IN BOTH WRISTS: Status: ACTIVE | Noted: 2020-12-03

## 2020-12-03 PROBLEM — M25.542 ARTHRALGIA OF BOTH HANDS: Status: ACTIVE | Noted: 2020-12-03

## 2020-12-03 PROBLEM — Z91.14 H/O MEDICATION NONCOMPLIANCE: Status: ACTIVE | Noted: 2020-12-03

## 2020-12-03 PROBLEM — M25.531 PAIN IN BOTH WRISTS: Status: ACTIVE | Noted: 2020-12-03

## 2020-12-03 PROBLEM — M25.541 ARTHRALGIA OF BOTH HANDS: Status: ACTIVE | Noted: 2020-12-03

## 2020-12-07 NOTE — TELEPHONE ENCOUNTER
HSAT demonstrated borderline sleep disordered breathing characterized by an overall AHI of 5.6/h associated with minimal SaO2 of 81%. Snoring during 5.8% of the study. Respiratory events appeared to be grouped suggestive of potential REM-related episodes. Recommendation: Events may respond to weight reduction. APAP and oral appliance are considerations.     LM for patient

## 2020-12-14 ENCOUNTER — VIRTUAL VISIT (OUTPATIENT)
Dept: INTERNAL MEDICINE CLINIC | Age: 40
End: 2020-12-14
Payer: COMMERCIAL

## 2020-12-14 ENCOUNTER — CLINICAL SUPPORT (OUTPATIENT)
Dept: SURGERY | Age: 40
End: 2020-12-14

## 2020-12-14 DIAGNOSIS — M25.532 PAIN IN BOTH WRISTS: Primary | ICD-10-CM

## 2020-12-14 DIAGNOSIS — M25.531 PAIN IN BOTH WRISTS: Primary | ICD-10-CM

## 2020-12-14 DIAGNOSIS — M25.541 ARTHRALGIA OF BOTH HANDS: ICD-10-CM

## 2020-12-14 DIAGNOSIS — M25.542 ARTHRALGIA OF BOTH HANDS: ICD-10-CM

## 2020-12-14 DIAGNOSIS — G47.30 SLEEP APNEA, UNSPECIFIED TYPE: ICD-10-CM

## 2020-12-14 PROCEDURE — 99213 OFFICE O/P EST LOW 20 MIN: CPT | Performed by: FAMILY MEDICINE

## 2020-12-14 NOTE — PROGRESS NOTES
Pre-operative Bariatric Nutrition Evaluation (1 of 6)     Date: 2020   Mindi Malloy M.D. Name: Aide Flores  :  1980  Age:  47  Gender: Female   Type of Surgery: []           Gastric Bypass   [x]           Sleeve Gastrectomy    ASSESSMENT:    Past Medical History:PCOS, sleep apnea     Medications/Supplements:   Prior to Admission medications    Medication Sig Start Date End Date Taking? Authorizing Provider   methylPREDNISolone (MEDROL DOSEPACK) 4 mg tablet Take as directed for hand and wrist pain 20   Maribell Jackson MD   cetirizine (ZYRTEC) 10 mg tablet Take  by mouth daily as needed for Allergies. Provider, Historical   metFORMIN (GLUCOPHAGE) 1,000 mg tablet Take 2 Tabs by mouth daily. 20   Maribell Jackson MD       Food Allergies/Intolerances:none     Anthropometrics:    Ht:65\"   Recent Office Wt: 249#    IBW: 125#    %IBW: 199%     BMI:41    Category: obesity III     Reported wt history: Pt completing pre-op nutrition evaluation for wt loss surgery over the phone d/t social distancing guidelines d/t COVID-19. Reports lowest adult BW of 150# and highest adult BW of 249# at present. Attributes wt gain over the years r/t PCOS and physical inactivity. Has attempted wt loss through various methods with most successful wt loss of 50# with [employer] . Has been unable to maintain long term or significant wt loss and is now seeking approval for weight loss surgery. Pt will need to complete 6 months of supervised weight loss for insurance requirements.      Exercise/Physical Activity:minimal recently d/t COVID-19; previously had a  up until 2020     Reported Diet History:Weight Watchers, Medi Weight Loss, keto diet      24 Hour Diet Recall  Breakfast  Eggs, turkey sausage, toast    Lunch  Salad, sandwich, soup occasional ordering out    Dinner  Chicken, broccoli or peppers and sausage    Snacks  Popcorn; pt reports minimal snacking    Beverages Decaf coffee and water (64 oz/day); minimal soda and juice but does drink carbonated nayak        Environment/Psychosocial/Support:Pt reports good support from spouse. Pt does majority of grocery shopping and pt's wife does majority of cooking. Pt is  with two kids. Pt has a close friend and brother-in-law who have both recently had sleeve gastrectomy. NUTRITION DIAGNOSIS:  1. Food and nutrition related knowledge deficit r/t previous lack of exposure to information evidenced by pt seeking nutrition education specific to sleeve gastrectomy. NUTRITION INTERVENTION:  Pt educated on nutrition recommendations for weight loss surgery, specifically sleeve gastrectomy. Instructed on consuming 3 meals per day starting now. Use the balanced plate method to plan meals, include 3 oz of lean source of protein, 1/2 cup whole grains, unlimited non-starchy vegetables, 1/2 cup fruit and 1 serving of low fat dairy. Utilize handouts listing healthy snack and meal ideas to limit restaurant meals. After surgery measure all meals to 1/2 cup. Each meal will contain a 1/4 cup lean protein and 1/4 cup fruit, non-starchy vegetable or starch (limiting to once per day). Aim for 60 g protein per day. Sip on 48-64 oz of sugar free, calorie free, non-carbonated beverages each day. Do not use a straw. Do not consume beverages 30 minutes before, during or 30 minutes after meals. Read all nutrition labels. Demonstrated and emphasized identifying serving size, total fat, sugar and protein content. Defined low fat as </= 3 g per serving. Discussed lean and extra lean sources of protein. Provided list of low fat cooking methods. Avoid foods with sugar listed in the first 3 ingredients and >/15 g sugar per serving. Excess sugar/fat intake may lead to dumping syndrome. Discussed signs and symptoms of dumping syndrome.      Practice mindful eating habits; take small bites, chew thoroughly, avoid distractions, utilize hunger/fullness scale. Consume meals over 20-30 minutes. Attend Bariatric Support Group and increase physical activity (approved per MD) for long term weight maintenance. NUTRITION MONITORING AND EVALUATION:    The following goals were established with patient;  1. Increase variety of healthy foods. Emailed pt a list of healthy foods/grocery lists. Meal prepping for a few days ahead for the week. 2. Practice moderation and we discussed 90/10 rule. 3. Review nutrition education materials provided. Follow up next month for continued nutrition education. Specific tips and techniques to facilitate compliance with above recommendations were provided and discussed. Nutrition evaluation reveals important lifestyle changes indicated. Goals set and recommendations made. Will continue to assess. If further details are desired please feel free to contact me at [phone]. This phone number was also provided to the patient for any further questions or concerns.            Dayanara Seaman RD

## 2020-12-17 PROBLEM — G47.30 SLEEP APNEA: Status: ACTIVE | Noted: 2020-12-17

## 2020-12-18 NOTE — PROGRESS NOTES
Tiffany Emerson is a 47 y.o. female who was seen by synchronous (real-time) audio-video technology on 12/14/2020 for Follow-up        Assessment & Plan:   Diagnoses and all orders for this visit:    1. Pain in both wrists-consider carpal tunnel syndrome. Rheumatology visit planned. Consider nerve conduction test.    2. Arthralgia of both hands-consider carpal tunnel syndrome    3. Sleep apnea, unspecified type-follow through with plans for CPAP        Advised patient to call back or return to office if symptoms worsen/change/persist.  Discussed expected course/resolution/complications of diagnosis in detail with patient. Medication risks/benefits/costs/interactions/alternatives discussed with patientI have discussed the diagnosis with the patient and the intended plan as seen in the  orders above. The patient understands and agrees with the plan. All questions the patient posed were answered. Patient labs and/or xrays were reviewed  Past records were reviewed. Subjective:   24-year-old established patient for wrist pain follow-up. Patient was treated for right wrist pain at last visit. She had negative x-rays of the hand and wrist.  Blood work was negative for rheumatoid factor DELMY uric acid sed rate. CBC and metabolic panel were normal.  Patient got a good response from oral steroids. Rheumatology appointment is in January. Patient has positive sleep study in the interim and CPAP use is pending. Prior to Admission medications    Medication Sig Start Date End Date Taking? Authorizing Provider   cetirizine (ZYRTEC) 10 mg tablet Take  by mouth daily as needed for Allergies. Yes Provider, Historical   metFORMIN (GLUCOPHAGE) 1,000 mg tablet Take 2 Tabs by mouth daily.  4/17/20  Yes Cl Alcantar MD   methylPREDNISolone (MEDROL DOSEPACK) 4 mg tablet Take as directed for hand and wrist pain 11/30/20   Cl Alcantar MD     Reviewed PmHx, RxHx, FmHx, SocHx, AllgHx and updated and dated in the chart.    Review of Systems   Psychiatric/Behavioral: The patient has insomnia. All other systems reviewed and are negative. Objective:     Patient-Reported Vitals 10/13/2020   Patient-Reported Weight 244.2 pounds   Patient-Reported Height -   Patient-Reported Temperature 98.4   Patient-Reported SpO2 98   Patient-Reported LMP 10/13/2020        [INSTRUCTIONS:  \"[x]\" Indicates a positive item  \"[]\" Indicates a negative item  -- DELETE ALL ITEMS NOT EXAMINED]    Constitutional: [x] Appears well-developed and well-nourished [x] No apparent distress      [] Abnormal -     Mental status: [x] Alert and awake  [x] Oriented to person/place/time [x] Able to follow commands    [] Abnormal -     Eyes:   EOM    [x]  Normal    [] Abnormal -   Sclera  [x]  Normal    [] Abnormal -          Discharge [x]  None visible   [] Abnormal -     HENT: [x] Normocephalic, atraumatic  [] Abnormal -   [x] Mouth/Throat: Mucous membranes are moist    External Ears [x] Normal  [] Abnormal -    Neck: [x] No visualized mass [] Abnormal -     Pulmonary/Chest: [x] Respiratory effort normal   [x] No visualized signs of difficulty breathing or respiratory distress        [] Abnormal -      Musculoskeletal:   [] Normal gait with no signs of ataxia         [x] Normal range of motion of neck        [] Abnormal -     Neurological:        [x] No Facial Asymmetry (Cranial nerve 7 motor function) (limited exam due to video visit)          [x] No gaze palsy        [] Abnormal -          Skin:        [x] No significant exanthematous lesions or discoloration noted on facial skin         [] Abnormal -            Psychiatric:       [x] Normal Affect [] Abnormal -        [x] No Hallucinations    Other pertinent observable physical exam findings:-        We discussed the expected course, resolution and complications of the diagnosis(es) in detail. Medication risks, benefits, costs, interactions, and alternatives were discussed as indicated.   I advised her to contact the office if her condition worsens, changes or fails to improve as anticipated. She expressed understanding with the diagnosis(es) and plan. [address] [place] [address] Mony, who was evaluated through a patient-initiated, synchronous (real-time) audio-video encounter, and/or her healthcare decision maker, is aware that it is a billable service, with coverage as determined by her insurance carrier. She provided verbal consent to proceed: Yes, and patient identification was verified. It was conducted pursuant to the emergency declaration under the [address] [place] [address], [address] [place] [address] authority and the [employer] Act. A caregiver was present when appropriate. Ability to conduct physical exam was limited. I was in the office. The patient was at home.       Eldon Chew MD

## 2021-01-08 ENCOUNTER — DOCUMENTATION ONLY (OUTPATIENT)
Dept: SLEEP MEDICINE | Age: 41
End: 2021-01-08

## 2021-01-08 ENCOUNTER — OFFICE VISIT (OUTPATIENT)
Dept: RHEUMATOLOGY | Age: 41
End: 2021-01-08
Payer: COMMERCIAL

## 2021-01-08 VITALS
WEIGHT: 247.2 LBS | SYSTOLIC BLOOD PRESSURE: 110 MMHG | TEMPERATURE: 99.1 F | DIASTOLIC BLOOD PRESSURE: 77 MMHG | BODY MASS INDEX: 41.14 KG/M2 | HEART RATE: 101 BPM

## 2021-01-08 DIAGNOSIS — B37.0 THRUSH, ORAL: ICD-10-CM

## 2021-01-08 DIAGNOSIS — E55.9 VITAMIN D DEFICIENCY: ICD-10-CM

## 2021-01-08 DIAGNOSIS — M15.9 PRIMARY OSTEOARTHRITIS INVOLVING MULTIPLE JOINTS: Primary | ICD-10-CM

## 2021-01-08 DIAGNOSIS — M65.4 RADIAL STYLOID TENOSYNOVITIS (DE QUERVAIN): ICD-10-CM

## 2021-01-08 PROCEDURE — 99205 OFFICE O/P NEW HI 60 MIN: CPT | Performed by: INTERNAL MEDICINE

## 2021-01-08 RX ORDER — ERGOCALCIFEROL 1.25 MG/1
50000 CAPSULE ORAL
Qty: 12 CAP | Refills: 4 | Status: SHIPPED | OUTPATIENT
Start: 2021-01-08 | End: 2021-07-27

## 2021-01-08 RX ORDER — NYSTATIN 100000 [USP'U]/ML
200000 SUSPENSION ORAL 4 TIMES DAILY
Qty: 60 ML | Refills: 0 | Status: SHIPPED | OUTPATIENT
Start: 2021-01-08 | End: 2021-01-16

## 2021-01-08 NOTE — PROGRESS NOTES
REASON FOR VISIT:    is a 47 y.o. female with history of diet-controlled DM2 and PCOS who is being referred to [facility] at the request of Dr. Abiodun Morocho, regarding a diagnosis of joint pain. HISTORY OF PRESENT ILLNESS      Beginning of November she woke up with some stiffness closing her hand. Persisted thereafter, particularly in the mornings. Right lateral wrist worst. Saw PCP, ordered DELMY, RF, ESR, which were unremarkable (below). About a year ago had pain in the right hip, lasted for months, difficult with walking. Treated with a Medrol dosepack at the time and had one recurrence which gradually resolved over a few months. Took a Medrol dosepack which helped temporarily, but pain returned quickly thereafter. Only rarely takes ibuprofen now when pain is severe. No breathing issues. No rashes. Has a 3yo at home. Had similar pains in the past after youngest was born, when diagnosed with de Quervain's, though now has more prominent ulnar symptoms. REVIEW OF SYSTEMS  A comprehensive review of systems was negative except for that written in the HPI. A 10-point review of systems is per the new patient questionnaire, which has been reviewed extensively and scanned into the electronic medical record for future reference. Review of systems is as above and is otherwise negative. ALLERGIES  Patient has no known allergies. MEDICATIONS  Current Outpatient Medications   Medication Sig    cetirizine (ZYRTEC) 10 mg tablet Take  by mouth daily as needed for Allergies.  metFORMIN (GLUCOPHAGE) 1,000 mg tablet Take 2 Tabs by mouth daily.  methylPREDNISolone (MEDROL DOSEPACK) 4 mg tablet Take as directed for hand and wrist pain     No current facility-administered medications for this visit.         PAST MEDICAL HISTORY  Past Medical History:   Diagnosis Date    Abnormal Pap smear     2002, f/u WNL    Chlamydia     8/13    Diabetes ([facility] [address]     GDM - diet controlled    Genital herpes, unspecified 2002    no outbreaks in 10 years, taking valtrex    GERD (gastroesophageal reflux disease)     Hernia, hiatal 06/18/2018    Impaired glucose tolerance     Infertility, female     Took clomid for pregnancy    PCOS (polycystic ovarian syndrome)     Polycystic disease, ovaries     Snores     Unspecified vitamin D deficiency        FAMILY HISTORY  family history includes Asthma in her sister; Cancer in her paternal grandmother; Diabetes in her father and paternal grandmother; Heart Attack in her paternal uncle; Heart Disease in her paternal uncle; Hypertension in her father and paternal grandmother; Kidney Disease in her father; MS in her maternal grandmother; Thyroid Disease in her mother. Sister has osteoarthritis. SOCIAL HISTORY  She  reports that she quit smoking about 7 years ago. She has a 2.50 pack-year smoking history. She has never used smokeless tobacco. She reports current alcohol use of about 0.8 standard drinks of alcohol per week. She reports that she does not use drugs. Social History     Social History Narrative    Lives in [address] [place] [address],[address] with parents. No children. 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 DATA  Visit Vitals  /77   Pulse (!) 101   Temp 99.1 °F (37.3 °C)   Wt 247 lb 3.2 oz (112.1 kg)   BMI 41.14 kg/m²    Body mass index is 41.14 kg/m². No flowsheet data found. General:  The patient is well developed, well nourished, alert, and in no apparent distress. Eyes: Sclera are anicteric. No conjunctival injection. HEENT:  +Lingual thrush. No oral ulcers. Adequate salivary pooling. No cervical or supraclavicular lymphadenopathy. Lungs:  Clear to auscultation bilaterally, without wheeze or stridor. Normal respiratory effort. Cor:  Regular rate and rhythm. No murmur rub or gallop. Abdomen: Soft, non-tender, without hepatomegaly or masses. Extremities: No calf tenderness or edema.  Warm and well perfused. Skin:  No significant abnormalities. Neuro: Nonfocal  Musculoskeletal:    A comprehensive musculoskeletal exam was performed for all joints of each upper and lower extremity and assessed for swelling, tenderness and range of motion. Results are documented as below:  Early Heberden nodes with tenderness but no synovitis. +Finkelstein on right, also tenderness adjacent to right ulnar styloid. No evidence of synovitis in the small joints of the hands, wrists, shoulders, elbows, hips, knees or ankles. Labs:  20: DELMY direct neg; RF < 10, ESR 16; CBC WNL; BMP WNL    Imagin20 XR wrists: On my review, no erosions or chondrocalcinosis, preserved joint spaces      ASSESSMENT AND PLAN  Ms. Mackenzie is a 47 y.o. female who presents for evaluation of DIP and wrist-predominant arthralgias, and de Quervain tenosynovitis, most likely related to overuse injury and osteoarthritis. Completing inflammatory arthritis workup with CCP; given that she is recently post-prednisone, encouraged her to notify us if joints worsen acutely and we can reevaluate for active synovitis. Reviewed bracing, topical diclofenac gel, hot wax, and arthritis gloves. We will touch base once more in 3 months, but if no e/o inflammatory arthritis at that point, will likely lean on her primary care physician to continue management of osteoarthritis. 1. Primary osteoarthritis involving multiple joints  - Reviewed hot wax, diclofenac gel, arthritis gloves  - CYCLIC CITRUL PEPTIDE AB, IGG; Future    2. Vitamin D deficiency  - ergocalciferol (ERGOCALCIFEROL) 1,250 mcg (50,000 unit) capsule; Take 1 Cap by mouth every seven (7) days. Indications: vitamin D deficiency (high dose therapy)  Dispense: 12 Cap; Refill: 4    3. Thrush, oral  - nystatin (MYCOSTATIN) 100,000 unit/mL suspension; Take 2 mL by mouth four (4) times daily for 8 days. swish and spit.  Complete the entire 8 day course (use whole bottle)  Dispense: 60 mL; Refill: 0    4. Radial styloid tenosynovitis (de quervain)  - Reviewed wrist braces  - CYCLIC CITRUL PEPTIDE AB, IGG; Future    Patient Instructions   1. I don't see obvious inflammation in the joints currently, but it could be better after your last prednisone taper. The pattern of pain in the far knuckles is more suggestive of osteoarthritis though, so for now I'd recommend diclofenac gel (Voltaren) to the knuckles as needed up to 4x/day. 2. Let me know the next time you have a flare of joint swelling and pain, and we could pursue soft tissue imaging with an MRI to get a better sense of where your inflammation/irritation is happening. 3. I do think you have some tendonitis in the wrist, I'd recommend trying a carpal tunnel wrist splint at night while you sleep to give your wrist some support and avoid irritation. 4. Labs from [facility] to round out the autoimmune workup. 5. Start ergocalciferol (vitamin D2) 50,000 units once every 7 days for the next 3 months. 6. Return in 3 months to follow up how you've been doing and determine next steps. Orders Placed This Encounter    CYCLIC CITRUL PEPTIDE AB, IGG    ergocalciferol (ERGOCALCIFEROL) 1,250 mcg (50,000 unit) capsule    nystatin (MYCOSTATIN) 100,000 unit/mL suspension       Medications: I am having [address] [employer]" start on ergocalciferol and nystatin. I am also having her maintain her metFORMIN, cetirizine, and methylPREDNISolone.     Follow up: 3 months    Mayuri Hadley MD    Adult Rheumatology   [facility], [address] [place] [address] Road   Phone [phone]  Fax [phone]

## 2021-01-08 NOTE — PATIENT INSTRUCTIONS
1. I don't see obvious inflammation in the joints currently, but it could be better after your last prednisone taper. The pattern of pain in the far knuckles is more suggestive of osteoarthritis though, so for now I'd recommend diclofenac gel (Voltaren) to the knuckles as needed up to 4x/day. 2. Let me know the next time you have a flare of joint swelling and pain, and we could pursue soft tissue imaging with an MRI to get a better sense of where your inflammation/irritation is happening. 3. I do think you have some tendonitis in the wrist, I'd recommend trying a carpal tunnel wrist splint at night while you sleep to give your wrist some support and avoid irritation. 4. Labs from [facility] to round out the autoimmune workup. 5. Start ergocalciferol (vitamin D2) 50,000 units once every 7 days for the next 3 months. 6. Return in 3 months to follow up how you've been doing and determine next steps.

## 2021-01-13 ENCOUNTER — CLINICAL SUPPORT (OUTPATIENT)
Dept: SURGERY | Age: 41
End: 2021-01-13

## 2021-01-13 DIAGNOSIS — E66.01 MORBID OBESITY (HCC): Primary | ICD-10-CM

## 2021-01-14 NOTE — PROGRESS NOTES
[employer] [facility] at [facility]  Supervised Weight Loss     Date:   2021    Patient's Name: Wayne Hendrickson  : 1980    Insurance:  [employer]               Session: 2 of  6  Surgery: Sleeve Gastrectomy  Surgeon:  Gaston Dowell M.D. Height: 65\"   Reported Weight:    244      Lbs. BMI: 40   Pounds Lost since last month: 5               Pounds Gained since last month: 0    Starting Weight: 249#   Previous Months Weight: 249#  Overall Pounds Lost: 5#  Overall Pounds Gained: 0    Other Pertinent Information: Today's appointment was completed in a virtual setting d/t COVID-[employer]. Today's wt was self-reported. Smoking Status:  none  Alcohol Intake: 1 drink, once per week    I have reviewed with pt the guidelines of the supervised wt loss program.  Pt understands the expectations of some wt loss during the program and that wt gain could delay the process. I have also explained that appointments need to be consecutive and missing an appointment may result in starting over. Pt has received this information in writing. Changes that patient has made since last month include:  Exercising 2 times per week, stopped eating fried foods. Eating Habits and Behaviors  A nutrition lesson specific to vitamins was provided. We discussed the various reasons for needing vitamins and different types and doses. General healthy eating guidelines were also discussed. Pts were instructed that their plate should be made up 1/2 plate coming from non-starchy vegetables, 1/4 coming from lean meat, and 1/4 of their plate coming from carbohydrates, including fruits, starches, or milk. We discussed measuring meals to 1/2 cup total per meal after surgery. Drinking only calorie-free, sugar-free and non-carbonated beverages. We discussed the importance of drinking 64 ounces of fluid per day to prevent dehydration post-operatively.                        Patient's current diet habits include: Pt is eating 3 meals per day. Snack choices include popcorn, fruit, nuts. Pt is eating refined carbohydrate foods (bread, pasta, rice, potatoes) 3 times per week. Pt is eating sweets/desserts not reported. Pt is using baked, grilled, broiled cooking methods. Pt is eating meals prepared outside of the home 1-3 times per week. Pt is drinking water, Crystal Light, juice/smoothies. Pt reports sometimes emotional eating. Physical Activity/Exercise  We talked about the importance of increasing daily physical activity and beginning to develop an exercise regimen/routine. We talked about exercise as being an important part of long term weight loss after surgery. Comments:  During class, I discussed with patient the importance of getting into an exercise routine. Pt is currently line dancing twice per week for activity. Pt has been encouraged to maintain and increase as tolerated. Behavior Modification       We talked about how to eat more mindfully and identify emotional eating triggers. Tips and recommendations for how to make these changes were provided. Pt was encouraged to keep a food journal and record what they were taking in daily. Overall Assessment: Pt demonstrates appropriate lifestyle changes evidenced by reported changes and reported wt loss. Will continue to assess. Patient-Set Goals:   1. Nutrition - reduced processed foods   2. Exercise - increase exercise to 3 times per week  3.  Behavior -continue to establish better coping skills     Ingrid Booth, FREIDA  1/14/2021

## 2021-02-10 ENCOUNTER — CLINICAL SUPPORT (OUTPATIENT)
Dept: SURGERY | Age: 41
End: 2021-02-10

## 2021-02-10 DIAGNOSIS — E66.01 MORBID OBESITY (HCC): Primary | ICD-10-CM

## 2021-02-11 NOTE — PROGRESS NOTES
[facility] at [facility]  Supervised Weight Loss     Date:   2/10/2021    Patient's Name: Steffany Floyd  : 1980    Insurance:  [place]                                      Session: 3 of  6  Surgery: Sleeve Gastrectomy                     Surgeon:  Silvia Kahn M.D.      Height: 65\"                 Reported Weight:    244      Lbs. BMI: 40             Pounds Lost since last month: 0               Pounds Gained since last month: 0     Starting Weight: 249#                       Previous Months Weight: 244#  Overall Pounds Lost: 5#                  Overall Pounds Gained: 0     Other Pertinent Information: Today's appointment was completed in a virtual setting d/t COVID-[employer]. Today's wt was self-reported. Smoking Status:  none  Alcohol Intake: 1 drink, once per week    I have reviewed with pt the guidelines of the supervised wt loss program.  Pt understands the expectations of some wt loss during the program and that wt gain could delay the process. I have also explained that appointments need to be consecutive and missing an appointment may result in starting over. Pt has received this information in writing. Changes that patient has made since last month include:  Decreased carbohydrate intake, managing emotional eating, sipping water instead of gulping. Eating Habits and Behaviors  General healthy eating guidelines were also discussed. Pts were instructed that their plate should be made up 1/2 plate coming from non-starchy vegetables, 1/4 coming from lean meat, and 1/4 of their plate coming from carbohydrates, including fruits, starches, or milk. We discussed measuring meals to 1/2 cup total per meal after surgery. Drinking only calorie-free, sugar-free and non-carbonated beverages. We discussed the importance of drinking 64 ounces of fluid per day to prevent dehydration post-operatively.                       Physical Activity/Exercise  We talked about the importance of increasing daily physical activity and beginning to develop an exercise regimen/routine. We talked about exercise as being an important part of long term weight loss after surgery. Comments:  During class, I discussed with patient the importance of getting into an exercise routine. Behavior Modification       A behavior modification lesson was provided with an emphasis on developing mindful eating behaviors. We talked about how to eat more mindfully and identify emotional eating triggers. Tips and recommendations for how to make these changes were provided. Pt was encouraged to keep a food journal and record what they were taking in daily. Patient's reported eating behaviors: sometimes emotional eating and overeating as biggest behavior barriers. We discussed tips and strategies for implementing mindful eating. Overall Assessment: Pt demonstrates appropriate lifestyle changes evidenced by reported changes and reported wt loss overall. Will continue to assess. Patient-Set Goals:   1. Nutrition - more protein   2. Exercise - exercise 3 times per week  3.  Behavior -continue to reduce emotional eating    Tiffany Arreaga, FREIDA  2/11/2021

## 2021-03-02 ENCOUNTER — IMMUNIZATION (OUTPATIENT)
Dept: INTERNAL MEDICINE CLINIC | Age: 41
End: 2021-03-02
Payer: COMMERCIAL

## 2021-03-02 DIAGNOSIS — Z23 ENCOUNTER FOR IMMUNIZATION: Primary | ICD-10-CM

## 2021-03-02 PROCEDURE — 0001A COVID-19, MRNA, LNP-S, PF, 30MCG/0.3ML DOSE(PFIZER): CPT | Performed by: FAMILY MEDICINE

## 2021-03-02 PROCEDURE — 91300 COVID-19, MRNA, LNP-S, PF, 30MCG/0.3ML DOSE(PFIZER): CPT | Performed by: FAMILY MEDICINE

## 2021-03-09 ENCOUNTER — CLINICAL SUPPORT (OUTPATIENT)
Dept: SURGERY | Age: 41
End: 2021-03-09

## 2021-03-09 DIAGNOSIS — E66.01 MORBID OBESITY (HCC): Primary | ICD-10-CM

## 2021-03-11 NOTE — PROGRESS NOTES
[facility] at [facility]  Supervised Weight Loss     Date:   3/9/2021    Patient's Name: Argenis Guaman  : 1980    Insurance:  [employer]                                      Session: 4 of  6  Surgery: Sleeve Gastrectomy                     Surgeon: Maureen Rojas M.D.      Height: 65\"                 Reported Weight:    244      Lbs.                               BMI: 19             Pounds Lost since last month: 0               Pounds Gained since last month: 0     Starting Weight: 249#                       Previous Months Weight: 244#  Overall Pounds Lost: 5#                  Overall Pounds Gained: 0     Other Pertinent Information: Today's appointment was completed in a virtual setting d/t COVID-19. Today's wt was self-reported. Smoking Status:  none  Alcohol Intake: not reported    I have reviewed with pt the guidelines of the supervised wt loss program.  Pt understands the expectations of some wt loss during the program and that wt gain could delay the process. I have also explained that appointments need to be consecutive and missing an appointment may result in starting over. Pt has received this information in writing. Changes that patient has made since last month include: Attention to portion sizes, label reading, not drinking with meals. Eating Habits and Behaviors  General healthy eating guidelines were discussed. A nutrition lesson was presented on portion control. Patients were instructed implement portion control now using the balanced plate method (1/2 plate non-starchy vegetables, 1/4 plate lean meat, and 1/4 plate whole grains and to include fruit and/or milk at meals or snack). We discussed measuring meals to 1/2 cup total per meal after surgery and appropriate portion progression long term. Patient's current diet habits include: Pt is eating 3 meals per day. Snack choices include nuts, popcorn and fruit.  Pt is eating refined carbohydrate foods (bread, pasta, rice, potatoes) once a day. Pt is eating sweets/desserts none. Pt is using baked, grilled, broiled cooking methods. Pt is eating meals prepared outside of the home 1-3 times per week. Pt is drinking water and caffeine. Pt reports no to emotional eating. Physical Activity/Exercise  We talked about the importance of increasing daily physical activity and beginning to develop an exercise regimen/routine. We talked about exercise as being an important part of long term weight loss after surgery. Comments:  During class, I discussed with patient the importance of getting into an exercise routine. Pt is currently not exercising stating \"tiredness\" for lack of activity. Pt has been encouraged to consider short segments of activity spaced throughout the day. Behavior Modification       We talked about how to eat more mindfully. Tips and recommendations for how to make these changes were provided. Pt was encouraged to keep a food journal and record what they were taking in daily. Overall Assessment: Pt demonstrates appropriate lifestyle changes evidenced by reported changes and weight maintenance from previous month's reported wt. Will continue to assess. Patient-Set Goals:   1. Nutrition - sipping water instead of gulping  2. Exercise - start back with 3 days per week  3.  Behavior -minimize emotional eating    Galen Tillman, FREIDA  3/11/2021

## 2021-03-23 ENCOUNTER — IMMUNIZATION (OUTPATIENT)
Dept: INTERNAL MEDICINE CLINIC | Age: 41
End: 2021-03-23
Payer: COMMERCIAL

## 2021-03-23 DIAGNOSIS — Z23 ENCOUNTER FOR IMMUNIZATION: Primary | ICD-10-CM

## 2021-03-23 PROCEDURE — 0002A COVID-19, MRNA, LNP-S, PF, 30MCG/0.3ML DOSE(PFIZER): CPT | Performed by: FAMILY MEDICINE

## 2021-03-23 PROCEDURE — 91300 COVID-19, MRNA, LNP-S, PF, 30MCG/0.3ML DOSE(PFIZER): CPT | Performed by: FAMILY MEDICINE

## 2021-03-25 ENCOUNTER — VIRTUAL VISIT (OUTPATIENT)
Dept: SLEEP MEDICINE | Age: 41
End: 2021-03-25
Payer: COMMERCIAL

## 2021-03-25 VITALS — BODY MASS INDEX: 40.65 KG/M2 | WEIGHT: 244 LBS | HEIGHT: 65 IN

## 2021-03-25 DIAGNOSIS — G47.33 OSA (OBSTRUCTIVE SLEEP APNEA): Primary | ICD-10-CM

## 2021-03-25 PROCEDURE — 99212 OFFICE O/P EST SF 10 MIN: CPT | Performed by: SPECIALIST

## 2021-03-25 NOTE — PROGRESS NOTES
[address] [facility]. [place], [address]  Tel.  [phone]  Fax. 100 [facility] 60  [place], [address] [facility]  Tel.  [phone]  Fax. [phone] [address] [facility] Carlo Torres   Tel.  [phone]  Fax. [phone]       Alexis Degroot is a 47 y.o. female who was seen by synchronous (real-time) audio-video technology on 3/25/2021. Consent:  She and/or her healthcare decision maker is aware that this patient-initiated Telehealth encounter is a billable service, with coverage as determined by her insurance carrier. She is aware that she may receive a bill and has provided verbal consent to proceed: Yes    I was in the office while conducting this encounter. Chief Complaint       Chief Complaint   Patient presents with    Sleep Problem     1ST ADH         HPI        Alexis Degroot is a 47 y.o. female seen for follow-up. She was evaluated with a sleep study which demonstrated borderline sleep disordered breathing characterized by an overall AHI of 5.6/h associated with minimal SaO2 of 81%. Snoring during 5.8% of the study. Respiratory events appeared to be grouped suggestive of potential REMrelated episodes. Compliance data downloaded and reviewed in detail with the patient today. During the past 30 days, APAP used during 4 days with the average daily use of 4.7 hours. CMS compliance criteria 7%. AHI 2.9 per hour. No Known Allergies    Current Outpatient Medications   Medication Sig Dispense Refill    metFORMIN (GLUCOPHAGE) 1,000 mg tablet Take 2 Tabs by mouth daily. 180 Tab 2    ergocalciferol (ERGOCALCIFEROL) 1,250 mcg (50,000 unit) capsule Take 1 Cap by mouth every seven (7) days.  Indications: vitamin D deficiency (high dose therapy) 12 Cap 4    methylPREDNISolone (MEDROL DOSEPACK) 4 mg tablet Take as directed for hand and wrist pain 1 Dose Pack 0    cetirizine (ZYRTEC) 10 mg tablet Take  by mouth daily as needed for Allergies. She  has a past medical history of Abnormal Pap smear, Chlamydia, Diabetes ([address] Genital herpes, unspecified (2002), GERD (gastroesophageal reflux disease), Hernia, hiatal (06/18/2018), Impaired glucose tolerance, Infertility, female, PCOS (polycystic ovarian syndrome), Polycystic disease, ovaries, Snores, and Unspecified vitamin D deficiency. She also has no past medical history of Abuse, Complication of anesthesia, Essential hypertension, Gonorrhea, Herpes gestationis, Herpes simplex without mention of complication, Human immunodeficiency virus (HIV) disease ([address] Kidney disease, Phlebitis and thrombophlebitis of unspecified site, Pituitary disorder ([address] Postpartum depression, Psychiatric problem, Rhesus isoimmunization unspecified as to episode of care in pregnancy, Sickle-cell disease, unspecified, Syphilis, Systemic lupus erythematosus ([address] Thyroid activity decreased, Trauma, Unspecified breast disorder, Unspecified diseases of blood and blood-forming organs, or Unspecified epilepsy without mention of intractable epilepsy. She  has a past surgical history that includes hx transplant; hx other surgical (2003); hx other surgical (2012); hx tonsillectomy (2012); hx nephrectomy (2003); hx polypectomy (06/18/2018); and hx cholecystectomy (07/11/2018). She family history includes Asthma in her sister; Cancer in her paternal grandmother; Diabetes in her father and paternal grandmother; Heart Attack in her paternal uncle; Heart Disease in her paternal uncle; Hypertension in her father and paternal grandmother; Kidney Disease in her father; MS in her maternal grandmother; Thyroid Disease in her mother. She  reports that she quit smoking about 7 years ago. She has a 2.50 pack-year smoking history. She has never used smokeless tobacco. She reports current alcohol use of about 0.8 standard drinks of alcohol per week. She reports that she does not use drugs.      Review of Systems:  Unchanged per patient    Due to this being a telemedicine evaluation, certain elements of the physical examination are unable to be assessed. Objective:     Visit Vitals  Ht 5' 5\" (1.651 m)   Wt 244 lb (110.7 kg)   BMI 40.60 kg/m²     Body mass index is 40.6 kg/m². Oropharynx:   Mallampati score I, tongue scalloped            Chest/Lungs:    CVS:         Neuro:  Speech fluent, face symmetrical             Assessment:       ICD-10-CM ICD-9-CM    1. VAZQUEZ (obstructive sleep apnea)  G47.33 327.23        She will use PAP nightly. PAP continues to benefit patient and remains necessary for control of her sleep apnea. Follow-up appointment  In 2 months. Plan:   No orders of the defined types were placed in this encounter. *A copy of compliance data was reviewed in detail. *CPAP will be  continued at the above pressure settings. The patient is to contact the office if there are problems with either mask or pressure settings. Follow-up will be scheduled at which time compliance data will be reviewed. * Patient has a history and examination consistent with the diagnosis of sleep apnea. * She was provided information on sleep apnea including corresponding risk factors and the importance of proper treatment. * Treatment options if indicated were reviewed today. Judi Gallegos MD, [facility]  Electronically signed 03/25/21    Pursuant to the emergency declaration under the [facility], [facility] waiver authority and the [employer] and [employer] Act, this Virtual  Visit was conducted, with patient's consent, to reduce the patient's risk of exposure to COVID-19 and provide continuity of care for an established patient. Services were provided through a video synchronous discussion virtually to substitute for in-person clinic visit. Renate Abad MD       This note was created using voice recognition software. Despite editing, there may be syntax errors. This note will not be viewable in [address] Greater than 15 minutes spent: in direct video patient care, chart review and planning.

## 2021-04-13 ENCOUNTER — CLINICAL SUPPORT (OUTPATIENT)
Dept: SURGERY | Age: 41
End: 2021-04-13

## 2021-04-13 DIAGNOSIS — E66.01 MORBID OBESITY (HCC): Primary | ICD-10-CM

## 2021-04-15 NOTE — PROGRESS NOTES
[facility]  Supervised Weight Loss     Date:   2021    Patient's Name: Judith Patel  : 1980    Insurance:  [employer]                                      Session: 5 of    Surgery: Sleeve Gastrectomy                     Surgeon: Vandana Avendano M.D.      Height: 65\"                 Reported Weight:    244      Lbs.                               BMI: 70             Pounds Lost since last month: 0               Pounds Gained since last month: 0     Starting Weight: 249#                       Previous Months Weight: 244#  Overall Pounds Lost: 5#                  Overall Pounds Gained: 0     Other Pertinent Information: Today's appointment was completed in a virtual setting d/t COVID-[employer]Leaf. Today's wt was self-reported. Smoking Status:  none  Alcohol Intake: 2 drinks, 0-1 times per week    I have reviewed with pt the guidelines of the supervised wt loss program.  Pt understands the expectations of some wt loss during the program and that wt gain could delay the process. I have also explained that appointments need to be consecutive and missing an appointment may result in starting over. Pt has received this information in writing. Changes that patient has made since last month include:  Sipping water, reading labels, more fruits and vegetables. Eating Habits and Behaviors  General healthy eating guidelines were also discussed. Pts were instructed that their plate should be made up 1/2 plate coming from non-starchy vegetables, 1/4 coming from lean meat, and 1/4 of their plate coming from carbohydrates, including fruits, starches, or milk. We discussed measuring meals to 1/2 cup total per meal after surgery. Drinking only calorie-free, sugar-free and non-carbonated beverages. We discussed the importance of drinking 64 ounces of fluid per day to prevent dehydration post-operatively.                        Patient's current diet habits include: Pt is eating 3 meals per day. Snack choices include popcorn, fruits, vegetables. Pt is eating refined carbohydrate foods (bread, pasta, rice, potatoes) once every 2 weeks. Pt is eating sweets/desserts not reported. Pt is using baked, grilled, broiled cooking methods. Pt is eating meals prepared outside of the home 1-3 times per week. Pt is drinking water, caffeine. Pt reports no to emotional eating. Physical Activity/Exercise  An exercise presentation was provided including information about exercise programs available both before and after surgery. We talked about the importance of increasing daily physical activity and beginning to develop an exercise regimen/routine. We talked about exercise as being an important part of long term weight loss after surgery. Comments:  During class, I discussed with patient the importance of getting into an exercise routine. Pt is currently walking 30 minutes 1-2 times per week for activity. Pt has been encouraged to maintain and increase as tolerated. Behavior Modification       We talked about how to eat more mindfully. Tips and recommendations for how to make these changes were provided. Pt was encouraged to keep a food journal and record what they were taking in daily. Overall Assessment: Pt demonstrates appropriate lifestyle changes evidenced by reported changes. Will continue to assess. Patient-Set Goals:   1. Nutrition - eat smaller meals   2. Exercise - exercise 3 times per week  3.  Behavior -avoid emotional eating     Camelia Pool RD  4/15/2021

## 2021-05-11 ENCOUNTER — OFFICE VISIT (OUTPATIENT)
Dept: SURGERY | Age: 41
End: 2021-05-11

## 2021-05-11 DIAGNOSIS — E66.01 MORBID OBESITY (HCC): Primary | ICD-10-CM

## 2021-06-01 ENCOUNTER — OFFICE VISIT (OUTPATIENT)
Dept: SURGERY | Age: 41
End: 2021-06-01
Payer: COMMERCIAL

## 2021-06-01 VITALS
RESPIRATION RATE: 18 BRPM | HEIGHT: 66 IN | TEMPERATURE: 99.1 F | WEIGHT: 244.8 LBS | SYSTOLIC BLOOD PRESSURE: 120 MMHG | OXYGEN SATURATION: 97 % | HEART RATE: 86 BPM | BODY MASS INDEX: 39.34 KG/M2 | DIASTOLIC BLOOD PRESSURE: 75 MMHG

## 2021-06-01 DIAGNOSIS — E66.01 MORBID OBESITY (HCC): ICD-10-CM

## 2021-06-01 PROCEDURE — 99215 OFFICE O/P EST HI 40 MIN: CPT | Performed by: SURGERY

## 2021-06-01 RX ORDER — CLINDAMYCIN HYDROCHLORIDE 300 MG/1
300 CAPSULE ORAL 3 TIMES DAILY
COMMUNITY
End: 2021-07-27

## 2021-06-01 NOTE — PROGRESS NOTES
1. Have you been to the ER, urgent care clinic since your last visit? Hospitalized since your last visit? No    2. Have you seen or consulted any other health care providers outside of the [address] [place] [address] since your last visit? Include any pap smears or colon screening.  No

## 2021-06-01 NOTE — LETTER
6/4/2021    Patient: Barby Bravo   YOB: 1980   Date of Visit: 6/1/2021     Jeanie Guo MD  [place] [address]  [address] [employer]    Dear Jeanie Guo MD,      Thank you for referring Ms. Fabienne Greer to [facility] for evaluation. My notes for this consultation are attached. If you have questions, please do not hesitate to call me. I look forward to following your patient along with you.       Sincerely,    She Stern MD

## 2021-06-04 NOTE — PROGRESS NOTES
Bariatric Surgery Consult    [address] [place] [address] Mony is a 47 y.o. female with a history of morbid obesity. Her Height: 5' 5.5\" (166.4 cm), Weight: 244 lb 12.8 oz (111 kg). Body mass index is 40.12 kg/m². She reports that she has been trying to lose weight for 5 years. Her maximum weight was 250 pounds. She has attended our bariatric surgery information seminar. Fabienne PERSAUD wants to consider laparoscopic sleeve gastrectomy. Pt is referred by:  Cleveland Mathews MD.        Comorbidities:     Bariatric comorbidities present: hypertension, non-insulin dependent diabetes, osteoarthritis and obstructive sleep apnea    Ambulatory status: independent    The patient's reported level of exercise: moderately active.       Patient Active Problem List    Diagnosis Date Noted    Sleep apnea 12/17/2020    Arthralgia of both hands 12/03/2020    Pain in both wrists 12/03/2020    H/O medication noncompliance 12/03/2020    Severe obesity ([address] 03/12/2020    Gestational diabetes 09/05/2014    Pregnancy 09/02/2014    H/O kidney donation 09/10/2012    Unspecified vitamin D deficiency     Impaired glucose tolerance     PCOS (polycystic ovarian syndrome) 06/22/2011     Past Medical History:   Diagnosis Date    Abnormal Pap smear     2002, f/u WNL    Chlamydia     8/13    Diabetes ([address]     GDM - diet controlled    Genital herpes, unspecified 2002    no outbreaks in 10 years, taking valtrex    GERD (gastroesophageal reflux disease)     Hernia, hiatal 06/18/2018    Impaired glucose tolerance     Infertility, female     Took clomid for pregnancy    PCOS (polycystic ovarian syndrome)     Polycystic disease, ovaries     Snores     Unspecified vitamin D deficiency       Past Surgical History:   Procedure Laterality Date    HX CHOLECYSTECTOMY  07/11/2018     Laparoscopic cholecystectomy with cholangiogram, interpretation of cholangiogram     HX NEPHRECTOMY  2003    patient donated her kidney    HX OTHER SURGICAL  2003 donated Kidney to father    Berna De La Vega OTHER SURGICAL  2012    tonsillectomy    HX POLYPECTOMY  2018    HX TONSILLECTOMY  2012    HX TRANSPLANT      donation of right kidney in  to her father      Social History     Tobacco Use    Smoking status: Former Smoker     Packs/day: 0.25     Years: 10.00     Pack years: 2.50     Quit date: 2013     Years since quittin.4    Smokeless tobacco: Never Used   Substance Use Topics    Alcohol use: Yes     Alcohol/week: 0.8 standard drinks     Types: 1 Standard drinks or equivalent per week     Comment: once a week      Family History   Problem Relation Age of Onset    Thyroid Disease Mother     Hypertension Father     Diabetes Father     Kidney Disease Father     Asthma Sister     MS Maternal Grandmother     Cancer Paternal Grandmother         endometrial    Diabetes Paternal Grandmother     Hypertension Paternal Grandmother     Heart Disease Paternal Uncle     Heart Attack Paternal Uncle       . Current Outpatient Medications   Medication Sig    clindamycin (CLEOCIN) 300 mg capsule Take 300 mg by mouth three (3) times daily.  ergocalciferol (ERGOCALCIFEROL) 1,250 mcg (50,000 unit) capsule Take 1 Cap by mouth every seven (7) days. Indications: vitamin D deficiency (high dose therapy)    cetirizine (ZYRTEC) 10 mg tablet Take  by mouth daily as needed for Allergies.  metFORMIN (GLUCOPHAGE) 1,000 mg tablet Take 2 Tabs by mouth daily.  methylPREDNISolone (MEDROL DOSEPACK) 4 mg tablet Take as directed for hand and wrist pain (Patient not taking: Reported on 2021)     No current facility-administered medications for this visit.       No Known Allergies      Review of Systems:    Constitutional: negative  Ears, Nose, Mouth, Throat, and Face: negative  Respiratory: negative  Cardiovascular: negative  Gastrointestinal: negative  Genitourinary:negative  Integument/Breast: negative  Hematologic/Lymphatic: negative  Musculoskeletal:negative  Neurological: negative  Behavioral/Psychiatric: negative    Objective:     Visit Vitals  /75 (BP 1 Location: Right arm, BP Patient Position: Sitting, BP Cuff Size: Adult)   Pulse 86   Temp 99.1 °F (37.3 °C) (Oral)   Resp 18   Ht 5' 5.5\" (1.664 m)   Wt 244 lb 12.8 oz (111 kg)   SpO2 97%   BMI 40.12 kg/m²        Physical Exam:    General:  alert, no distress, morbidly obese   Eyes:  conjunctivae and sclerae normal, pupils equal, round, reactive to light, extraocular movements intact without nystagmus   Throat & Neck: no erythema or exudates noted and neck supple and symmetrical; no palpable masses   Lungs:   clear to auscultation bilaterally   Heart:  Regular rate and rhythm   Abdomen:   obese, soft, nontender, nondistended, no masses or organomegaly,    Extremities: no edema,  no gait disturbances   Skin: Normal.     Upper GI-Preliminary examination, KUB show evidence of prior cholecystectomy and right  nephrectomy, prominent fecal stasis. Double contrast upper GI is performed, 19 images were obtained, fluoroscopy time  is 1.5 minutes. The swallowing mechanism is intact. Swallowing of the barium tablet did not show  any stasis. Stomach and duodenum appear unremarkable no masses, obstruction or  ulceration.     IMPRESSION  IMPRESSION: Negative examination. Assessment:     1. Morbid obesity (Body mass index is 40.12 kg/m².) with multiple comorbidities. The patient meets criteria established by the NIH for weight loss surgery candidates. Without weight reduction, co-morbidities will escalate as well as increase risk of early mortality. Our recommendation is the patient could be served with laparoscopic sleeve gastrectomy. I explained to the patient differences between laparoscopic gastric bypass, laparoscopic adjustable gastric banding, and laparoscopic vertical sleeve gastrectomy with respect to expected weight loss, resolution of comorbidities and risks.  Ms. Ohio has attended one our informational meetings and has seen our educational materials. She has requested Dr. Iam Gutierrez to perform her procedure. I reviewed the role for this procedure as a tool to help her achieve her weight loss goals. I reminded her that effective weight loss comes from lifelong adherence to changes in dietary choices, eating habits and exercise. Recommendation: We will request approval for laparoscopic sleeve gastrectomy. She appears to be a good candidate for laparoscopic sleeve gastrectomy. She has no GERD and her upper GI is normal.  She is otherwise gone through the approval process and looks good for surgery. We will submit for insurance approval.    Signed By: Sabas Cavanaugh MD     June 4, 2021       Greater than half of the time: 45 minutes was used in counciling the patient about bariatric surgery and the steps she needs to take to move forward with her surgery. Ms. Mackenzie has a reminder for a \"due or due soon\" health maintenance. I have asked that she contact her primary care provider for follow-up on this health maintenance.

## 2021-07-27 ENCOUNTER — HOSPITAL ENCOUNTER (OUTPATIENT)
Dept: PREADMISSION TESTING | Age: 41
Discharge: HOME | End: 2021-07-27
Payer: COMMERCIAL

## 2021-07-27 ENCOUNTER — HOSPITAL ENCOUNTER (OUTPATIENT)
Dept: GENERAL RADIOLOGY | Age: 41
Discharge: HOME | End: 2021-07-27
Attending: SURGERY
Payer: COMMERCIAL

## 2021-07-27 VITALS
BODY MASS INDEX: 39.86 KG/M2 | HEART RATE: 83 BPM | WEIGHT: 248.02 LBS | SYSTOLIC BLOOD PRESSURE: 117 MMHG | HEIGHT: 66 IN | TEMPERATURE: 98.2 F | DIASTOLIC BLOOD PRESSURE: 83 MMHG

## 2021-07-27 LAB
25(OH)D3 SERPL-MCNC: 19 NG/ML (ref 30–100)
ALBUMIN SERPL-MCNC: 3.6 G/DL (ref 3.5–5)
ALBUMIN/GLOB SERPL: 1 {RATIO} (ref 1.1–2.2)
ALP SERPL-CCNC: 100 U/L (ref 45–117)
ALT SERPL-CCNC: 27 U/L (ref 12–78)
ANION GAP SERPL CALC-SCNC: 4 MMOL/L (ref 5–15)
AST SERPL-CCNC: 15 U/L (ref 15–37)
ATRIAL RATE: 67 BPM
BASOPHILS # BLD: 0 K/UL (ref 0–0.1)
BASOPHILS NFR BLD: 1 % (ref 0–1)
BILIRUB SERPL-MCNC: 0.7 MG/DL (ref 0.2–1)
BUN SERPL-MCNC: 9 MG/DL (ref 6–20)
BUN/CREAT SERPL: 11 (ref 12–20)
CALCIUM SERPL-MCNC: 9.8 MG/DL (ref 8.5–10.1)
CALCULATED P AXIS, ECG09: 0 DEGREES
CALCULATED R AXIS, ECG10: 20 DEGREES
CALCULATED T AXIS, ECG11: -2 DEGREES
CHLORIDE SERPL-SCNC: 107 MMOL/L (ref 97–108)
CO2 SERPL-SCNC: 29 MMOL/L (ref 21–32)
CREAT SERPL-MCNC: 0.83 MG/DL (ref 0.55–1.02)
DIAGNOSIS, 93000: NORMAL
DIFFERENTIAL METHOD BLD: ABNORMAL
EOSINOPHIL # BLD: 0.2 K/UL (ref 0–0.4)
EOSINOPHIL NFR BLD: 2 % (ref 0–7)
ERYTHROCYTE [DISTWIDTH] IN BLOOD BY AUTOMATED COUNT: 13.8 % (ref 11.5–14.5)
EST. AVERAGE GLUCOSE BLD GHB EST-MCNC: 123 MG/DL
GLOBULIN SER CALC-MCNC: 3.7 G/DL (ref 2–4)
GLUCOSE SERPL-MCNC: 88 MG/DL (ref 65–100)
HBA1C MFR BLD: 5.9 % (ref 4–5.6)
HCT VFR BLD AUTO: 42.1 % (ref 35–47)
HGB BLD-MCNC: 13.4 G/DL (ref 11.5–16)
IMM GRANULOCYTES # BLD AUTO: 0 K/UL (ref 0–0.04)
IMM GRANULOCYTES NFR BLD AUTO: 1 % (ref 0–0.5)
IRON SERPL-MCNC: 77 UG/DL (ref 35–150)
LYMPHOCYTES # BLD: 2.9 K/UL (ref 0.8–3.5)
LYMPHOCYTES NFR BLD: 34 % (ref 12–49)
MCH RBC QN AUTO: 29.2 PG (ref 26–34)
MCHC RBC AUTO-ENTMCNC: 31.8 G/DL (ref 30–36.5)
MCV RBC AUTO: 91.7 FL (ref 80–99)
MONOCYTES # BLD: 0.7 K/UL (ref 0–1)
MONOCYTES NFR BLD: 8 % (ref 5–13)
NEUTS SEG # BLD: 4.7 K/UL (ref 1.8–8)
NEUTS SEG NFR BLD: 54 % (ref 32–75)
NRBC # BLD: 0 K/UL (ref 0–0.01)
NRBC BLD-RTO: 0 PER 100 WBC
P-R INTERVAL, ECG05: 154 MS
PLATELET # BLD AUTO: 395 K/UL (ref 150–400)
PMV BLD AUTO: 10.1 FL (ref 8.9–12.9)
POTASSIUM SERPL-SCNC: 4.6 MMOL/L (ref 3.5–5.1)
PROT SERPL-MCNC: 7.3 G/DL (ref 6.4–8.2)
Q-T INTERVAL, ECG07: 378 MS
QRS DURATION, ECG06: 84 MS
QTC CALCULATION (BEZET), ECG08: 399 MS
RBC # BLD AUTO: 4.59 M/UL (ref 3.8–5.2)
SODIUM SERPL-SCNC: 140 MMOL/L (ref 136–145)
TSH SERPL DL<=0.05 MIU/L-ACNC: 0.64 UIU/ML (ref 0.36–3.74)
VENTRICULAR RATE, ECG03: 67 BPM
WBC # BLD AUTO: 8.5 K/UL (ref 3.6–11)

## 2021-07-27 PROCEDURE — 82306 VITAMIN D 25 HYDROXY: CPT

## 2021-07-27 PROCEDURE — 84443 ASSAY THYROID STIM HORMONE: CPT

## 2021-07-27 PROCEDURE — 93005 ELECTROCARDIOGRAM TRACING: CPT

## 2021-07-27 PROCEDURE — 36415 COLL VENOUS BLD VENIPUNCTURE: CPT

## 2021-07-27 PROCEDURE — 80053 COMPREHEN METABOLIC PANEL: CPT

## 2021-07-27 PROCEDURE — 83540 ASSAY OF IRON: CPT

## 2021-07-27 PROCEDURE — 71046 X-RAY EXAM CHEST 2 VIEWS: CPT

## 2021-07-27 PROCEDURE — 85025 COMPLETE CBC W/AUTO DIFF WBC: CPT

## 2021-07-27 PROCEDURE — 83036 HEMOGLOBIN GLYCOSYLATED A1C: CPT

## 2021-07-27 NOTE — PERIOP NOTES
COPY OF COVID-19 VACCINE ATTACHED TO CHART. Pt given 2 bottles of CHG soap and instructed in use. Patient given surgical site infection FAQs handout and hand hygiene tips sheet. Pre-operative instructions reviewed and patient verbalizes understanding of instructions. Patient has been given the opportunity to ask additional questions. Pt given clear liquid diet instructions. PT INSTRUCTED TO HAVE CXR AFTER LEAVING [facility].

## 2021-08-03 ENCOUNTER — OFFICE VISIT (OUTPATIENT)
Dept: SURGERY | Age: 41
End: 2021-08-03

## 2021-08-03 VITALS — WEIGHT: 250 LBS | HEIGHT: 66 IN | BODY MASS INDEX: 40.18 KG/M2

## 2021-08-03 DIAGNOSIS — G89.18 POST-OP PAIN: ICD-10-CM

## 2021-08-03 DIAGNOSIS — E55.9 VITAMIN D DEFICIENCY: Primary | ICD-10-CM

## 2021-08-03 RX ORDER — POLYETHYLENE GLYCOL 3350 17 G/17G
17 POWDER, FOR SOLUTION ORAL DAILY
Qty: 90 PACKET | Refills: 3 | Status: SHIPPED | OUTPATIENT
Start: 2021-08-03 | End: 2021-11-19 | Stop reason: ALTCHOICE

## 2021-08-03 RX ORDER — ERGOCALCIFEROL 1.25 MG/1
50000 CAPSULE ORAL
Qty: 5 CAPSULE | Refills: 3 | Status: SHIPPED | OUTPATIENT
Start: 2021-08-03 | End: 2021-11-19 | Stop reason: SDUPTHER

## 2021-08-03 RX ORDER — GABAPENTIN 100 MG/1
100-200 CAPSULE ORAL
Qty: 30 CAPSULE | Refills: 0 | Status: SHIPPED | OUTPATIENT
Start: 2021-08-03 | End: 2021-08-08

## 2021-08-03 RX ORDER — OMEPRAZOLE 40 MG/1
40 CAPSULE, DELAYED RELEASE ORAL DAILY
Qty: 30 CAPSULE | Refills: 1 | Status: SHIPPED | OUTPATIENT
Start: 2021-08-03 | End: 2021-10-07

## 2021-08-03 RX ORDER — HYOSCYAMINE SULFATE 0.12 MG/1
0.12 TABLET SUBLINGUAL
Qty: 30 TABLET | Refills: 0 | Status: SHIPPED | OUTPATIENT
Start: 2021-08-03 | End: 2021-11-19 | Stop reason: ALTCHOICE

## 2021-08-03 RX ORDER — ONDANSETRON 4 MG/1
4 TABLET, ORALLY DISINTEGRATING ORAL
Qty: 20 TABLET | Refills: 0 | Status: SHIPPED | OUTPATIENT
Start: 2021-08-03 | End: 2021-11-19 | Stop reason: ALTCHOICE

## 2021-08-03 NOTE — PROGRESS NOTES
I was in the office while conducting this encounter. Consent:  She and/or her healthcare decision maker is aware that this patient-initiated Telehealth encounter is a billable service, with coverage as determined by her insurance carrier. She is aware that she may receive a bill and has provided verbal consent to proceed: No - Not billable    This virtual visit was conducted via [employer]. Pursuant to the emergency declaration under the [facility] waiver authority and the [employer] and [employer] Act, this Virtual  Visit was conducted to reduce the patient's risk of exposure to COVID-19 and provide continuity of care for an established patient. Services were provided through a video synchronous discussion virtually to substitute for in-person clinic visit. Due to this being a TeleHealth evaluation, many elements of the physical examination are unable to be assessed. Total Time: minutes: 21-30 minutes. Chief Complaint   Patient presents with    Surg H&P     scheduled for lap sleeve gastrectomy on 8/24/21 by Dr. Vikki Alonzo, # [phone]    Morbid Obesity    Weight Loss       Darrick Cano presents today for presurgical visit in preparation for weight loss surgery. Darrick Cano has completed the pre-surgical requirements, classes and demonstrated readiness for surgery. Patient has had no recent illness, fevers or cough. Darrick Cano has been in their usual state of health. Surgery Type: Laparoscopic sleeve gastrectomy   Date of Surgery: 8/24/2021  Surgeon:  Johnny Kauffman MD  Start Pre op Diet: 8/10/2021    Preadmission testing reviewed face to face with Darrick Cano.   The following recommendations made based on findings:   Vitamin D deficiency (D level was 19) started on vitamin D 50,000 units weekly   Hemoglobin A1c 5.9%    Co-Morbidities:  Prediabetes  Obstructive sleep apnea no CPAP  Vitamin D deficiency     functional status: Independent  Social support: Wife, Linh Burgos      Body mass index is 40.97 kg/m². ICD-10-CM ICD-9-CM    1. Vitamin D deficiency  E55.9 268.9 ergocalciferol (ERGOCALCIFEROL) 1,250 mcg (50,000 unit) capsule   2. Post-op pain  G89.18 338.18 gabapentin (NEURONTIN) 100 mg capsule       Plan:  1. Morbid obesity:  Scheduled for laparoscopic sleeve gastrectomy on 8/24/2021 with Dr. Walton Ee protocol reviewed:  Acetaminophen 1000 mg at noon and 8 pm day before surgery and may have clear liquids (no caffeine, no ETOH, no carbonation and calorie free) until 3 hours prior to surgery   Preadmission testing results reviewed with patient   Post operative prescriptions sent to pharmacy on file for AFTER surgery:    Acid suppression  x 30 days, then reassess need omeprazole 40 mg p.o. daily  Antiemetic Zofran as directed  Antispasmodic Levsin as directed  Laxative:  Miralax 1 packet every day #90/3R   DVT prophylaxis: Early ambulation, SCDs, no personal history of VTE, non-smoker, no hormone replacement or oral contraceptives and no mobility impairment (reported that father had had a blood clot in the past as had her paternal aunt; however, she has had her gallbladder out as well as been a kidney donor and \"not had any issues\")  Post op pain management:  Gabapentin 100-300 mg q 8 hours prn pain x 5 days; acetaminophen 1000 mg po q 8 hours prn; abdominal support / splinting; heating pad prn     Started on liver shrinking diet: 8/10/2021  Reviewed and started Bariatric vitamins   Reviewed post operative diet, restrictions, follow up and medications  Post operative 2, 4 and 6  week appointments to be made  Reviewed educational materials and book    Frederick Joseph is scheduled to meet with to sign consents and address any final questions or concerns. [employer] verbalized understanding and questions were answered to the best of my knowledge and ability.     Pre and postop educational materials were provided.     30 minutes spent virtually with patient

## 2021-08-03 NOTE — PATIENT INSTRUCTIONS
You are scheduled for laparoscopic sleeve gastrectomy with Dr. Nola Ambrosio at [facility] 8/24/2021. Please start your preop diet 8/10/2021     your postoperative prescriptions have been sent to your [facility] pharmacy    Vitamins:     For an \"all in one\" bariatric supplement go to [email or web address] and look for the [employer] Multivitamins with iron for gastric bypass or sleeve.   You can take 2 capsules or 1 chewable and get what you need for a reasonable cost about $35 for a 3 months supply        Day Before Surgery:   -  take (2) Extra Strength Tylenol (acetaminophen) at noon and 8 pm    -  you may drink sugar free clear liquids until 3 hours prior to surgery      your after surgery prescriptions BEFORE surgery     Make your 2, 4 and 6 week appointments for after surgery

## 2021-08-16 ENCOUNTER — OFFICE VISIT (OUTPATIENT)
Dept: SURGERY | Age: 41
End: 2021-08-16
Payer: COMMERCIAL

## 2021-08-16 VITALS
DIASTOLIC BLOOD PRESSURE: 77 MMHG | HEART RATE: 78 BPM | HEIGHT: 66 IN | RESPIRATION RATE: 18 BRPM | TEMPERATURE: 99.1 F | WEIGHT: 246.2 LBS | OXYGEN SATURATION: 93 % | BODY MASS INDEX: 39.57 KG/M2 | SYSTOLIC BLOOD PRESSURE: 122 MMHG

## 2021-08-16 DIAGNOSIS — E66.01 MORBID OBESITY (HCC): Primary | ICD-10-CM

## 2021-08-16 PROCEDURE — 99213 OFFICE O/P EST LOW 20 MIN: CPT | Performed by: SURGERY

## 2021-08-16 NOTE — PROGRESS NOTES
1. Have you been to the ER, urgent care clinic since your last visit? Hospitalized since your last visit? No    2. Have you seen or consulted any other health care providers outside of the [address] [place] [address] since your last visit? Include any pap smears or colon screening.  No

## 2021-08-16 NOTE — H&P (VIEW-ONLY)
[facility]ists at [facility] Surgery History and Physical    History of Present Illness:      Jose Umanzor is a 47 y.o. female who is now ready to proceed forward laparoscopic sleeve gastrectomy. She has been in good health. Past Medical History:   Diagnosis Date    Abnormal Pap smear     2002, f/u WNL    Adverse effect of anesthesia     WOKE UP TOWARDS THE END OF SURGERY WHILE HAVING KIDNEY REMOVED     Chlamydia     8/13    Diabetes ([address]     GDM - diet controlled    Genital herpes, unspecified 2002    no outbreaks in 10 years, taking valtrex    GERD (gastroesophageal reflux disease)     Hernia, hiatal 06/18/2018    Impaired glucose tolerance     Infertility, female     Took clomid for pregnancy    PCOS (polycystic ovarian syndrome)     Polycystic disease, ovaries     Psychiatric disorder     DIAGNOSED GENERALYZED ANXIETY; NO MEDS    Sleep apnea     NO CPAP    Snores     Unspecified vitamin D deficiency        Past Surgical History:   Procedure Laterality Date    HX CHOLECYSTECTOMY  07/11/2018     Laparoscopic cholecystectomy with cholangiogram, interpretation of cholangiogram     HX GASTRIC BYPASS  08/24/2021    LAP. SLEEVE    HX NEPHRECTOMY  2003    patient donated her kidney    HX OTHER SURGICAL  2003    donated Kidney to father     HX OTHER SURGICAL  2012    tonsillectomy    HX POLYPECTOMY  06/18/2018    HX TONSILLECTOMY  2012    HX TRANSPLANT Right 2003    donation of right kidney in 2003 to her father         Current Outpatient Medications:     ergocalciferol (ERGOCALCIFEROL) 1,250 mcg (50,000 unit) capsule, Take 1 Capsule by mouth every seven (7) days. Indications: low vitamin D levels, Disp: 5 Capsule, Rfl: 3    omeprazole (PRILOSEC) 40 mg capsule, Take 1 Capsule by mouth daily.  AFTER SURGERY (Patient not taking: Reported on 8/16/2021), Disp: 30 Capsule, Rfl: 1    ondansetron (ZOFRAN ODT) 4 mg disintegrating tablet, Take 1 Tablet by mouth every eight (8) hours as needed for Nausea or Nausea or Vomiting (AFTER SURGERY). (Patient not taking: Reported on 2021), Disp: 20 Tablet, Rfl: 0    polyethylene glycol (MIRALAX) 17 gram packet, Take 1 Packet by mouth daily. Indications: constipation (Patient not taking: Reported on 2021), Disp: 80 Packet, Rfl: 3    hyoscyamine SL (Levsin/SL) 0.125 mg SL tablet, 1 Tablet by SubLINGual route every four (4) hours as needed for Cramping (CHEST PRESSURE/ SPASM AFTER SURGERY). (Patient not taking: Reported on 2021), Disp: 30 Tablet, Rfl: 0    No Known Allergies    Social History     Socioeconomic History    Marital status:      Spouse name: Not on file    Number of children: Not on file    Years of education: Not on file    Highest education level: Not on file   Occupational History    Not on file   Tobacco Use    Smoking status: Former Smoker     Packs/day: 0.25     Years: 10.00     Pack years: 2.50     Quit date: 2013     Years since quittin.6    Smokeless tobacco: Never Used   Vaping Use    Vaping Use: Never used   Substance and Sexual Activity    Alcohol use: Yes     Alcohol/week: 0.8 standard drinks     Types: 1 Standard drinks or equivalent per week     Comment: ONCE EVERY OTHER MONTH    Drug use: No     Comment: denies     Sexual activity: Yes     Partners: Female     Birth control/protection: None     Comment: single   Other Topics Concern    Not on file   Social History Narrative    In the home with wife, Mabel Vang     Unemployed      Social Determinants of Health     Financial Resource Strain:     Difficulty of Paying Living Expenses:    Food Insecurity:     Worried About Running Out of Food in the Last Year:     Ran Out of Food in the Last Year:    Transportation Needs:     Lack of Transportation (Medical):      Lack of Transportation (Non-Medical):    Physical Activity:     Days of Exercise per Week:     Minutes of Exercise per Session:    Stress:     Feeling of Stress : Social Connections:     Frequency of Communication with Friends and Family:     Frequency of Social Gatherings with Friends and Family:     Attends Church Services:     Active Member of Clubs or Organizations:     Attends Club or Organization Meetings:     Marital Status:    Intimate Partner Violence:     Fear of Current or Ex-Partner:     Emotionally Abused:     Physically Abused:     Sexually Abused:        Family History   Problem Relation Age of Onset    Thyroid Disease Mother     Hypertension Mother     Anxiety Mother     Hypertension Father     Diabetes Father     Kidney Disease Father         X3 TRANSPLANT    Asthma Sister     Other Sister         DONATING KIDNEY TO FATHER    Hypertension Sister     MS Maternal Grandmother     Cancer Paternal Grandmother         endometrial    Diabetes Paternal Grandmother     Hypertension Paternal Grandmother     Heart Disease Paternal Uncle     Heart Attack Paternal Uncle     No Known Problems Daughter     Asthma Sister        ROS   Constitutional: negative  Ears, Nose, Mouth, Throat, and Face: negative  Respiratory: negative  Cardiovascular: negative  Gastrointestinal: negative  Genitourinary:negative  Integument/Breast: negative  Hematologic/Lymphatic: negative  Behavioral/Psychiatric: negative  Allergic/Immunologic: negative      Physical Exam:     Visit Vitals  /77 (BP 1 Location: Right arm, BP Patient Position: Sitting, BP Cuff Size: Adult)   Pulse 78   Temp 99.1 °F (37.3 °C) (Oral)   Resp 18   Ht 5' 5.5\" (1.664 m)   Wt 246 lb 3.2 oz (111.7 kg)   SpO2 93%   BMI 40.35 kg/m²       General - alert and oriented, no apparent distress  HEENT - no jaundice, no hearing imparement  Pulm - CTAB, no C/W/R  CV - RRR, no M/R/G  Abd -soft, nondistended, bowel sounds present, nontender to palpation  Ext - pulses intact in UE and LE bilaterally, no edema  Skin - supple, no rashes  Psychiatric - normal affect, good mood    Labs  Lab Results Component Value Date/Time    Sodium 140 07/27/2021 10:23 AM    Potassium 4.6 07/27/2021 10:23 AM    Chloride 107 07/27/2021 10:23 AM    CO2 29 07/27/2021 10:23 AM    Anion gap 4 (L) 07/27/2021 10:23 AM    Glucose 88 07/27/2021 10:23 AM    BUN 9 07/27/2021 10:23 AM    Creatinine 0.83 07/27/2021 10:23 AM    BUN/Creatinine ratio 11 (L) 07/27/2021 10:23 AM    GFR est AA >60 07/27/2021 10:23 AM    GFR est non-AA >60 07/27/2021 10:23 AM    Calcium 9.8 07/27/2021 10:23 AM    Bilirubin, total 0.7 07/27/2021 10:23 AM    Alk. phosphatase 100 07/27/2021 10:23 AM    Protein, total 7.3 07/27/2021 10:23 AM    Albumin 3.6 07/27/2021 10:23 AM    Globulin 3.7 07/27/2021 10:23 AM    A-G Ratio 1.0 (L) 07/27/2021 10:23 AM    ALT (SGPT) 27 07/27/2021 10:23 AM    AST (SGOT) 15 07/27/2021 10:23 AM     Lab Results   Component Value Date/Time    WBC 8.5 07/27/2021 10:23 AM    HGB 13.4 07/27/2021 10:23 AM    Hematocrit (POC) 36 04/29/2019 04:04 PM    HCT 42.1 07/27/2021 10:23 AM    PLATELET 320 62/51/4166 10:23 AM    MCV 91.7 07/27/2021 10:23 AM         Imaging  Upper GI-Upper GI-Preliminary examination, KUB show evidence of prior cholecystectomy and right  nephrectomy, prominent fecal stasis. Double contrast upper GI is performed, 19 images were obtained, fluoroscopy time  is 1.5 minutes. The swallowing mechanism is intact. Swallowing of the barium tablet did not show  any stasis. Stomach and duodenum appear unremarkable no masses, obstruction or  ulceration.     IMPRESSION  IMPRESSION: Negative examination. Chest x-ray-normal  I have reviewed and agree with all of the pertinent images    Assessment:     [address] [place] [address] Mony is a 47 y.o. female with morbid obesity BMI of 40    Recommendations:     1. She is now ready to proceed forward with laparoscopic sleeve gastrectomy. I have discussed the above procedure with the patient in detail.   We reviewed the benefits and possible complications of the surgery which include bleeding, infection, damage to adjacent organs, venous thromboembolism, need for repeat surgery, death and other unforseen complications. The patient agreed to proceed with the surgery. Lo Delgadillo MD    Greater than half of the time: 20 minutes was used in counciling the patient about diagnosis and treatment plan    Ms. Greer has a reminder for a \"due or due soon\" health maintenance. I have asked that she contact her primary care provider for follow-up on this health maintenance.

## 2021-08-16 NOTE — PROGRESS NOTES
[employer] [facility] at [facility] Surgery History and Physical    History of Present Illness:      Kavita Costa is a 47 y.o. female who is now ready to proceed forward laparoscopic sleeve gastrectomy. She has been in good health. Past Medical History:   Diagnosis Date    Abnormal Pap smear     2002, f/u WNL    Adverse effect of anesthesia     WOKE UP TOWARDS THE END OF SURGERY WHILE HAVING KIDNEY REMOVED     Chlamydia     8/13    Diabetes ([facility] [address]     GDM - diet controlled    Genital herpes, unspecified 2002    no outbreaks in 10 years, taking valtrex    GERD (gastroesophageal reflux disease)     Hernia, hiatal 06/18/2018    Impaired glucose tolerance     Infertility, female     Took clomid for pregnancy    PCOS (polycystic ovarian syndrome)     Polycystic disease, ovaries     Psychiatric disorder     DIAGNOSED GENERALYZED ANXIETY; NO MEDS    Sleep apnea     NO CPAP    Snores     Unspecified vitamin D deficiency        Past Surgical History:   Procedure Laterality Date    HX CHOLECYSTECTOMY  07/11/2018     Laparoscopic cholecystectomy with cholangiogram, interpretation of cholangiogram     HX GASTRIC BYPASS  08/24/2021    LAP. SLEEVE    HX NEPHRECTOMY  2003    patient donated her kidney    HX OTHER SURGICAL  2003    donated Kidney to father     HX OTHER SURGICAL  2012    tonsillectomy    HX POLYPECTOMY  06/18/2018    HX TONSILLECTOMY  2012    HX TRANSPLANT Right 2003    donation of right kidney in 2003 to her father         Current Outpatient Medications:     ergocalciferol (ERGOCALCIFEROL) 1,250 mcg (50,000 unit) capsule, Take 1 Capsule by mouth every seven (7) days. Indications: low vitamin D levels, Disp: 5 Capsule, Rfl: 3    omeprazole (PRILOSEC) 40 mg capsule, Take 1 Capsule by mouth daily.  AFTER SURGERY (Patient not taking: Reported on 8/16/2021), Disp: 30 Capsule, Rfl: 1    ondansetron (ZOFRAN ODT) 4 mg disintegrating tablet, Take 1 Tablet by mouth every eight (8) hours as needed for Nausea or Nausea or Vomiting (AFTER SURGERY). (Patient not taking: Reported on 2021), Disp: 20 Tablet, Rfl: 0    polyethylene glycol (MIRALAX) 17 gram packet, Take 1 Packet by mouth daily. Indications: constipation (Patient not taking: Reported on 2021), Disp: 80 Packet, Rfl: 3    hyoscyamine SL (Levsin/SL) 0.125 mg SL tablet, 1 Tablet by SubLINGual route every four (4) hours as needed for Cramping (CHEST PRESSURE/ SPASM AFTER SURGERY). (Patient not taking: Reported on 2021), Disp: 30 Tablet, Rfl: 0    No Known Allergies    Social History     Socioeconomic History    Marital status:      Spouse name: Not on file    Number of children: Not on file    Years of education: Not on file    Highest education level: Not on file   Occupational History    Not on file   Tobacco Use    Smoking status: Former Smoker     Packs/day: 0.25     Years: 10.00     Pack years: 2.50     Quit date: 2013     Years since quittin.6    Smokeless tobacco: Never Used   Vaping Use    Vaping Use: Never used   Substance and Sexual Activity    Alcohol use: Yes     Alcohol/week: 0.8 standard drinks     Types: 1 Standard drinks or equivalent per week     Comment: ONCE EVERY OTHER MONTH    Drug use: No     Comment: denies     Sexual activity: Yes     Partners: Female     Birth control/protection: None     Comment: single   Other Topics Concern    Not on file   Social History Narrative    In the home with wife, Aakash Hodge     Unemployed      Social Determinants of Health     Financial Resource Strain:     Difficulty of Paying Living Expenses:    Food Insecurity:     Worried About Running Out of Food in the Last Year:     Ran Out of Food in the Last Year:    Transportation Needs:     Lack of Transportation (Medical):      Lack of Transportation (Non-Medical):    Physical Activity:     Days of Exercise per Week:     Minutes of Exercise per Session:    Stress:     Feeling of Stress : Social Connections:     Frequency of Communication with Friends and Family:     Frequency of Social Gatherings with Friends and Family:     Attends Buddhism Services:     Active Member of Clubs or Organizations:     Attends Club or Organization Meetings:     Marital Status:    Intimate Partner Violence:     Fear of Current or Ex-Partner:     Emotionally Abused:     Physically Abused:     Sexually Abused:        Family History   Problem Relation Age of Onset    Thyroid Disease Mother     Hypertension Mother     Anxiety Mother     Hypertension Father     Diabetes Father     Kidney Disease Father         X3 TRANSPLANT    Asthma Sister     Other Sister         DONATING KIDNEY TO FATHER    Hypertension Sister     MS Maternal Grandmother     Cancer Paternal Grandmother         endometrial    Diabetes Paternal Grandmother     Hypertension Paternal Grandmother     Heart Disease Paternal Uncle     Heart Attack Paternal Uncle     No Known Problems Daughter     Asthma Sister        ROS   Constitutional: negative  Ears, Nose, Mouth, Throat, and Face: negative  Respiratory: negative  Cardiovascular: negative  Gastrointestinal: negative  Genitourinary:negative  Integument/Breast: negative  Hematologic/Lymphatic: negative  Behavioral/Psychiatric: negative  Allergic/Immunologic: negative      Physical Exam:     Visit Vitals  /77 (BP 1 Location: Right arm, BP Patient Position: Sitting, BP Cuff Size: Adult)   Pulse 78   Temp 99.1 °F (37.3 °C) (Oral)   Resp 18   Ht 5' 5.5\" (1.664 m)   Wt 246 lb 3.2 oz (111.7 kg)   SpO2 93%   BMI 40.35 kg/m²       General - alert and oriented, no apparent distress  HEENT - no jaundice, no hearing imparement  Pulm - CTAB, no C/W/R  CV - RRR, no M/R/G  Abd -soft, nondistended, bowel sounds present, nontender to palpation  Ext - pulses intact in UE and LE bilaterally, no edema  Skin - supple, no rashes  Psychiatric - normal affect, good mood    Labs  Lab Results Component Value Date/Time    Sodium 140 07/27/2021 10:23 AM    Potassium 4.6 07/27/2021 10:23 AM    Chloride 107 07/27/2021 10:23 AM    CO2 29 07/27/2021 10:23 AM    Anion gap 4 (L) 07/27/2021 10:23 AM    Glucose 88 07/27/2021 10:23 AM    BUN 9 07/27/2021 10:23 AM    Creatinine 0.83 07/27/2021 10:23 AM    BUN/Creatinine ratio 11 (L) 07/27/2021 10:23 AM    GFR est AA >60 07/27/2021 10:23 AM    GFR est non-AA >60 07/27/2021 10:23 AM    Calcium 9.8 07/27/2021 10:23 AM    Bilirubin, total 0.7 07/27/2021 10:23 AM    Alk. phosphatase 100 07/27/2021 10:23 AM    Protein, total 7.3 07/27/2021 10:23 AM    Albumin 3.6 07/27/2021 10:23 AM    Globulin 3.7 07/27/2021 10:23 AM    A-G Ratio 1.0 (L) 07/27/2021 10:23 AM    ALT (SGPT) 27 07/27/2021 10:23 AM    AST (SGOT) 15 07/27/2021 10:23 AM     Lab Results   Component Value Date/Time    WBC 8.5 07/27/2021 10:23 AM    HGB 13.4 07/27/2021 10:23 AM    Hematocrit (POC) 36 04/29/2019 04:04 PM    HCT 42.1 07/27/2021 10:23 AM    PLATELET 460 94/66/8374 10:23 AM    MCV 91.7 07/27/2021 10:23 AM         Imaging  Upper GI-Upper GI-Preliminary examination, KUB show evidence of prior cholecystectomy and right  nephrectomy, prominent fecal stasis. Double contrast upper GI is performed, 19 images were obtained, fluoroscopy time  is 1.5 minutes. The swallowing mechanism is intact. Swallowing of the barium tablet did not show  any stasis. Stomach and duodenum appear unremarkable no masses, obstruction or  ulceration.     IMPRESSION  IMPRESSION: Negative examination. Chest x-ray-normal  I have reviewed and agree with all of the pertinent images    Assessment:     [address] [place] [address] Mony is a 47 y.o. female with morbid obesity BMI of 40    Recommendations:     1. She is now ready to proceed forward with laparoscopic sleeve gastrectomy. I have discussed the above procedure with the patient in detail.   We reviewed the benefits and possible complications of the surgery which include bleeding, infection, damage to adjacent organs, venous thromboembolism, need for repeat surgery, death and other unforseen complications. The patient agreed to proceed with the surgery. Timmy Mejia MD    Greater than half of the time: 20 minutes was used in counciling the patient about diagnosis and treatment plan    Ms. Greer has a reminder for a \"due or due soon\" health maintenance. I have asked that she contact her primary care provider for follow-up on this health maintenance.

## 2021-08-16 NOTE — LETTER
8/16/2021    Patient: Eduardo Islas   YOB: 1980   Date of Visit: 8/16/2021     Tianna Laughlin MD  [id number] [address]  [place]    Dear Tianna Laughlin MD,      Thank you for referring Ms. Fabienne Greer to [facility] for evaluation. My notes for this consultation are attached. If you have questions, please do not hesitate to call me. I look forward to following your patient along with you.       Sincerely,    Apple Veliz MD

## 2021-08-23 ENCOUNTER — ANESTHESIA EVENT (OUTPATIENT)
Dept: SURGERY | Age: 41
DRG: 621 | End: 2021-08-23
Payer: COMMERCIAL

## 2021-08-24 ENCOUNTER — HOSPITAL ENCOUNTER (INPATIENT)
Age: 41
LOS: 1 days | Discharge: HOME | DRG: 621 | End: 2021-08-25
Attending: SURGERY | Admitting: SURGERY
Payer: COMMERCIAL

## 2021-08-24 ENCOUNTER — ANESTHESIA (OUTPATIENT)
Dept: SURGERY | Age: 41
DRG: 621 | End: 2021-08-24
Payer: COMMERCIAL

## 2021-08-24 DIAGNOSIS — E66.01 MORBID OBESITY WITH BMI OF 40.0-44.9, ADULT (HCC): ICD-10-CM

## 2021-08-24 LAB
GLUCOSE BLD STRIP.AUTO-MCNC: 161 MG/DL (ref 65–117)
HCG UR QL: NEGATIVE
SERVICE CMNT-IMP: ABNORMAL

## 2021-08-24 PROCEDURE — 43775 LAP SLEEVE GASTRECTOMY: CPT | Performed by: SURGERY

## 2021-08-24 PROCEDURE — 74011250636 HC RX REV CODE- 250/636: Performed by: NURSE ANESTHETIST, CERTIFIED REGISTERED

## 2021-08-24 PROCEDURE — 77030022704 HC SUT VLOC COVD -B: Performed by: SURGERY

## 2021-08-24 PROCEDURE — 74011250636 HC RX REV CODE- 250/636: Performed by: SURGERY

## 2021-08-24 PROCEDURE — 77030026438 HC STYL ET INTUB CARD -A: Performed by: ANESTHESIOLOGY

## 2021-08-24 PROCEDURE — 88307 TISSUE EXAM BY PATHOLOGIST: CPT

## 2021-08-24 PROCEDURE — 2709999900 HC NON-CHARGEABLE SUPPLY: Performed by: SURGERY

## 2021-08-24 PROCEDURE — 77030031139 HC SUT VCRL2 J&J -A: Performed by: SURGERY

## 2021-08-24 PROCEDURE — 74011250637 HC RX REV CODE- 250/637: Performed by: SURGERY

## 2021-08-24 PROCEDURE — 77030040956 HC DSCTR SONICISION MEDT -I: Performed by: SURGERY

## 2021-08-24 PROCEDURE — 77030034208 HC SLV GASTRCTMY 3D CAL SYS DISP BOEH -C: Performed by: SURGERY

## 2021-08-24 PROCEDURE — 77030008684 HC TU ET CUF COVD -B: Performed by: ANESTHESIOLOGY

## 2021-08-24 PROCEDURE — 81025 URINE PREGNANCY TEST: CPT

## 2021-08-24 PROCEDURE — 74011000250 HC RX REV CODE- 250: Performed by: ANESTHESIOLOGY

## 2021-08-24 PROCEDURE — 77030040922 HC BLNKT HYPOTHRM STRY -A

## 2021-08-24 PROCEDURE — 0DJ08ZZ INSPECTION OF UPPER INTESTINAL TRACT, VIA NATURAL OR ARTIFICIAL OPENING ENDOSCOPIC: ICD-10-PCS | Performed by: SURGERY

## 2021-08-24 PROCEDURE — 65660000000 HC RM CCU STEPDOWN

## 2021-08-24 PROCEDURE — 77030040361 HC SLV COMPR DVT MDII -B: Performed by: SURGERY

## 2021-08-24 PROCEDURE — 74011250636 HC RX REV CODE- 250/636: Performed by: ANESTHESIOLOGY

## 2021-08-24 PROCEDURE — 77030037368 HC STPLR ENDO TRI-STPLR COVD -E: Performed by: SURGERY

## 2021-08-24 PROCEDURE — 82962 GLUCOSE BLOOD TEST: CPT

## 2021-08-24 PROCEDURE — 77030008756 HC TU IRR SUC STRY -B: Performed by: SURGERY

## 2021-08-24 PROCEDURE — 0DB64Z3 EXCISION OF STOMACH, PERCUTANEOUS ENDOSCOPIC APPROACH, VERTICAL: ICD-10-PCS | Performed by: SURGERY

## 2021-08-24 PROCEDURE — 74011000250 HC RX REV CODE- 250: Performed by: NURSE ANESTHETIST, CERTIFIED REGISTERED

## 2021-08-24 PROCEDURE — 77030016151 HC PROTCTR LNS DFOG COVD -B: Performed by: SURGERY

## 2021-08-24 PROCEDURE — 76010000153 HC OR TIME 1.5 TO 2 HR: Performed by: SURGERY

## 2021-08-24 PROCEDURE — 77030010507 HC ADH SKN DERMBND J&J -B: Performed by: SURGERY

## 2021-08-24 PROCEDURE — 74011000250 HC RX REV CODE- 250: Performed by: SURGERY

## 2021-08-24 PROCEDURE — 77030037032 HC INSRT SCIS CLICKLLINE DISP STOR -B: Performed by: SURGERY

## 2021-08-24 PROCEDURE — 77030002895 HC DEV VASC CLOSR COVD -B: Performed by: SURGERY

## 2021-08-24 PROCEDURE — 77030014090 HC TRCR OPT AMR -B: Performed by: SURGERY

## 2021-08-24 PROCEDURE — 76060000035 HC ANESTHESIA 2 TO 2.5 HR: Performed by: SURGERY

## 2021-08-24 PROCEDURE — 77030012770 HC TRCR OPT FX AMR -B: Performed by: SURGERY

## 2021-08-24 PROCEDURE — 77030020263 HC SOL INJ SOD CL0.9% LFCR 1000ML: Performed by: SURGERY

## 2021-08-24 PROCEDURE — 77030002933 HC SUT MCRYL J&J -A: Performed by: SURGERY

## 2021-08-24 PROCEDURE — 77030020829: Performed by: SURGERY

## 2021-08-24 PROCEDURE — 77030038157 HC DEV PWR CNTR DISP SIGNIA COVD -C: Performed by: SURGERY

## 2021-08-24 PROCEDURE — 76210000000 HC OR PH I REC 2 TO 2.5 HR: Performed by: SURGERY

## 2021-08-24 PROCEDURE — 77030037367 HC STPLR ENDO TRI-STPLR COVD -D: Performed by: SURGERY

## 2021-08-24 RX ORDER — HYOSCYAMINE SULFATE 0.12 MG/1
0.12 TABLET SUBLINGUAL
Status: DISCONTINUED | OUTPATIENT
Start: 2021-08-24 | End: 2021-08-25 | Stop reason: HOSPADM

## 2021-08-24 RX ORDER — ONDANSETRON 2 MG/ML
4 INJECTION INTRAMUSCULAR; INTRAVENOUS AS NEEDED
Status: DISCONTINUED | OUTPATIENT
Start: 2021-08-24 | End: 2021-08-24 | Stop reason: HOSPADM

## 2021-08-24 RX ORDER — MAGNESIUM SULFATE HEPTAHYDRATE 40 MG/ML
INJECTION, SOLUTION INTRAVENOUS AS NEEDED
Status: DISCONTINUED | OUTPATIENT
Start: 2021-08-24 | End: 2021-08-24 | Stop reason: HOSPADM

## 2021-08-24 RX ORDER — SODIUM CHLORIDE, SODIUM LACTATE, POTASSIUM CHLORIDE, CALCIUM CHLORIDE 600; 310; 30; 20 MG/100ML; MG/100ML; MG/100ML; MG/100ML
50 INJECTION, SOLUTION INTRAVENOUS CONTINUOUS
Status: DISCONTINUED | OUTPATIENT
Start: 2021-08-24 | End: 2021-08-24 | Stop reason: HOSPADM

## 2021-08-24 RX ORDER — LIDOCAINE HYDROCHLORIDE 20 MG/ML
INJECTION, SOLUTION EPIDURAL; INFILTRATION; INTRACAUDAL; PERINEURAL AS NEEDED
Status: DISCONTINUED | OUTPATIENT
Start: 2021-08-24 | End: 2021-08-24 | Stop reason: HOSPADM

## 2021-08-24 RX ORDER — LIDOCAINE HYDROCHLORIDE ANHYDROUS AND DEXTROSE MONOHYDRATE .8; 5 G/100ML; G/100ML
INJECTION, SOLUTION INTRAVENOUS
Status: DISCONTINUED | OUTPATIENT
Start: 2021-08-24 | End: 2021-08-24 | Stop reason: HOSPADM

## 2021-08-24 RX ORDER — KETAMINE HYDROCHLORIDE 10 MG/ML
INJECTION, SOLUTION INTRAMUSCULAR; INTRAVENOUS AS NEEDED
Status: DISCONTINUED | OUTPATIENT
Start: 2021-08-24 | End: 2021-08-24 | Stop reason: HOSPADM

## 2021-08-24 RX ORDER — DIPHENHYDRAMINE HYDROCHLORIDE 50 MG/ML
12.5 INJECTION, SOLUTION INTRAMUSCULAR; INTRAVENOUS
Status: ACTIVE | OUTPATIENT
Start: 2021-08-24 | End: 2021-08-25

## 2021-08-24 RX ORDER — NEOSTIGMINE METHYLSULFATE 1 MG/ML
INJECTION, SOLUTION INTRAVENOUS AS NEEDED
Status: DISCONTINUED | OUTPATIENT
Start: 2021-08-24 | End: 2021-08-24 | Stop reason: HOSPADM

## 2021-08-24 RX ORDER — MIDAZOLAM HYDROCHLORIDE 1 MG/ML
1 INJECTION, SOLUTION INTRAMUSCULAR; INTRAVENOUS AS NEEDED
Status: DISCONTINUED | OUTPATIENT
Start: 2021-08-24 | End: 2021-08-24 | Stop reason: HOSPADM

## 2021-08-24 RX ORDER — HYDROMORPHONE HYDROCHLORIDE 1 MG/ML
0.5 INJECTION, SOLUTION INTRAMUSCULAR; INTRAVENOUS; SUBCUTANEOUS
Status: DISCONTINUED | OUTPATIENT
Start: 2021-08-24 | End: 2021-08-25 | Stop reason: HOSPADM

## 2021-08-24 RX ORDER — SODIUM CHLORIDE 0.9 % (FLUSH) 0.9 %
5-40 SYRINGE (ML) INJECTION AS NEEDED
Status: DISCONTINUED | OUTPATIENT
Start: 2021-08-24 | End: 2021-08-25 | Stop reason: HOSPADM

## 2021-08-24 RX ORDER — HYDROMORPHONE HYDROCHLORIDE 1 MG/ML
1 INJECTION, SOLUTION INTRAMUSCULAR; INTRAVENOUS; SUBCUTANEOUS
Status: DISCONTINUED | OUTPATIENT
Start: 2021-08-24 | End: 2021-08-25 | Stop reason: HOSPADM

## 2021-08-24 RX ORDER — PHENYLEPHRINE HCL IN 0.9% NACL 0.4MG/10ML
SYRINGE (ML) INTRAVENOUS AS NEEDED
Status: DISCONTINUED | OUTPATIENT
Start: 2021-08-24 | End: 2021-08-24 | Stop reason: HOSPADM

## 2021-08-24 RX ORDER — SODIUM CHLORIDE 0.9 % (FLUSH) 0.9 %
5-40 SYRINGE (ML) INJECTION EVERY 8 HOURS
Status: DISCONTINUED | OUTPATIENT
Start: 2021-08-24 | End: 2021-08-24 | Stop reason: HOSPADM

## 2021-08-24 RX ORDER — PANTOPRAZOLE SODIUM 40 MG/1
40 TABLET, DELAYED RELEASE ORAL DAILY
Status: DISCONTINUED | OUTPATIENT
Start: 2021-08-25 | End: 2021-08-25 | Stop reason: HOSPADM

## 2021-08-24 RX ORDER — ONDANSETRON 2 MG/ML
4 INJECTION INTRAMUSCULAR; INTRAVENOUS
Status: DISCONTINUED | OUTPATIENT
Start: 2021-08-24 | End: 2021-08-25 | Stop reason: HOSPADM

## 2021-08-24 RX ORDER — SODIUM CHLORIDE 0.9 % (FLUSH) 0.9 %
5-40 SYRINGE (ML) INJECTION EVERY 8 HOURS
Status: DISCONTINUED | OUTPATIENT
Start: 2021-08-24 | End: 2021-08-25 | Stop reason: HOSPADM

## 2021-08-24 RX ORDER — FENTANYL CITRATE 50 UG/ML
25 INJECTION, SOLUTION INTRAMUSCULAR; INTRAVENOUS
Status: COMPLETED | OUTPATIENT
Start: 2021-08-24 | End: 2021-08-24

## 2021-08-24 RX ORDER — SODIUM CHLORIDE, SODIUM LACTATE, POTASSIUM CHLORIDE, CALCIUM CHLORIDE 600; 310; 30; 20 MG/100ML; MG/100ML; MG/100ML; MG/100ML
500 INJECTION, SOLUTION INTRAVENOUS CONTINUOUS
Status: DISCONTINUED | OUTPATIENT
Start: 2021-08-24 | End: 2021-08-24

## 2021-08-24 RX ORDER — ROCURONIUM BROMIDE 10 MG/ML
INJECTION, SOLUTION INTRAVENOUS AS NEEDED
Status: DISCONTINUED | OUTPATIENT
Start: 2021-08-24 | End: 2021-08-24 | Stop reason: HOSPADM

## 2021-08-24 RX ORDER — FENTANYL CITRATE 50 UG/ML
INJECTION, SOLUTION INTRAMUSCULAR; INTRAVENOUS AS NEEDED
Status: DISCONTINUED | OUTPATIENT
Start: 2021-08-24 | End: 2021-08-24 | Stop reason: HOSPADM

## 2021-08-24 RX ORDER — GABAPENTIN 250 MG/5ML
500 SOLUTION ORAL ONCE
Status: COMPLETED | OUTPATIENT
Start: 2021-08-24 | End: 2021-08-24

## 2021-08-24 RX ORDER — SODIUM CHLORIDE 0.9 % (FLUSH) 0.9 %
5-40 SYRINGE (ML) INJECTION AS NEEDED
Status: DISCONTINUED | OUTPATIENT
Start: 2021-08-24 | End: 2021-08-24 | Stop reason: HOSPADM

## 2021-08-24 RX ORDER — NALOXONE HYDROCHLORIDE 0.4 MG/ML
0.4 INJECTION, SOLUTION INTRAMUSCULAR; INTRAVENOUS; SUBCUTANEOUS AS NEEDED
Status: DISCONTINUED | OUTPATIENT
Start: 2021-08-24 | End: 2021-08-25 | Stop reason: HOSPADM

## 2021-08-24 RX ORDER — ACETAMINOPHEN 325 MG/1
650 TABLET ORAL ONCE
Status: DISCONTINUED | OUTPATIENT
Start: 2021-08-24 | End: 2021-08-24 | Stop reason: HOSPADM

## 2021-08-24 RX ORDER — SCOLOPAMINE TRANSDERMAL SYSTEM 1 MG/1
1 PATCH, EXTENDED RELEASE TRANSDERMAL ONCE
Status: DISCONTINUED | OUTPATIENT
Start: 2021-08-24 | End: 2021-08-25 | Stop reason: HOSPADM

## 2021-08-24 RX ORDER — ENOXAPARIN SODIUM 100 MG/ML
40 INJECTION SUBCUTANEOUS EVERY 24 HOURS
Status: DISCONTINUED | OUTPATIENT
Start: 2021-08-25 | End: 2021-08-25 | Stop reason: HOSPADM

## 2021-08-24 RX ORDER — BUPIVACAINE HYDROCHLORIDE AND EPINEPHRINE 5; 5 MG/ML; UG/ML
INJECTION, SOLUTION EPIDURAL; INTRACAUDAL; PERINEURAL AS NEEDED
Status: DISCONTINUED | OUTPATIENT
Start: 2021-08-24 | End: 2021-08-24 | Stop reason: HOSPADM

## 2021-08-24 RX ORDER — SUCCINYLCHOLINE CHLORIDE 20 MG/ML
INJECTION INTRAMUSCULAR; INTRAVENOUS AS NEEDED
Status: DISCONTINUED | OUTPATIENT
Start: 2021-08-24 | End: 2021-08-24 | Stop reason: HOSPADM

## 2021-08-24 RX ORDER — ACETAMINOPHEN 500 MG
1000 TABLET ORAL EVERY 6 HOURS
Status: DISCONTINUED | OUTPATIENT
Start: 2021-08-24 | End: 2021-08-25 | Stop reason: HOSPADM

## 2021-08-24 RX ORDER — LIDOCAINE HYDROCHLORIDE 10 MG/ML
0.1 INJECTION, SOLUTION EPIDURAL; INFILTRATION; INTRACAUDAL; PERINEURAL AS NEEDED
Status: DISCONTINUED | OUTPATIENT
Start: 2021-08-24 | End: 2021-08-24 | Stop reason: HOSPADM

## 2021-08-24 RX ORDER — MIDAZOLAM HYDROCHLORIDE 1 MG/ML
INJECTION, SOLUTION INTRAMUSCULAR; INTRAVENOUS AS NEEDED
Status: DISCONTINUED | OUTPATIENT
Start: 2021-08-24 | End: 2021-08-24 | Stop reason: HOSPADM

## 2021-08-24 RX ORDER — PROPOFOL 10 MG/ML
INJECTION, EMULSION INTRAVENOUS AS NEEDED
Status: DISCONTINUED | OUTPATIENT
Start: 2021-08-24 | End: 2021-08-24 | Stop reason: HOSPADM

## 2021-08-24 RX ORDER — LORAZEPAM 2 MG/ML
0.5 INJECTION INTRAMUSCULAR
Status: DISCONTINUED | OUTPATIENT
Start: 2021-08-24 | End: 2021-08-25 | Stop reason: HOSPADM

## 2021-08-24 RX ORDER — KETOROLAC TROMETHAMINE 30 MG/ML
15 INJECTION, SOLUTION INTRAMUSCULAR; INTRAVENOUS EVERY 6 HOURS
Status: DISCONTINUED | OUTPATIENT
Start: 2021-08-24 | End: 2021-08-25 | Stop reason: HOSPADM

## 2021-08-24 RX ORDER — FENTANYL CITRATE 50 UG/ML
50 INJECTION, SOLUTION INTRAMUSCULAR; INTRAVENOUS AS NEEDED
Status: DISCONTINUED | OUTPATIENT
Start: 2021-08-24 | End: 2021-08-24 | Stop reason: HOSPADM

## 2021-08-24 RX ORDER — SODIUM CHLORIDE, SODIUM LACTATE, POTASSIUM CHLORIDE, CALCIUM CHLORIDE 600; 310; 30; 20 MG/100ML; MG/100ML; MG/100ML; MG/100ML
INJECTION, SOLUTION INTRAVENOUS
Status: DISCONTINUED | OUTPATIENT
Start: 2021-08-24 | End: 2021-08-24 | Stop reason: HOSPADM

## 2021-08-24 RX ORDER — GABAPENTIN 100 MG/1
200 CAPSULE ORAL 2 TIMES DAILY
Status: DISCONTINUED | OUTPATIENT
Start: 2021-08-24 | End: 2021-08-25 | Stop reason: HOSPADM

## 2021-08-24 RX ORDER — ENOXAPARIN SODIUM 100 MG/ML
40 INJECTION SUBCUTANEOUS EVERY 24 HOURS
Status: DISCONTINUED | OUTPATIENT
Start: 2021-08-24 | End: 2021-08-24

## 2021-08-24 RX ORDER — GLYCOPYRROLATE 0.2 MG/ML
INJECTION INTRAMUSCULAR; INTRAVENOUS AS NEEDED
Status: DISCONTINUED | OUTPATIENT
Start: 2021-08-24 | End: 2021-08-24 | Stop reason: HOSPADM

## 2021-08-24 RX ORDER — HYDROMORPHONE HYDROCHLORIDE 1 MG/ML
0.2 INJECTION, SOLUTION INTRAMUSCULAR; INTRAVENOUS; SUBCUTANEOUS
Status: DISCONTINUED | OUTPATIENT
Start: 2021-08-24 | End: 2021-08-24 | Stop reason: HOSPADM

## 2021-08-24 RX ORDER — SODIUM CHLORIDE, SODIUM LACTATE, POTASSIUM CHLORIDE, CALCIUM CHLORIDE 600; 310; 30; 20 MG/100ML; MG/100ML; MG/100ML; MG/100ML
125 INJECTION, SOLUTION INTRAVENOUS CONTINUOUS
Status: DISCONTINUED | OUTPATIENT
Start: 2021-08-24 | End: 2021-08-25 | Stop reason: HOSPADM

## 2021-08-24 RX ADMIN — SODIUM CHLORIDE, POTASSIUM CHLORIDE, SODIUM LACTATE AND CALCIUM CHLORIDE: 600; 310; 30; 20 INJECTION, SOLUTION INTRAVENOUS at 07:20

## 2021-08-24 RX ADMIN — FENTANYL CITRATE 100 MCG: 50 INJECTION, SOLUTION INTRAMUSCULAR; INTRAVENOUS at 07:30

## 2021-08-24 RX ADMIN — ENOXAPARIN SODIUM 40 MG: 40 INJECTION SUBCUTANEOUS at 06:40

## 2021-08-24 RX ADMIN — ROCURONIUM BROMIDE 5 MG: 10 SOLUTION INTRAVENOUS at 07:41

## 2021-08-24 RX ADMIN — HYDROMORPHONE HYDROCHLORIDE 1 MG: 1 INJECTION, SOLUTION INTRAMUSCULAR; INTRAVENOUS; SUBCUTANEOUS at 14:36

## 2021-08-24 RX ADMIN — SODIUM CHLORIDE, POTASSIUM CHLORIDE, SODIUM LACTATE AND CALCIUM CHLORIDE 125 ML/HR: 600; 310; 30; 20 INJECTION, SOLUTION INTRAVENOUS at 11:30

## 2021-08-24 RX ADMIN — MIDAZOLAM 2 MG: 1 INJECTION INTRAMUSCULAR; INTRAVENOUS at 07:30

## 2021-08-24 RX ADMIN — Medication 10 ML: at 14:37

## 2021-08-24 RX ADMIN — ONDANSETRON 4 MG: 2 INJECTION INTRAMUSCULAR; INTRAVENOUS at 09:40

## 2021-08-24 RX ADMIN — MAGNESIUM SULFATE 2 G: 2 INJECTION INTRAVENOUS at 07:58

## 2021-08-24 RX ADMIN — HYDROMORPHONE HYDROCHLORIDE 0.2 MG: 1 INJECTION, SOLUTION INTRAMUSCULAR; INTRAVENOUS; SUBCUTANEOUS at 12:05

## 2021-08-24 RX ADMIN — KETAMINE HYDROCHLORIDE 30 MG: 10 INJECTION, SOLUTION INTRAMUSCULAR; INTRAVENOUS at 07:41

## 2021-08-24 RX ADMIN — HYDROMORPHONE HYDROCHLORIDE 0.2 MG: 1 INJECTION, SOLUTION INTRAMUSCULAR; INTRAVENOUS; SUBCUTANEOUS at 11:20

## 2021-08-24 RX ADMIN — SODIUM CHLORIDE, POTASSIUM CHLORIDE, SODIUM LACTATE AND CALCIUM CHLORIDE 125 ML/HR: 600; 310; 30; 20 INJECTION, SOLUTION INTRAVENOUS at 20:22

## 2021-08-24 RX ADMIN — FENTANYL CITRATE 25 MCG: 50 INJECTION, SOLUTION INTRAMUSCULAR; INTRAVENOUS at 09:40

## 2021-08-24 RX ADMIN — SODIUM CHLORIDE, POTASSIUM CHLORIDE, SODIUM LACTATE AND CALCIUM CHLORIDE 500 ML/HR: 600; 310; 30; 20 INJECTION, SOLUTION INTRAVENOUS at 06:50

## 2021-08-24 RX ADMIN — PROPOFOL 200 MG: 10 INJECTION, EMULSION INTRAVENOUS at 07:41

## 2021-08-24 RX ADMIN — NEOSTIGMINE METHYLSULFATE 4 MG: 1 INJECTION, SOLUTION INTRAVENOUS at 09:00

## 2021-08-24 RX ADMIN — FENTANYL CITRATE 25 MCG: 50 INJECTION, SOLUTION INTRAMUSCULAR; INTRAVENOUS at 09:45

## 2021-08-24 RX ADMIN — CEFAZOLIN 3 G: 1 INJECTION, POWDER, FOR SOLUTION INTRAMUSCULAR; INTRAVENOUS; PARENTERAL at 07:43

## 2021-08-24 RX ADMIN — ACETAMINOPHEN 1000 MG: 500 TABLET ORAL at 17:34

## 2021-08-24 RX ADMIN — LIDOCAINE HYDROCHLORIDE 2 MG/KG/HR: 8 INJECTION, SOLUTION INTRAVENOUS at 07:58

## 2021-08-24 RX ADMIN — GLYCOPYRROLATE 0.4 MG: 0.2 INJECTION, SOLUTION INTRAMUSCULAR; INTRAVENOUS at 09:00

## 2021-08-24 RX ADMIN — GABAPENTIN 500 MG: 250 SOLUTION ORAL at 06:35

## 2021-08-24 RX ADMIN — SODIUM CHLORIDE, POTASSIUM CHLORIDE, SODIUM LACTATE AND CALCIUM CHLORIDE: 600; 310; 30; 20 INJECTION, SOLUTION INTRAVENOUS at 08:14

## 2021-08-24 RX ADMIN — Medication 80 MCG: at 08:32

## 2021-08-24 RX ADMIN — Medication 80 MCG: at 08:29

## 2021-08-24 RX ADMIN — HYDROMORPHONE HYDROCHLORIDE 0.2 MG: 1 INJECTION, SOLUTION INTRAMUSCULAR; INTRAVENOUS; SUBCUTANEOUS at 11:05

## 2021-08-24 RX ADMIN — SUCCINYLCHOLINE CHLORIDE 160 MG: 20 INJECTION, SOLUTION INTRAMUSCULAR; INTRAVENOUS at 07:41

## 2021-08-24 RX ADMIN — GABAPENTIN 200 MG: 100 CAPSULE ORAL at 17:34

## 2021-08-24 RX ADMIN — ACETAMINOPHEN ORAL SOLUTION 1000 MG: 650 SOLUTION ORAL at 06:37

## 2021-08-24 RX ADMIN — FENTANYL CITRATE 25 MCG: 50 INJECTION, SOLUTION INTRAMUSCULAR; INTRAVENOUS at 10:20

## 2021-08-24 RX ADMIN — HYDROMORPHONE HYDROCHLORIDE 1 MG: 1 INJECTION, SOLUTION INTRAMUSCULAR; INTRAVENOUS; SUBCUTANEOUS at 20:18

## 2021-08-24 RX ADMIN — KETOROLAC TROMETHAMINE 15 MG: 30 INJECTION, SOLUTION INTRAMUSCULAR; INTRAVENOUS at 14:36

## 2021-08-24 RX ADMIN — ROCURONIUM BROMIDE 25 MG: 10 SOLUTION INTRAVENOUS at 07:46

## 2021-08-24 RX ADMIN — FENTANYL CITRATE 25 MCG: 50 INJECTION, SOLUTION INTRAMUSCULAR; INTRAVENOUS at 10:50

## 2021-08-24 RX ADMIN — KETOROLAC TROMETHAMINE 15 MG: 30 INJECTION, SOLUTION INTRAMUSCULAR; INTRAVENOUS at 20:17

## 2021-08-24 RX ADMIN — LIDOCAINE HYDROCHLORIDE 100 MG: 20 INJECTION, SOLUTION EPIDURAL; INFILTRATION; INTRACAUDAL; PERINEURAL at 07:41

## 2021-08-24 NOTE — PERIOP NOTES
4020 - Patient's family member (spouse, Deedee Morton) called and updated on patient progression and start of procedure.

## 2021-08-24 NOTE — BRIEF OP NOTE
Brief Postoperative Note    Patient: Mel Torres  YOB: 1980  MRN: [id number]    Date of Procedure: 8/24/2021     Pre-Op Diagnosis: MORBID OBESITY    Post-Op Diagnosis: Same as preoperative diagnosis.       Procedure(s):  LAPAROSCOPIC SLEEVE GASTRECTOMY WITH EGD (E R A S)  ESOPHAGOGASTRODUODENOSCOPY (EGD)    Surgeon(s):  Kathy Obando MD    Surgical Assistant: Surg Asst-1: Morena Chaudhary    Anesthesia: General     Estimated Blood Loss (mL): less than 50     Complications: None    Specimens: * No specimens in log *     Implants: * No implants in log *    Drains: * No LDAs found *    Findings: normal sleeve gastrectomy over a 40Fr Visigi Bougie, normal post op EGD, negative leak test    Electronically Signed by Maritza Christiansen MD on 8/24/2021 at 9:01 AM

## 2021-08-24 NOTE — PERIOP NOTES
Endoscope was pre-cleaned at bedside immediately following procedure by ALFIE Coker RN . Button lot # [id number].

## 2021-08-24 NOTE — PERIOP NOTES
Patient: Kavita Costa MRN: [id number]  SSN: [id number]   YOB: 1980  Age: 47 y.o. Sex: female     Patient is status post Procedure(s):  LAPAROSCOPIC SLEEVE GASTRECTOMY WITH EGD (E R A S)  ESOPHAGOGASTRODUODENOSCOPY (EGD).     Surgeon(s) and Role:     Alexander Hdez MD - Primary    Local/Dose/Irrigation:  See Sandi Guerin                  Peripheral IV 08/24/21 Left;Posterior Wrist (Active)            Airway - Endotracheal Tube 08/24/21 (Active)                   Dressing/Packing:  Incision 08/24/21 Abdomen-Dressing/Treatment: Surgical glue (08/24/21 0900)  x5 abdominal trocar sites  ]

## 2021-08-24 NOTE — OP NOTES
[facility]  OPERATIVE REPORT    Name:  Keagan Hidalgo  MR#:  [id number]  :  1980  ACCOUNT #:  [de-identified]  DATE OF SERVICE:  2021      PREOPERATIVE DIAGNOSIS:  Morbid obesity. POSTOPERATIVE DIAGNOSIS:  Morbid obesity. PROCEDURE PERFORMED:  Laparoscopic sleeve gastrectomy with esophagogastroduodenoscopy. SURGEON:  Juan Carmona MD    ASSISTANT:  Randall Wild SA    ANESTHESIA:  General.    COMPLICATIONS:  None. SPECIMENS REMOVED:  None. IMPLANTS:  None. ESTIMATED BLOOD LOSS:  Less than 50 mL. DRAINS:  None. FINDINGS:  Normal sleeve gastrectomy over a 40-Faroese ViSiGi bougie, normal postop EGD, negative leak test.    INDICATIONS FOR THE OPERATION:  The patient is a 46-year-old female who has a history of morbid obesity with a preoperative BMI of 40.35 with bariatric comorbidities including GERD, polycystic ovary disease, obstructive sleep apnea, who has been preoperatively evaluated for bariatric surgery. She has been through the necessary preoperative education and is now ready for surgery. DESCRIPTION OF THE OPERATION:  The patient was met in the preop holding area. The H and P was updated. Consent was signed. All risks and benefits were explained to the patient prior to the start of the operation. She was taken back to the operating room. She was lying in the supine position. The abdomen was prepped and draped in standard sterile fashion. Time-out was called. Antibiotics were given. SCDs were on lower extremities. Started the operation by making a 5-mm incision into the right upper quadrant, inserting a VisiPort trocar into the intra-abdominal cavity, insufflating to 15 mmHg. We then placed a 5-mm trocar superior and to the left of the umbilicus, a 68-DQ port superior and to the right of the umbilicus, and then a right upper quadrant 5-mm port. All the ports were placed under direct visualization.   There were no injuries to the underlying abdominal structures. We then placed a subxiphoid liver retractor, lifting the liver up and out of the way. We could see the stomach and hiatus, all appeared to be normal.  There was no hiatal hernia. We then marked an area about 6 cm proximal to the pylorus which would be the starting point of our sleeve staple line. We then started taking down the gastrocolic ligament from the mid body of the stomach, traveling up superiorly along the greater curve, taking down the short gastric vessels, all with the Sonicision device up along the angle of His and taking down the area next to the left rasheeda. The stomach was fully mobilized superiorly. We then continued this dissection inferiorly down to our sharon about 6 cm proximal to pylorus, fully mobilizing the greater curve of the stomach with the Sonicision device. We then had the 40-Amharic ViSiGi bougie placed down the mouth into the esophagus and down into the sleeve stomach. It was placed along the lesser curve down into the area of the antrum and pylorus. It was hooked to suction. It was in good position. The stomach was decompressed. We then started creating a vertical sleeve gastrectomy with a black load with TRS reinforcement of the Signia 60-mm stapler. We then used 3 more purple loads to create the rest of the sleeve gastrectomy using 60-mm purple load with TRS reinforcement. The staple line was all intact, it all looked good. Our staple lines were hemostatic and the stomach looked to be nice and straight. We then placed our partial gastrectomy specimen into the right upper quadrant away from the operative field. We had the 40-Amharic ViSiGi bougie removed from the stomach off of suction and we oversewed the staple line with a running 2-0 absorbable V-Loc suture, running that down the staple line down towards the bottom of the stomach, buttressing with omentum laterally.   The stomach had a nice natural lie and a nice natural curvature and then we performed our endoscopy. We placed the endoscope down the mouth into the esophagus and down into the stomach. The stomach had a nice natural curvature. There was no bending, twisting, or angulation. There was no bleeding from the staple line. There was no leak during our leak test and we were able to get the scope down into the area of the pylorus and antrum into the duodenum which all appeared to be normal.  We then desufflated the stomach and our leak test was negative. We then desufflated the stomach removing the endoscope, passing it off the field. We removed our partial gastrectomy specimen from the 15-mm port site after dilating the site with a fascial dilator. We then passed off our partial gastrectomy specimen for Pathology and looked back into the operative field, finding everything to be hemostatic. We then closed our 15-mm port fascial defect with an Endo Close suture passing device in interrupted figure-of-eight fashion with 0 Vicryl suture. We then removed our subxiphoid liver retractor and then desufflated the abdominal cavity, removed the trocars and closed the skin with 4-0 Monocryl and Dermabond to complete the operation. Dr. Samaria Harris was present and scrubbed during the entire operation. The counts were correct.         Angelica Cooley MD      [id number]  D:  08/24/2021 9:12  T:  08/24/2021 11:44  JOB #:  [id number]

## 2021-08-24 NOTE — INTERVAL H&P NOTE
Update History & Physical      The surgery was reviewed with the patient and I examined the patient. There was no change. The surgical site was confirmed by the patient and me. Plan:  The risk, benefits, expected outcome, and alternative to the recommended procedure have been discussed with the patient. Patient understands and wants to proceed with the procedure.     Electronically signed by Juan Carmona MD on 8/24/2021 at 7:05 AM

## 2021-08-24 NOTE — ANESTHESIA POSTPROCEDURE EVALUATION
Post-Anesthesia Evaluation and Assessment    Patient: Rizwan Nguyen MRN: [id number]  SSN: [id number]    YOB: 1980  Age: 47 y.o. Sex: female      I have evaluated the patient and they are stable and ready for discharge from the PACU. Cardiovascular Function/Vital Signs  Visit Vitals  /88   Pulse 74   Temp 36.1 °C (97 °F)   Resp 21   Ht 5' 5.5\" (1.664 m)   Wt 109.7 kg (241 lb 13.5 oz)   SpO2 100%   BMI 39.63 kg/m²       Patient is status post General anesthesia for Procedure(s):  LAPAROSCOPIC SLEEVE GASTRECTOMY WITH EGD (E R A S)  ESOPHAGOGASTRODUODENOSCOPY (EGD). Nausea/Vomiting: None    Postoperative hydration reviewed and adequate. Pain:  Pain Scale 1: Numeric (0 - 10) (08/24/21 0940)  Pain Intensity 1: 7 (08/24/21 0940)   Managed    Neurological Status:   Neuro (WDL): Within Defined Limits (08/24/21 0926)   At baseline    Mental Status, Level of Consciousness: Alert and  oriented to person, place, and time    Pulmonary Status:   O2 Device: Nasal cannula (08/24/21 0926)   Adequate oxygenation and airway patent    Complications related to anesthesia: None    Post-anesthesia assessment completed.  No concerns    Signed By: Kristina Rios MD     August 24, 2021

## 2021-08-24 NOTE — PERIOP NOTES
1140: Updated family via surgical waiting desk. Will continue to provide updates as they are available. TRANSFER - OUT REPORT:    Verbal report given to JARROD Champion (name) on [address]  being transferred to [address] [place] [address] (unit) for routine post - op       Report consisted of patients Situation, Background, Assessment and   Recommendations(SBAR). Time Pre op antibiotic given: 8263  Anesthesia Stop time: 8891  Shell Present on Transfer to floor: NO  Order for Shell on Chart: N/A  Discharge Prescriptions with Chart: N/A    Information from the following report(s) OR Summary, Intake/Output, MAR, Recent Results, Med Rec Status and Cardiac Rhythm SR was reviewed with the receiving nurse. Opportunity for questions and clarification was provided. Is the patient on 02? YES       L/Min 2    Is the patient on a monitor? NO    Is the nurse transporting with the patient? NO    Surgical Waiting Area notified of patient's transfer from PACU? YES      The following personal items collected during your admission accompanied patient upon transfer:   Dental Appliance: Dental Appliances: None  Vision:    Hearing Aid:    Jewelry:    Clothing: Clothing: Other (comment) (clothing bag returned to patient in pacu)  Other Valuables:  Other Valuables: Eyeglasses (returned to patient in pacu)  Valuables sent to safe:

## 2021-08-24 NOTE — ANESTHESIA PREPROCEDURE EVALUATION
Anesthetic History   No history of anesthetic complications            Review of Systems / Medical History  Patient summary reviewed, nursing notes reviewed and pertinent labs reviewed    Pulmonary        Sleep apnea        Comments:  Former Smoker - 2.5 pack years   Neuro/Psych         Psychiatric history     Cardiovascular  Within defined limits                Exercise tolerance: >4 METS     GI/Hepatic/Renal     GERD           Endo/Other    Diabetes    Morbid obesity     Other Findings   Comments: Chlamydia  Genital herpes  Polycystic disease, ovaries           Physical Exam    Airway  Mallampati: II  TM Distance: 4 - 6 cm  Neck ROM: normal range of motion   Mouth opening: Normal     Cardiovascular  Regular rate and rhythm,  S1 and S2 normal,  no murmur, click, rub, or gallop             Dental  No notable dental hx       Pulmonary  Breath sounds clear to auscultation               Abdominal  GI exam deferred       Other Findings            Anesthetic Plan    ASA: 2  Anesthesia type: general          Induction: Intravenous  Anesthetic plan and risks discussed with: Patient

## 2021-08-25 VITALS
BODY MASS INDEX: 38.87 KG/M2 | HEIGHT: 66 IN | TEMPERATURE: 98.4 F | WEIGHT: 241.84 LBS | RESPIRATION RATE: 16 BRPM | SYSTOLIC BLOOD PRESSURE: 102 MMHG | DIASTOLIC BLOOD PRESSURE: 68 MMHG | OXYGEN SATURATION: 98 % | HEART RATE: 68 BPM

## 2021-08-25 LAB
ANION GAP SERPL CALC-SCNC: 7 MMOL/L (ref 5–15)
BUN SERPL-MCNC: 5 MG/DL (ref 6–20)
BUN/CREAT SERPL: 7 (ref 12–20)
CALCIUM SERPL-MCNC: 7.6 MG/DL (ref 8.5–10.1)
CHLORIDE SERPL-SCNC: 110 MMOL/L (ref 97–108)
CO2 SERPL-SCNC: 23 MMOL/L (ref 21–32)
CREAT SERPL-MCNC: 0.75 MG/DL (ref 0.55–1.02)
ERYTHROCYTE [DISTWIDTH] IN BLOOD BY AUTOMATED COUNT: 14 % (ref 11.5–14.5)
GLUCOSE SERPL-MCNC: 125 MG/DL (ref 65–100)
HCT VFR BLD AUTO: 28 % (ref 35–47)
HGB BLD-MCNC: 9.3 G/DL (ref 11.5–16)
MCH RBC QN AUTO: 30.3 PG (ref 26–34)
MCHC RBC AUTO-ENTMCNC: 33.2 G/DL (ref 30–36.5)
MCV RBC AUTO: 91.2 FL (ref 80–99)
NRBC # BLD: 0 K/UL (ref 0–0.01)
NRBC BLD-RTO: 0 PER 100 WBC
PLATELET # BLD AUTO: 266 K/UL (ref 150–400)
PMV BLD AUTO: 10 FL (ref 8.9–12.9)
POTASSIUM SERPL-SCNC: 4 MMOL/L (ref 3.5–5.1)
RBC # BLD AUTO: 3.07 M/UL (ref 3.8–5.2)
SODIUM SERPL-SCNC: 140 MMOL/L (ref 136–145)
WBC # BLD AUTO: 10.6 K/UL (ref 3.6–11)

## 2021-08-25 PROCEDURE — 36415 COLL VENOUS BLD VENIPUNCTURE: CPT

## 2021-08-25 PROCEDURE — 99024 POSTOP FOLLOW-UP VISIT: CPT | Performed by: PHYSICIAN ASSISTANT

## 2021-08-25 PROCEDURE — 74011250636 HC RX REV CODE- 250/636: Performed by: SURGERY

## 2021-08-25 PROCEDURE — 80048 BASIC METABOLIC PNL TOTAL CA: CPT

## 2021-08-25 PROCEDURE — 74011250637 HC RX REV CODE- 250/637: Performed by: SURGERY

## 2021-08-25 PROCEDURE — 85027 COMPLETE CBC AUTOMATED: CPT

## 2021-08-25 RX ORDER — HYDROMORPHONE HYDROCHLORIDE 2 MG/1
4 TABLET ORAL
Status: DISCONTINUED | OUTPATIENT
Start: 2021-08-25 | End: 2021-08-25 | Stop reason: HOSPADM

## 2021-08-25 RX ORDER — HYDROMORPHONE HYDROCHLORIDE 2 MG/1
2 TABLET ORAL
Qty: 30 TABLET | Refills: 0 | Status: SHIPPED | OUTPATIENT
Start: 2021-08-25 | End: 2021-09-01

## 2021-08-25 RX ADMIN — ENOXAPARIN SODIUM 40 MG: 40 INJECTION SUBCUTANEOUS at 10:46

## 2021-08-25 RX ADMIN — SODIUM CHLORIDE, POTASSIUM CHLORIDE, SODIUM LACTATE AND CALCIUM CHLORIDE 125 ML/HR: 600; 310; 30; 20 INJECTION, SOLUTION INTRAVENOUS at 13:26

## 2021-08-25 RX ADMIN — HYDROMORPHONE HYDROCHLORIDE 0.5 MG: 1 INJECTION, SOLUTION INTRAMUSCULAR; INTRAVENOUS; SUBCUTANEOUS at 00:42

## 2021-08-25 RX ADMIN — PANTOPRAZOLE SODIUM 40 MG: 40 TABLET, DELAYED RELEASE ORAL at 10:46

## 2021-08-25 RX ADMIN — KETOROLAC TROMETHAMINE 15 MG: 30 INJECTION, SOLUTION INTRAMUSCULAR; INTRAVENOUS at 14:41

## 2021-08-25 RX ADMIN — Medication 10 ML: at 14:00

## 2021-08-25 RX ADMIN — KETOROLAC TROMETHAMINE 15 MG: 30 INJECTION, SOLUTION INTRAMUSCULAR; INTRAVENOUS at 10:46

## 2021-08-25 RX ADMIN — ACETAMINOPHEN ORAL SOLUTION 1000 MG: 650 SOLUTION ORAL at 00:33

## 2021-08-25 RX ADMIN — HYDROMORPHONE HYDROCHLORIDE 4 MG: 2 TABLET ORAL at 10:46

## 2021-08-25 RX ADMIN — ACETAMINOPHEN ORAL SOLUTION 1000 MG: 650 SOLUTION ORAL at 07:19

## 2021-08-25 RX ADMIN — SODIUM CHLORIDE, POTASSIUM CHLORIDE, SODIUM LACTATE AND CALCIUM CHLORIDE 125 ML/HR: 600; 310; 30; 20 INJECTION, SOLUTION INTRAVENOUS at 03:39

## 2021-08-25 RX ADMIN — KETOROLAC TROMETHAMINE 15 MG: 30 INJECTION, SOLUTION INTRAMUSCULAR; INTRAVENOUS at 03:25

## 2021-08-25 RX ADMIN — GABAPENTIN 200 MG: 100 CAPSULE ORAL at 10:45

## 2021-08-25 NOTE — ROUTINE PROCESS
Bedside shift change report given to [facility] Michael RN (oncoming nurse) by Thierno Peres RN and Jerome Shoemaker RN (offgoing nurse). Report included the following information SBAR and Kardex.

## 2021-08-25 NOTE — CONSULTS
NUTRITION     Chart reviewed. Post-op bariatric diet instruction completed. Will gladly follow up for additional questions as needed. Thank you.      Danny Sidhu RD

## 2021-08-25 NOTE — DISCHARGE SUMMARY
Physician Discharge Summary     Patient ID:  Yanick Resendiz  [id number]  10 y.o.  1980    Allergies: Patient has no known allergies. Admit Date: 8/24/2021    Discharge Date: 8/25/2021    * Admission Diagnoses: Morbid obesity with BMI of 40.0-44.9, adult ([facility] [E66.01, Z68.41]    * Discharge Diagnoses:    Hospital Problems as of 8/25/2021 Date Reviewed: 8/16/2021        Codes Class Noted - Resolved POA    Morbid obesity with BMI of 40.0-44.9, adult [facility]) ICD-10-CM: E66.01, Z68.41  ICD-9-CM: 278.01, V85.41  8/24/2021 - Present Unknown               Admission Condition: Good    * Discharge Condition: good    * Procedures: Procedure(s):  LAPAROSCOPIC SLEEVE GASTRECTOMY WITH EGD (E R A S)  ESOPHAGOGASTRODUODENOSCOPY (EGD)    * Hospital Course:   Normal hospital course for this procedure. Pt started bariatric liquids without issue  Seen by Dietician to review diet and vitamins  Transitioned to oral analgesics and pain well controlled   Pt  DC to home  POD 1  doing well with follow up appointment in place        Consults: Nutrition     Significant Diagnostic Studies: na    * Disposition: Home    Discharge Medications:   Current Discharge Medication List      START taking these medications    Details   HYDROmorphone (DILAUDID) 2 mg tablet Take 1 Tablet by mouth every four (4) hours as needed for Pain for up to 7 days. Max Daily Amount: 12 mg.  Qty: 30 Tablet, Refills: 0  Start date: 8/25/2021, End date: 9/1/2021    Associated Diagnoses: Morbid obesity with BMI of 40.0-44.9, adult ([facility]         CONTINUE these medications which have NOT CHANGED    Details   ergocalciferol (ERGOCALCIFEROL) 1,250 mcg (50,000 unit) capsule Take 1 Capsule by mouth every seven (7) days. Indications: low vitamin D levels  Qty: 5 Capsule, Refills: 3    Associated Diagnoses: Vitamin D deficiency      omeprazole (PRILOSEC) 40 mg capsule Take 1 Capsule by mouth daily.  AFTER SURGERY  Qty: 30 Capsule, Refills: 1      ondansetron (ZOFRAN ODT) 4 mg disintegrating tablet Take 1 Tablet by mouth every eight (8) hours as needed for Nausea or Nausea or Vomiting (AFTER SURGERY). Qty: 20 Tablet, Refills: 0      polyethylene glycol (MIRALAX) 17 gram packet Take 1 Packet by mouth daily. Indications: constipation  Qty: 90 Packet, Refills: 3      hyoscyamine SL (Levsin/SL) 0.125 mg SL tablet 1 Tablet by SubLINGual route every four (4) hours as needed for Cramping (CHEST PRESSURE/ SPASM AFTER SURGERY). Qty: 30 Tablet, Refills: 0             * Follow-up Care/Patient Instructions:   Activity: No driving while on analgesics and No heavy lifting for 4 weeks  Diet: Bariatric full liquids for 2 weeks post op  Wound Care: Keep wound clean and dry    Future Appointments   Date Time Provider Miller Jessica   9/10/2021  9:40 AM ROMY Mendez  AMB   9/24/2021 11:00 AM ROMY Mendez AMB   10/8/2021 11:40 AM Roddy Ordonez NP [facility] BS AMB           Follow-up tests/labs na    Signed:  BRENDON Franklin  8/25/2021  2:14 PM

## 2021-08-25 NOTE — PROGRESS NOTES
Pt is doing really well this afternoon  Drinking 4 ounces hourly with no issues and has had > 20 ounces over past 5 hours  No n/v  Her pain has been minimal and well controlled  Ambulating frequently   /84   Pulse 76   Temp 98.3 °F (36.8 °C)   Resp 16   Ht 5' 5.5\" (1.664 m)   Wt 241 lb 13.5 oz (109.7 kg)   SpO2 100%   BMI 39.63 kg/m²    oob in the chair drinking water, well appearing, abdomen is appropriately tender    A/p    POD 1 s/p lap sleeve gastrectomy    Ok for DC to home  Seen by nutrition  All instructions reviewed with pt including post op liquid diet, incision care, activity level, medications, bowel regimen, follow up  Future Appointments   Date Time Provider Miller Jessica   9/10/2021  9:40 AM Elenaata Kitchen, ROMY JO BS AMB   9/24/2021 11:00 AM Edmond Kitchen, ROMY FLOR AMB   10/8/2021 11:40 AM Edmond Kitlurdes, ROMY [facility] BS AMB     All questions addressed  BRENDON Watson

## 2021-08-25 NOTE — PROGRESS NOTES
Written and verbal instructions provided for pt along with supplies for home use. Pt aware of all important things to report to md and things that would be considered emergent. Pt aware of all follow up appointments and times, aware to  prescription for pain med at her pharmacy of choice. Pt had no questions or concerns at time of discharge. Discharged from unit via wheelchair by PCT.

## 2021-08-25 NOTE — PROGRESS NOTES
[employer] [facility]    POD #1    Subjective     Doing well, no N/V, pain minimal    Objective     Patient Vitals for the past 24 hrs:   Temp Pulse Resp BP SpO2   08/25/21 0604  69      08/25/21 0410  63      08/25/21 0348 98.5 °F (36.9 °C) 65 16 99/67 98 %   08/25/21 0229  66      08/25/21 0058 97.9 °F (36.6 °C)   104/68 100 %   08/25/21 0017 98.1 °F (36.7 °C) 77 18 (!) 96/55 96 %   08/24/21 2256  67      08/24/21 2047 97.8 °F (36.6 °C) 87 16 95/66 95 %   08/24/21 2020  92      08/24/21 1655 98.2 °F (36.8 °C) 90 18 132/85 95 %   08/24/21 1555 98 °F (36.7 °C) 91 18 126/87 95 %   08/24/21 1355 98.1 °F (36.7 °C) 87 18 (!) 144/86 97 %   08/24/21 1315  99 20 136/88 100 %   08/24/21 1245 97.6 °F (36.4 °C) 95 22 (!) 133/94 99 %   08/24/21 1230  80 21 (!) 132/93 100 %   08/24/21 1215  81 19 134/89 100 %   08/24/21 1200  76 9 (!) 135/97 100 %   08/24/21 1145  91 23 (!) 134/93 100 %   08/24/21 1130 97.7 °F (36.5 °C) 86 18 137/89 100 %   08/24/21 1115  79 17 133/86 100 %   08/24/21 1100  72 19 135/89 100 %   08/24/21 1045  74 21 136/88 100 %   08/24/21 1030  75 19 132/88 100 %   08/24/21 1015  75 17 (!) 143/89 100 %   08/24/21 1000  76 21 (!) 131/90 100 %   08/24/21 0950  84 18 129/87 100 %   08/24/21 0945  84 18 137/89 100 %   08/24/21 0940  78 27 (!) 149/92 99 %   08/24/21 0935  84 21 133/88 100 %   08/24/21 0930  88 21 (!) 113/104 100 %   08/24/21 0926 97 °F (36.1 °C) 90 27 (!) 142/86 100 %       Date 08/24/21 0700 - 08/25/21 0659 08/25/21 0700 - 08/26/21 0659   Shift 7935-4408 0112-4933 24 Hour Total 1387-9131 5503-6130 24 Hour Total   INTAKE   I.V.(mL/kg/hr) 1600(1.2)  1600(0.6)        I.V. 450  450        Volume (lactated Ringers infusion) 1000  1000        Volume (magnesium sulfate 2 g/50 ml IVPB (premix or compounded)) 50  50        Volume (ceFAZolin (ANCEF) 3 g in 0.9%  ml IVPB) 100  100 Shift Total(mL/kg) 1600(14.6)  1600(14.6)      OUTPUT   Urine(mL/kg/hr) 800(0.6) 1050(0.8) 1850(0.7)        Urine Voided 800 1050 1850      Shift Total(mL/kg) 800(7.3) 1050(9.6) 1850(16.9)       -1050 -250      Weight (kg) 109.7 109.7 109.7 109.7 109.7 109.7       PE  Pulm - CTAB  CV - RRR  Abd - soft, ND, BS present, incisions c/d/i    Labs  Recent Results (from the past 12 hour(s))   METABOLIC PANEL, BASIC    Collection Time: 08/25/21  3:35 AM   Result Value Ref Range    Sodium 140 136 - 145 mmol/L    Potassium 4.0 3.5 - 5.1 mmol/L    Chloride 110 (H) 97 - 108 mmol/L    CO2 23 21 - 32 mmol/L    Anion gap 7 5 - 15 mmol/L    Glucose 125 (H) 65 - 100 mg/dL    BUN 5 (L) 6 - 20 MG/DL    Creatinine 0.75 0.55 - 1.02 MG/DL    BUN/Creatinine ratio 7 (L) 12 - 20      GFR est AA >60 >60 ml/min/1.73m2    GFR est non-AA >60 >60 ml/min/1.73m2    Calcium 7.6 (L) 8.5 - 10.1 MG/DL   CBC W/O DIFF    Collection Time: 08/25/21  3:35 AM   Result Value Ref Range    WBC 10.6 3.6 - 11.0 K/uL    RBC 3.07 (L) 3.80 - 5.20 M/uL    HGB 9.3 (L) 11.5 - 16.0 g/dL    HCT 28.0 (L) 35.0 - 47.0 %    MCV 91.2 80.0 - 99.0 FL    MCH 30.3 26.0 - 34.0 PG    MCHC 33.2 30.0 - 36.5 g/dL    RDW 14.0 11.5 - 14.5 %    PLATELET 102 171 - 888 K/uL    MPV 10.0 8.9 - 12.9 FL    NRBC 0.0 0  WBC    ABSOLUTE NRBC 0.00 0.00 - 0.01 K/uL         Assessment     Crystal N [place] is a 47 y. o.yr old female s/p laparoscopic sleeve gastrectomy    Plan     Doing well  Start liquid diet today  OOB more today  Continue IVF  lovenox SQ to start today  Hgb down post op but no signs of any blood loss, VS normal, no melena or hematemesis  Possible DC home today later    Eduar Zhong MD

## 2021-08-25 NOTE — DISCHARGE INSTRUCTIONS
Laparoscopic Sleeve Gastrectomy    Patient Discharge Instructions    Tra Dean / [id number] : 1980    Admitted 2021 Discharged:        · It is important that you take the medication exactly as they are prescribed. · Keep your medication in the bottles provided by the pharmacist and keep a list of the medication names, dosages, and times to be taken in your wallet. · Do not take other medications without consulting your doctor. · Wound care: You may shower starting tomorrow. Do not remove the dermabond on the incision, it will fall of on its own in a few weeks. · No heavy lifting or strenuous activity for 2wks after the operation    Please add a B12 500mcg supplement daily. Because part of your stomach has been removed, you are more at risk for a B12 deficiency. Diet:  Full liquid Bariatric diet as outlined in your education book and on the handout provided by the dietician. Gastric Sleeve  Patient Discharge Instructions  [address] [facility]   [phone]    1. Activities: You may be active walking, stair climbing, and doing light weight activities with one to two-pound weights, sitting in a chair using different arm motions. Major restrictions include driving until your first office visit and lifting anything heavier than ten pounds. It is very important that you walk frequently. In addition to walking, you should continue to use your incentive spirometer (breathing exercises) throughout the day. 2. Caring for vour incision (s}: Your surgical incision(s) will be covered with Derma bond (super glue). You may shower as desired and simply pat dry the area over the incision(s). There may occasionally be seepage of yellowish to yellowish-maroon dissolved fat from your wound, which is of no major concern as long as the wound is not red, hardened in the area of the drainage, or if the drainage has a foul smell.  If there are any questions, call our office for further instructions. If there is this type of dissolved fat drainage,  the shower and gently express the fluid from your wound. Then keep gauze pads over the area to protect both the wound and your clothes. 3. When to call the office immediately:      *Chest pain (not associated with eating/drinking)  *Shortness of breath (more than normal)  *Sudden pain and/or redness in calf  *Fever greater than 101F  *Persistent nausea and/or vomiting (unable to keep down any liquids)  *Bleeding from incision(s)  *Severe abdominal pain  *Any other concerning symptoms    4. Diet: There are three main priorities as far as your diet is concerned---    a. Clear Liquids-drink from 1 oz. cups or standard shot glass. These liquids are non-carbonated, no added sugar, and not irritating to your stomach. They may include water, tea, coffee\" 100% no added sugar juices (avoid citrus) , clear broth, blenderized clear soups such as [employer] Healthy Request Chicken Noodle and Chicken & Rice, Sugar-free products including popsicles, Pierce-Aid, and Crystal Lite. b. Vitamins: Multi-vitamin with iron, Vitamin B-12 and D capsule may be taken as recommended. c. Protein Intake: During your initial two-three weeks when your body is switching from burning glucose to directly burning fat, there is an attempt by your body to use protein as a fuel. To protect that protein, we like you to exercise as listed above, and to try to take in 50-60 grams of protein per day. This can be done with four 8 oz. serving of skim milk and Low Carb Hazard Instant Breakfast. Each 8 oz. should be considered a meal-breakfast, lunch, or supper, and during this mealtime it should be the sole ingested substance. Stop your clear liquids for 20 minutes before your start on the instant breakfast, which is sipped 1 oz. at a time.  You may also try the following substitute for Hazard Instant Breakfast: skim milk-fat free powdered milk + Egg Beaters + flavor extract. If desired, ice may be added and blenderized in the . If the milk upsets your stomach, you may purchase Lactaid tablets from any drug store. Lactaid skim milk may be purchased at most major supermarkets. Another alternative is Boost Diabetic, which is Lactose-free and ready to drink. There are many protein supplements on the market. Whey and Soy based protein powders/drinks are acceptable. If you have any questions about specific products please call the office. d. Other foods may be taken if you have met the requirements of a, b, and c. These foods should be low fat, low sugar, and low spice, and blenderized to the point that, if needed, could be sucked through a straw. One of the favorite treats is dietetic canned fruit blenderized with a combination of its juices and crushed ice, and then eaten in small amounts with a spoon. A smooth low-calorie, low-fat yogurt (should be 100 calories or less) is acceptable as is low-fat or fat-free cottage cheese. If you are having difficulty with your diet, please call the office. 5. Medications: Take no more than 2 pills at a time. Wait 20 minutes between pills. a. Vitamins as listed above---multi-vitamin with iron twice a day, B-12 once a day, vitamin D.    b. Acid reducing medicine--Will be prescribed by your surgeon at discharge. c. Mylanta Plus--one to two teaspoons as needed for belching or gas (other gas relieving medicines are also acceptable Beano, GasX, etc). d. Bowel Regulation--mild laxatives are permissible such as Milk of Magnesia, Dulcolax (either by mouth or suppository). If you are accustomed to using a [id number] laxative not mentioned, you may continue to use it. Fibercon tablets or Benefiber may be taken as a fiber supplement to regulate bowel movements. Fibercon tablets should be broken in half and taken in half and taken one half in the morning and one half in the evening.   Benefiber (1 tsp.) can be added to your protein drink(s). It has no taste or added thickness. Imodium AD may be taken as needed for diarrhea.    e. Pain medication-one to two every four hours as needed for pain control. If pain is mild, try extra-strength Tylenol first.    f. Do not take any pain or arthritis medication such as Aspirin, Advil, Aleve, Naprosyn, or any other nonsteroidal anti-inflammatory medication unless approved by your surgeon. A Tylenol product is OK.    g. Preadmission medications may be resumed, but this should be discussed with your doctor before your discharge from the hospital.    6.  Follow up Office Visits:  call our office at [phone] if you have any questions or concerns. Your appointment should be 2 weeks from your surgery date. Do not plan on returning to work prior to this office visit unless discussed with your doctor. Information obtained by :  I understand that if any problems occur once I am at home I am to contact my physician. I understand and acknowledge receipt of the instructions indicated above.                                                                                                                                            Physician's or R.N.'s Signature                                                                  Date/Time                                                                                                                                              Patient or Representative Signature                                                          Date/Time         Margo Cano, MD Almaz Rhodes, PAGE [place], [address] [facility]

## 2021-08-25 NOTE — PROGRESS NOTES
TRANSFER - IN REPORT:    Verbal report received from [facility] JARROD BEAR(name) on [address]  being received from PACU (unit) for routine post - op      Report consisted of patients Situation, Background, Assessment and   Recommendations(SBAR). Information from the following report(s) SBAR, Kardex and OR Summary was reviewed with the receiving nurse. Opportunity for questions and clarification was provided. Assessment completed upon patients arrival to unit and care assumed. Bedside shift change report given to Pura Omer RN (oncoming nurse) by Dave Rivera RN (offgoing nurse). Report included the following information SBAR and Kardex.

## 2021-08-26 ENCOUNTER — TELEPHONE (OUTPATIENT)
Dept: SURGERY | Age: 41
End: 2021-08-26

## 2021-08-26 NOTE — TELEPHONE ENCOUNTER
Bariatric Post-Operative Phone Calls: 48 hour phone call    Diet:Question of any nausea and/or vomiting. Protein intake (goal is 60 grams of protein daily)   Poor____Fair_X___Good____Great____     Comment:_No N/V consumes 15 gms protein shake this morning_____________________________________________________________      ______________________________________________________________________    Hydration:Less than 32 ounces of water daily is fair to poor (Goal is 64 ounces per day)   Poor_X___ Fair____ Good____Great____    Comment: Consumed 8 oz water this morning 15 oz yesterday ______________________________________________________________    ______________________________________________________________________      Ambulation:( walking at least 3 x week, for 15- 20 minutes)     Poor______ Fair__X____ Good______     Great______ Comment:_Walking 5-10 min around the home 2 times a day_________________________________________________    ______________________________________________________________________      Urine Color: Question of any odor and color(should be angela, pale, and clear) Dark______ Amber______ Pale______      Clear_X_____ Comment:_No issues voiding__________________________________________________                           ________________________________________________________________    Bowel movements: Question of any constipation- haven't had any bowel movements for more than 3 days. This could be related to protein intake and/or narcotic pain medication usage. Comment:                                                      Had a BM this morning small taking the miralex                                                                         Pain: Left sided abdominal pain is normal (should be less than 3)  Question if pain medication is helpful.  10___ 9___ 8___ 7___ 6___ 5___ 4___ 3___     2_X__1___0___Comment:_C/O Rt sided abdominal pain_took the gabapentin this morning the diaudid last night _______________________________________________    ______________________________________________________________________      Incision: (No redness, pain, swelling or fever) Healing Well_X_____     Healed______Redness_________ Pain_________     Swelling_________ Fever__________(greater than 101 needs evaluation)    Comment:__No redness swelling or drainage__________________________________________________________    ______________________________________________________________________  Use of incentive spirometer: Yes_X___       No           Next Appointment:_9/10/21 at 9:40 AM_____________                 Support Group: Yes__X____No______    Additional Comments:_I encouraged patient to work on her fluid intake. __________________________________________________________    ____________________________________________________________________      If more than one parameter is not met or considered poor, nurse needs to discuss with provider recommend for patient to be seen in the office as soon as possible or refer to the provider for follow-up. Reinforce to patient to use bariatric educational booklet as guide. It is appropriate to refer patient to the nutritionist to discuss more in detail of diet and nutrition.

## 2021-08-31 ENCOUNTER — TELEPHONE (OUTPATIENT)
Dept: SURGERY | Age: 41
End: 2021-08-31

## 2021-08-31 NOTE — TELEPHONE ENCOUNTER
Returned call to Ms Mackenzie verified all [address]. Reviewed notes patient had Laparoscopic sleeve gastrectomy with esophagogastroduodenoscopy on 8/24/21. Patient states she wore a support girdle. She didn't want the top of the girdle to rub against the incision so she applied a bandage to protect it. She took the bandage off and the top incision felt a little mushy. There is no redness swelling or drainage. She wants to know if she should be concerned. I advised to keep an eye on the area. If she wants to protect the area when wearing a girdle apply dry 4x4 instead no tape.      Post Op follow up scheduled for 9/10/21 with Kierra Moreno.

## 2021-08-31 NOTE — TELEPHONE ENCOUNTER
Patient called requesting to speak with a nurse regarding one of her incision sites from her recent surgery.    CB: [phone]

## 2021-09-10 ENCOUNTER — OFFICE VISIT (OUTPATIENT)
Dept: SURGERY | Age: 41
End: 2021-09-10
Payer: COMMERCIAL

## 2021-09-10 VITALS
TEMPERATURE: 98.5 F | WEIGHT: 228.5 LBS | BODY MASS INDEX: 36.72 KG/M2 | HEART RATE: 68 BPM | SYSTOLIC BLOOD PRESSURE: 120 MMHG | DIASTOLIC BLOOD PRESSURE: 79 MMHG | OXYGEN SATURATION: 96 % | HEIGHT: 66 IN | RESPIRATION RATE: 20 BRPM

## 2021-09-10 DIAGNOSIS — Z09 FOLLOW-UP EXAMINATION AFTER ABDOMINAL SURGERY: Primary | ICD-10-CM

## 2021-09-10 DIAGNOSIS — E66.01 MORBID OBESITY (HCC): ICD-10-CM

## 2021-09-10 PROCEDURE — 99024 POSTOP FOLLOW-UP VISIT: CPT | Performed by: NURSE PRACTITIONER

## 2021-09-10 RX ORDER — MULTIVIT-MIN/IRON/FOLIC ACID/K 45-800-120
CAPSULE ORAL DAILY
COMMUNITY

## 2021-09-10 NOTE — PROGRESS NOTES
Chief Complaint   Patient presents with    Surgical Follow-up     2 1/2 weeks s/p lap sleeve gastrectomy down 18 pounds       [address] [place] [address] is 2 weeks status post sleeve gastrectomy. Presents today for obesity management. Patient has lost 18 lbs. Since surgery. Patient is satisfied with progress. Patient is consuming 60 grams of protein daily. Patient is drinking 48+ oz of fluids per day. Bowels moving every other day  Using Miralax   Nausea  no  Regurgitation  no  No fever or chills, chest pain or shortness of breath. Vitamin compliance Yes Opurity vits   Activity  Walking every day   Sleep   Good   Minimal right sided abdominal pain and not taking any meds   Taking PPI as directed   Physical Exam  Visit Vitals  /79   Pulse 68   Temp 98.5 °F (36.9 °C)   Resp 20   Ht 5' 5.5\" (1.664 m)   Wt 228 lb 8 oz (103.6 kg)   SpO2 96%   BMI 37.45 kg/m²     A + O x 3  Chest  CTA, unlabored   COR  RRR  ABD Soft, obese, lap sites C/D/I ; NT/ND, no masses or hernias   EXT No edema; ambulating independently       ICD-10-CM ICD-9-CM    1. Follow-up examination after abdominal surgery  Z09 V67.09    2. Morbid obesity ([address]  E66.01 278.01    3. BMI 37.0-37.9, adult  Z68.37 V85.37        Fabienne PERSAUD [place] is 2 weeks  s/p sleeve gastrectomy doing well   Diet soft stage I   Continue vitamins and protein supplements   Activity daily walking 10 minutes tid and may get in a pool   Sleep hygiene reviewed   Follow-up in 2 weeks   Return to work at 4 weeks   Support group  [address] [place] [address] verbalized understanding and questions were answered to the best of my knowledge and ability. Diet, activity and mindfulness educational materials were provided. 25 minutes spent in face to face with Fabienne PERSAUD [place] > 50% counseling.

## 2021-09-10 NOTE — PATIENT INSTRUCTIONS
Continue to use your protein powder and or shakes every day to meet the 60 grams / day protein goal (minimum)    Liquids are still a priority and aim for at least 64 oz water or other approved clear liquid every day     In addition to everything on the Bariatric Liquid diet, you may   add these foods to your diet. Protein - include with every meal   Egg or egg substitute     Low fat or fat-free cottage cheese     Low fat or fat-free yogurt    Low fat Greek yogurt    Fat-free, 1% milk, or Lactaid milk    Low-fat or vegetarian refried beans    Well-cooked beans and lentils   Fat-free or 2% reduced-fat cheese    Hummus    Low fat soup     Snacks/Other Options:   Sugar free fudgsicles    Sugar free cocoa    No sugar added pudding     Fruits and Vegetables   Applesauce (no sugar added)   Canned fruit (no sugar added)   Fresh soft peeled fruits (melons, banana, avocado, berries)   Any soft cooked vegetables    Mashed potatoes, Sweet potatoes, baked potatoes (no skin)    Condiments   Fat free non-stick spray   Herbs and spices   Lite butter, margarine, canola oil, olive oil   Reduced-fat or fat-free hwang   Reduced-fat or fat-free salad dressing   Reduced-fat or fat-free cream cheese   Reduced-fat or fat-free sour cream   Lemon juice   Salt, pepper, mustard, ketchup, salsa    Prepare food to the appropriate texture. Sample Meal Plan:  Soft and Mushy    Breakfast ½ cup plain oatmeal with protein powder. Add cinnamon, nutmeg, Splenda brown sugar as desired for flavor 20-25 grams protein   Snack (optional) High protein gelatin (recipe on www.[employer]. com) 10 grams protein   Lunch ½ cup low fat cottage cheese or [place] yogurt with soft fruit 10 - 15 grams protein    Snack (optional) High protein pudding or high protein popsicle (recipe on www.[employer]. com) 10 grams protein   Dinner ¼ cup low-fat well cooked beans with low-fat cheese sprinkled on top  ¼ cup no sugar added applesauce (can sprinkle protein powder) 5-8 grams protein  510  grams protein     Key Points   Continue to drink 48-64 ounces of low calorie, non-carbonated, sugar free beverages between meals.  Eat 3 meals per day   Measure each meal to HALF cup per meal   Aim for 60 grams of protein every day. Try food sources of protein first.    Continue to supplement with protein shakes/powder to meet protein goals.  Take small bites. Try eating with smaller utensils (baby spoon, cocktail fork).  Chew food thoroughly   Allow about 30 minutes to eat a meal.  Eating too fast may cause nausea or vomiting.  Stop eating as soon as you feel full. Overeating may stretch your stomach's capacity and prevent desired weight loss.  Do not drink liquids during meals and 30 minutes after meals. Drinking with meals may cause nausea or vomiting.  Add one new food at a time   Take vitamins daily   No lifting greater than 20 lbs.  You can do light jogging and walking.  You may go into a pool. What to do if you are constipated: You may  take Milk of Magnesia. Take 2 Tablespoons followed by 16 oz of water then 2 hours later take another 2 tablespoons. If  milk of magnesia does not work then take [place] or Miralax over the counter. Keep in mind that the Benefiber or Miralax may take a day or two to work. If all of the above do not work try a Fleets enema and follow the directions on the box. If you are not able to tolerate liquids or soft foods. Please call our office  [phone]  If you have vomiting and persistent epigastric pain or chest pain. You should call our office, the doctor on-call or go to the emergency room.

## 2021-09-10 NOTE — PROGRESS NOTES
1. Have you been to the ER, urgent care clinic since your last visit? Hospitalized since your last visit? No    2. Have you seen or consulted any other health care providers outside of the [address] [place] [address] since your last visit? Include any pap smears or colon screening.  No

## 2021-09-14 ENCOUNTER — TELEPHONE (OUTPATIENT)
Dept: SURGERY | Age: 41
End: 2021-09-14

## 2021-09-14 NOTE — TELEPHONE ENCOUNTER
Bariatric Post-Operative Phone Calls: Week 3    Diet:Question of any nausea and/or vomiting. Question of tolerance to diet advancement from liquids to solids. Protein intake (goal is 60 grams of protein daily)   Poor____Fair____Good_X___Great____     Comment:_No c/o N/V consumes 60 gms protein shake a day eating eggs, potatoes, chicken noodle soups, refried beans, cabbage, broccoli _____________________________________________________________      ______________________________________________________________________    Hydration:Less than 32 ounces of water daily is fair to poor (Goal is 64 ounces per day)   Poor____ Fair____ Good_X___Great____    Comment:_Consumes 48 oz water a day_____________________________________________________________    ______________________________________________________________________      Ambulation:( walking at least 3 x week, for at least 30 minutes)   Poor______ Fair______ Good_X_____     Great______ Comment:_Walking 3 times a week 30 min_________________________________________________    ______________________________________________________________________      Urine Color: Question of any odor and color(should be angela, pale, and clear) Dark______ Amber______ Pale______      Clear__X____ Comment:__No issues voiding_________________________________________________                           ________________________________________________________________    Bowel movements: Question of any constipation- haven't had any bowel movements for more than 3 days. This could be related to protein intake and/or narcotic pain medication usage. Comment:                                                     BM has been good up until past few days had to take the miralex last BM 2 days ago. I advised if still no BM in 3 days, to take the miralex 2 times a day and or take senna tab. Also continue to work on fluid intake and walking is always good. Pain: Left sided abdominal pain is normal (should be less than 3)         Question if pain medication is helpful. 10___ 9___ 8___ 7___ 6___ 5___ 4___ 3___     2___1___0_X__Comment:_________________________________________________    ______________________________________________________________________      Incision: (No redness, pain, swelling or fever) Healing Well_X_____     Healed______Redness_________ Pain_________     Swelling_________ Fever__________(greater than 101 needs evaluation)    Comment:_No redness swelling or drainage___________________________________________________________    ______________________________________________________________________  Use of incentive spirometer: Yes____       No X          Next Appointment:_9/24 at 11AM_____________                 Support Group: Yes_X_____No______    Additional Comments:____________________________________________________________    ____________________________________________________________________      If more than one parameter is not met or considered poor, nurse needs to discuss with provider recommend for patient to be seen in the office as soon as possible or refer to the provider for follow-up. Reinforce to patient to use bariatric educational booklet as guide. It is appropriate to refer patient to the nutritionist to discuss more in detail of diet and nutrition.

## 2021-09-24 ENCOUNTER — VIRTUAL VISIT (OUTPATIENT)
Dept: SURGERY | Age: 41
End: 2021-09-24
Payer: COMMERCIAL

## 2021-09-24 VITALS — BODY MASS INDEX: 36.56 KG/M2 | HEIGHT: 66 IN | WEIGHT: 227.5 LBS

## 2021-09-24 DIAGNOSIS — Z09 FOLLOW-UP EXAMINATION AFTER ABDOMINAL SURGERY: Primary | ICD-10-CM

## 2021-09-24 DIAGNOSIS — E66.01 MORBID OBESITY (HCC): ICD-10-CM

## 2021-09-24 DIAGNOSIS — K59.01 SLOW TRANSIT CONSTIPATION: ICD-10-CM

## 2021-09-24 PROCEDURE — 99024 POSTOP FOLLOW-UP VISIT: CPT | Performed by: NURSE PRACTITIONER

## 2021-09-24 NOTE — LETTER
NOTIFICATION OF RETURN TO WORK / SCHOOL    9/24/2021 2:57 PM    Ms. [facility] [employer]  [phone]  . To Whom It May Concern:    [facility] [employer] [facility] was under the care of Marianne Gutierrez from 8/24/21 to present. She will be able to return to work/school on 9/27/21 with regular duties and/or activities . If there are questions or concerns please have the patient contact our office.     Sincerely,      Coral Baker NP

## 2021-09-24 NOTE — PROGRESS NOTES
1. Have you been to the ER, urgent care clinic since your last visit? Hospitalized since your last visit? No    2. Have you seen or consulted any other health care providers outside of the [address] [place] [address] since your last visit? Include any pap smears or colon screening.  No

## 2021-09-24 NOTE — PROGRESS NOTES
I was in the office while conducting this encounter. Consent:  She and/or her healthcare decision maker is aware that this patient-initiated Telehealth encounter is a billable service, with coverage as determined by her insurance carrier. She is aware that she may receive a bill and has provided verbal consent to proceed: No - Not billable    This virtual visit was conducted via CRESCEL. Pursuant to the emergency declaration under the [facility], [facility] waiver authority and the [employer] and [employer] Act, this Virtual  Visit was conducted to reduce the patient's risk of exposure to COVID-19 and provide continuity of care for an established patient. Services were provided through a video synchronous discussion virtually to substitute for in-person clinic visit. Due to this being a TeleHealth evaluation, many elements of the physical examination are unable to be assessed. Total Time: minutes: 21-30 minutes. Chief Complaint   Patient presents with    Surgical Follow-up     s/p gastric sleeve gastrectomy down 19 poinds loss of 1 pound #[phone]     [facility] 4-week status post laparoscopic sleeve gastrectomy for treatment of morbid obesity. She is lost 19 pounds. She says is been \"easy PZ\". No fever, chills, chest pain or shortness of breath. No abdominal pain. No GERD symptoms.   Protein intake has been a little bit of a struggle she says but she still using a least 1 protein shake and then her unflavored protein powder  Water intake at least 40 ounces a day  She was having some constipation but that is improved  No abdominal pain  Returning to work on 9/27/2021  She is taking all her vitamins appears well  Speech is clear breathing is unlabored  Mucous membranes appear moist  Abdomen appears   walking for activity      Visit Vitals  Ht 5' 5.5\" (1.664 m)   Wt 227 lb 8 oz (103.2 kg)   BMI 37.28 kg/m²   Appears well  Speech is clear breathing is unlabored  Mucous membranes appear moist  Well-healed    ICD-10-CM ICD-9-CM    1. Follow-up examination after abdominal surgery  Z09 V67.09    2. Morbid obesity ([address]  E66.01 278.01    3. BMI 35.0-35.9,adult  Z68.35 V85.35    4. Slow transit constipation  K59.01 564.01      4-week status post laparoscopic sleeve gastrectomy for treatment of morbid obesity doing well  Diet advance to stage II soft adding moist meats  Continue protein supplementation and bariatric vitamins daily  Daily walking for exercise and may increase activity as tolerated  Continue with acid suppression  Plan on follow-up in 2 weeks  Return to work 9/27/2021 without restriction  [employer] verbalized understanding and questions were answered to the best of my knowledge and ability. Diet and activity  educational materials were provided.

## 2021-09-24 NOTE — PATIENT INSTRUCTIONS
Continue to use your protein powder and or shakes every day to meet the 60 grams / day protein goal (minimum)    Liquids are still a priority and aim for at least 64 oz water or other approved clear liquid every day     Moist Meats   What is this diet?  This phase adds moist meats in addition to foods in Soft & Mushy   Lean protein sources       What new foods can I eat?  Moist meat and poultry   Moist fish and seafood    Shopping Lists      Protein - include with every meal   Tuna packed in water   [employer] (canned, frozen, fresh)    White flaky fish (ashley, cod, flounder, tilapia)    Canned chicken packed in water     96-99% fat free thinly sliced deli meat (ham, turkey, chicken)    Silken Tofu    Skinless turkey or chicken (prepare to a soft texture)    Lean ground meat   Lean pork (cooked until very tender, cut into small pieces)     Sample Meal Plan:  Moist Meats    Breakfast ½ cup soft cooked eggs (2) 16 grams protein   Snack (optional) Low-fat string cheese 5 grams protein   Lunch 2 slices of lean deli turkey (2 oz.), ¼ cup soft fruit or  ½ cup tuna salad made with low-fat mayonnaise 14 grams protein   14 grams protein   Snack (optional) Greek yogurt or low-fat cottage cheese or low-fat string cheese 8-15 grams protein   Dinner Soft/flaky fish (2 oz.)  ¼ cup soft cooked vegetables 14 grams protein       Key Points     AVOID REHEATING meats as it gets too dry    Continue to drink 48-64 ounces of low calorie, non-carbonated, sugar free beverages between meals.  Eat 3 meals per day   Measure each meal to HALF cup per meal   Aim for 60 grams of protein every day. Try food sources of protein first.    Continue to supplement with protein shakes/powder to meet protein goals.  Take small bites. Try eating with smaller utensils (baby spoon, cocktail fork).  Chew food thoroughly   Allow about 30 minutes to eat a meal.  Eating too fast may cause nausea or vomiting.    Stop eating as soon as you feel full. Overeating may stretch your stomach's capacity and prevent desired weight loss.  Do not drink liquids during meals and 30 minutes after meals. Drinking with meals may cause nausea or vomiting.  Add one new food at a time   Take vitamins daily   No lifting greater than 40 lbs.  You can do light jogging and walking.  You may go into a pool. What to do if you are constipated: You may  take Milk of Magnesia. Take 2 Tablespoons followed by 16 oz of water then 2 hours later take another 2 tablespoons. If  milk of magnesia does not work then take [employer] or Miralax over the counter. Keep in mind that the Benefiber or Miralax may take a day or two to work. If all of the above do not work try a Fleets enema and follow the directions on the box. If you are not able to tolerate liquids or soft foods. Please call our office  [phone]  If you have vomiting and persistent epigastric pain or chest pain. You should call our office, the doctor on-call or go to the emergency room. If you would like to make an appointment with one of the bariatric dietitians, please call the bariatric line at [phone]. Appointments are offered in person and virtual at no charge.

## 2021-10-07 RX ORDER — OMEPRAZOLE 40 MG/1
CAPSULE, DELAYED RELEASE ORAL
Qty: 30 CAPSULE | Refills: 1 | Status: SHIPPED | OUTPATIENT
Start: 2021-10-07 | End: 2021-10-29

## 2021-10-08 ENCOUNTER — VIRTUAL VISIT (OUTPATIENT)
Dept: SURGERY | Age: 41
End: 2021-10-08
Payer: COMMERCIAL

## 2021-10-08 DIAGNOSIS — Z09 FOLLOW-UP EXAMINATION AFTER ABDOMINAL SURGERY: Primary | ICD-10-CM

## 2021-10-08 DIAGNOSIS — E66.01 MORBID OBESITY (HCC): ICD-10-CM

## 2021-10-08 PROCEDURE — 99024 POSTOP FOLLOW-UP VISIT: CPT | Performed by: NURSE PRACTITIONER

## 2021-10-08 NOTE — PROGRESS NOTES
I was in the office while conducting this encounter. Consent:  She and/or her healthcare decision maker is aware that this patient-initiated Telehealth encounter is a billable service, with coverage as determined by her insurance carrier. She is aware that she may receive a bill and has provided verbal consent to proceed: No - Not billable    This virtual visit was conducted via GetGoing. Pursuant to the emergency declaration under the [facility] waiver authority and the [employer] Act, this Virtual  Visit was conducted to reduce the patient's risk of exposure to COVID-19 and provide continuity of care for an established patient. Services were provided through a video synchronous discussion virtually to substitute for in-person clinic visit. Due to this being a TeleHealth evaluation, many elements of the physical examination are unable to be assessed. Total Time: minutes: 21-30 minutes. Chief Complaint   Patient presents with    Post OP Follow Up     [phone] PO: 6 week follow up NL- 8/24 Lap. Sleeve Gastrectomy       Fabienne PERSAUD [place] 6-week status post laparoscopic sleeve gastrectomy for treatment of morbid obesity. She feels well. No fever, chills, chest pain or shortness of breath. No GERD symptoms. No abdominal pain. No nausea or vomiting. She is tolerating her diet. She said the other thing she cannot do because they make her nauseated or her protein shakes. She thinks her weight loss is slow but she is pleased she is gone from a size 20 to a size 16. She is lost about 25 pounds.   She is exercising by going to water aerobics 4 times a week  She has noticed increased exercises increase her hunger  Water intake is about 64 ounces per day  Protein she is trying to get in 60 but is difficult since she does not tolerate the shakes anymore  She is taking her vitamin supplements every day  She has no abdominal pain    There were no vitals taken for this visit. Appears well  Speech is clear breathing is unlabored  Mucous membranes appear moist  Abdomen is well-healed    ICD-10-CM ICD-9-CM    1. Follow-up examination after abdominal surgery  Z09 V67.09    2. Morbid obesity ([address]  E66.01 278.01    3. BMI 37.0-37.9, adult  Z68.37 V85.37      6-week status post laparoscopic sleeve gastrectomy for treatment of morbid obesity doing well  Diet advance to regular texture adding fresh fruits and vegetables  Continue with high-protein, low-carb, low-fat, no added sugar  Continue bariatric vitamins daily  Has no restrictions with regard to work  Activity as desired and continue daily exercise  Support group information provided  Follow-up 11/19/2021 at 10:40 AM virtual visit  [address] [place] [address] Drive verbalized understanding and questions were answered to the best of my knowledge and ability. Diet and activity  educational materials were provided.

## 2021-10-08 NOTE — PROGRESS NOTES
1. Have you been to the ER, urgent care clinic since your last visit? Hospitalized since your last visit? No    2. Have you seen or consulted any other health care providers outside of the [address] [place] [address] since your last visit? Include any pap smears or colon screening.  No

## 2021-10-08 NOTE — PATIENT INSTRUCTIONS
Your next appointment is 11/19/2021 at 10:40 AM. This is a virtual visit. If you do need to reschedule please call [phone]. The form for [place] employment [employer] was completed and scanned back into your chart. You should have access to the form. There was an area that required to signature from you. You have no restrictions with regard to activity/work    Solid Textured Foods  What is this diet?  This phase is a slow reintroduction of textured food, starting with those easiest tolerated   Supplement with protein shakes/powders as needed    What foods can I eat?  Lean Protein    Vegetables   Fruit   Low Fat Dairy    Choose foods wisely. Think about the nutritional benefit of the food. Protein first and at each meal.  Include produce (vegetables and/or fruits) at each meal.    Limit or eliminate \"filler\" foods such as breads, pasta, potatoes, rice, crackers, pretzels, and the like. Avoid eating and drinking together, there just isn't enough room! You may continue protein supplementation if needed to meet your daily protein goals. Many patients use a protein shake or bar to replace a meal.  There are a variety of protein supplements listed in your handbook. If you would like to make an appointment with one of the bariatric dietitians, please call the bariatric line at [phone]. Appointments are offered in person and virtual at no charge. Take your vitamins every day, attend support group and keep your regular follow-up appointments. Ultimately, success depends on you. Choose to use your tool and we will guide you along the way! Zoom Support Group   2nd Thursday of each Month from 6-7 pm   The next one is 10/14/21 6-7 pm   You can access the link at [email or web address]  Go to the Calendar tab and click on the date and it should go right to the link     Additional Resources:  Bhavin Mercado:  [email or web address] [email or web address]  Offering online support groups and pre-recorded videos  o Every Wednesday evening at 7:00 pm   [employer] Facebook page  Kekejennifer 39, Advice, and Motivation from fellow patients  Bariatric Pal: [email or web address]  BariatricPal has launched a podcast!  Hosted by Asya Bradshaw, our podcast will cover topics about obesity, pre-weight loss surgery, post-weight loss surgery, food addiction, emotional eating, myths about weight loss surgery, and more. Each episode will feature an expert in the field of weight loss and bariatric surgery who can provide a deeper insight into these topics.   [employer]:  UGE   Offering discounted exercise programs (8 Week programs, On-Demand/Virtual)   See flyer   [address] at [email or web address] or [phone]

## 2021-10-29 RX ORDER — OMEPRAZOLE 40 MG/1
CAPSULE, DELAYED RELEASE ORAL
Qty: 30 CAPSULE | Refills: 1 | Status: SHIPPED | OUTPATIENT
Start: 2021-10-29 | End: 2021-11-18 | Stop reason: SDUPTHER

## 2021-11-18 DIAGNOSIS — K21.9 GASTROESOPHAGEAL REFLUX DISEASE, UNSPECIFIED WHETHER ESOPHAGITIS PRESENT: Primary | ICD-10-CM

## 2021-11-18 RX ORDER — OMEPRAZOLE 40 MG/1
40 CAPSULE, DELAYED RELEASE ORAL DAILY
Qty: 90 CAPSULE | Refills: 2 | Status: SHIPPED | OUTPATIENT
Start: 2021-11-18 | End: 2021-11-19 | Stop reason: ALTCHOICE

## 2021-11-19 ENCOUNTER — TELEPHONE (OUTPATIENT)
Dept: SURGERY | Age: 41
End: 2021-11-19

## 2021-11-19 ENCOUNTER — VIRTUAL VISIT (OUTPATIENT)
Dept: SURGERY | Age: 41
End: 2021-11-19
Payer: COMMERCIAL

## 2021-11-19 VITALS — HEIGHT: 65 IN | BODY MASS INDEX: 35.49 KG/M2 | WEIGHT: 213 LBS

## 2021-11-19 DIAGNOSIS — E53.8 B12 DEFICIENCY: ICD-10-CM

## 2021-11-19 DIAGNOSIS — E55.9 VITAMIN D DEFICIENCY: ICD-10-CM

## 2021-11-19 DIAGNOSIS — E66.9 OBESITY, CLASS II, BMI 35-39.9, ISOLATED: ICD-10-CM

## 2021-11-19 DIAGNOSIS — Z09 FOLLOW-UP EXAMINATION AFTER ABDOMINAL SURGERY: Primary | ICD-10-CM

## 2021-11-19 DIAGNOSIS — K21.9 CHRONIC GERD: ICD-10-CM

## 2021-11-19 DIAGNOSIS — E61.1 IRON DEFICIENCY: ICD-10-CM

## 2021-11-19 PROCEDURE — 99024 POSTOP FOLLOW-UP VISIT: CPT | Performed by: NURSE PRACTITIONER

## 2021-11-19 RX ORDER — ERGOCALCIFEROL 1.25 MG/1
50000 CAPSULE ORAL
Qty: 5 CAPSULE | Refills: 3 | Status: SHIPPED | OUTPATIENT
Start: 2021-11-19 | End: 2022-06-10 | Stop reason: SDUPTHER

## 2021-11-19 NOTE — PROGRESS NOTES
I was in the office while conducting this encounter. Consent:  She and/or her healthcare decision maker is aware that this patient-initiated Telehealth encounter is a billable service, with coverage as determined by her insurance carrier. She is aware that she may receive a bill and has provided verbal consent to proceed: No - Not billable    This virtual visit was conducted via Doxy. me. Pursuant to the emergency declaration under the [facility][note 3]1 [facility], [facility] waiver authority and the [employer] Act, this Virtual  Visit was conducted to reduce the patient's risk of exposure to COVID-19 and provide continuity of care for an established patient. Services were provided through a video synchronous discussion virtually to substitute for in-person clinic visit. Due to this being a TeleHealth evaluation, many elements of the physical examination are unable to be assessed. Total Time: minutes: 11-20 minutes. Chief Complaint   Patient presents with    Post OP Follow Up     8/24/21  Laparoscopic sleeve gastrectomy   [phone]       Fabienne PERSAUD [place] 3-month status post laparoscopic sleeve gastrectomy for treatment of morbid obesity. She is lost almost 40 pounds. She is feeling well. No fever, chills, chest pain or shortness of breath. No GERD symptoms. Protein intake 50+ grams per day. She switched over to muscle milk which seems to be tolerated well. Water intake about 48 to 64 ounces  Bowels are moving most days if she gets in her water  She said most of the time she eats the way she needs to but sometimes she wants to treat. She will have an occasional sweet.   Exercisecardio 2-3 times a week and working with a  twice a week  She did her labs at her place of employment and her vitamin D level was low normal.  She says her hemoglobin was normal.  Those are not available for me to review at this time  Visit Vitals  Ht 5' 5\" (1.651 m)   Wt 213 lb (96.6 kg)   BMI 35.45 kg/m²     Appears well  Speech is clear breathing is unlabored  Mucous membranes appear moist  Abdomen is well-healed    ICD-10-CM ICD-9-CM    1. Follow-up examination after abdominal surgery  Z09 V67.09    2. Obesity, Class II, BMI 35-39.9, isolated  E66.9 278.00    3. Vitamin D deficiency  E55.9 268.9    4. B12 deficiency  E53.8 266.2    5. Iron deficiency  E61.1 280.9    6. Chronic GERD  K21.9 530.81      3-month status post laparoscopic sleeve gastrectomy for treatment of morbid obesity  Diet continue high-protein low-carb, low-fat, no added sugar  Follow the 80: 20 rule  Referred to the dietitian for continued support  Discontinue omeprazole  May use Pepcid 20 mg a day as needed for dyspepsia  I have asked her to forward her labs over to me for review  If vitamin D level is low normal would continue vitamin D once a week  Continue other bariatric vitamins daily  Support of information provided  Great job with exercise  Follow-up 02/25/2022 at 8:20 AM virtual visit  [address] [place] [address] Drive verbalized understanding and questions were answered to the best of my knowledge and ability. Diet and activity  educational materials were provided.

## 2021-11-19 NOTE — PROGRESS NOTES
1. Have you been to the ER, urgent care clinic since your last visit? Hospitalized since your last visit? No    2. Have you seen or consulted any other health care providers outside of the [address] [place] [address] since your last visit? Include any pap smears or colon screening.  No

## 2021-11-19 NOTE — PATIENT INSTRUCTIONS
Your next appointment is scheduled for 02/25/2022 at 8:20 AM.  This a virtual visit. If you do need to reschedule please call [phone]. Please send me a copy of your recent lab work for review    You should be able to upload to the patient portal or you can fax [phone]    Since you are not having reflux, you may discontinue the omeprazole    If you have some occasional indigestion, you can add Pepcid 20 mg daily if needed. You can get this over-the-counter. Great job with exercise, keep it up! Continue with your protein supplements in your vitamins daily    If you would like to make an appointment with one of the bariatric dietitians, please call the bariatric line at [phone]. Appointments are offered in person and virtual at no charge. Zoom Support Group   2nd Thursday of each Month from 6-7 pm   The next one is 12/9/21 6-7 pm   You can access the link at [email or web address]  Go to the Calendar tab and click on the date and it should go right to the link     Additional Resources:  Richard Toledo:  [email or web address]    Offering online support groups and pre-recorded videos  o Every Wednesday evening at 7:00 pm   [employer] page  Natalia 39, Advice, and Motivation from fellow patients  Bariatric Pal: [email or web address]  BariatricPal has launched a podcast!  Hosted by Barry Mancilla, our podcast will cover topics about obesity, pre-weight loss surgery, post-weight loss surgery, food addiction, emotional eating, myths about weight loss surgery, and more. Each episode will feature an expert in the field of weight loss and bariatric surgery who can provide a deeper insight into these topics.   [employer]:  Safehouse   Offering discounted exercise programs (8 Week programs, On-Demand/Virtual)   See flyer   [address] at [email or web address] or [phone]      Happy Thanksgiving Juli Fulton

## 2022-01-04 ENCOUNTER — TELEPHONE (OUTPATIENT)
Dept: INTERNAL MEDICINE CLINIC | Age: 42
End: 2022-01-04

## 2022-01-12 NOTE — TELEPHONE ENCOUNTER
Contacted patient. She states that she has been in quarantine since 01/03/22 and she did test positive a few days ago. Recommended vitamin c, zinc, and rest. She stated understanding.

## 2022-02-25 ENCOUNTER — VIRTUAL VISIT (OUTPATIENT)
Dept: SURGERY | Age: 42
End: 2022-02-25
Payer: COMMERCIAL

## 2022-02-25 VITALS — WEIGHT: 200 LBS | BODY MASS INDEX: 33.32 KG/M2 | HEIGHT: 65 IN

## 2022-02-25 DIAGNOSIS — E55.9 VITAMIN D DEFICIENCY: ICD-10-CM

## 2022-02-25 DIAGNOSIS — E61.1 IRON DEFICIENCY: ICD-10-CM

## 2022-02-25 DIAGNOSIS — E53.8 B12 DEFICIENCY: ICD-10-CM

## 2022-02-25 DIAGNOSIS — R73.02 IGT (IMPAIRED GLUCOSE TOLERANCE): ICD-10-CM

## 2022-02-25 DIAGNOSIS — E78.5 DYSLIPIDEMIA: ICD-10-CM

## 2022-02-25 DIAGNOSIS — E28.2 PCOS (POLYCYSTIC OVARIAN SYNDROME): ICD-10-CM

## 2022-02-25 DIAGNOSIS — E66.9 OBESITY (BMI 30-39.9): Primary | ICD-10-CM

## 2022-02-25 PROCEDURE — 99213 OFFICE O/P EST LOW 20 MIN: CPT | Performed by: NURSE PRACTITIONER

## 2022-02-25 NOTE — PATIENT INSTRUCTIONS
Your next appointment is 6/3/2022 at 8 AM.  This is a virtual visit. If you do need to reschedule please call [phone]. I placed a lab order to recheck your labs / vitamins. You can go to any [address] [place], [place] [address]vd can access the order via [address] (the patient portal) and I have also placed a paper copy in the mail. Please get your labs checked prior to your next appointment. For locations, visit [email or web address]     If you would like to make an appointment with one of the bariatric dietitians, please call the bariatric line at [phone]. Appointments are offered in person and virtual at no charge. Be very mindful with eating out. If you order a regular entrée half of it. Stay for another meal.    Consistency is key! Consistency with exercise, sleep and healthy diet. Aim for 20 g of protein per meal (lean, quality protein) and vegetables    Drink mostly water and aim for 2 L/day    Sleep is very important with regard to obesity management. Aim for at least 7 hours minimum per night. Support Group Calendar 2022        Please join us for [facility] virtual support group. This monthly meeting is designed to help provide you additional support during your weight loss journey. These sessions are open to all patients of the [facility] including surgical and non-surgical weight loss patients. Support Group meets the 2nd Thursday of every month from 6:00  7:00 pm   Sign up is not required    Date  Topic  Facilitator  Presenter    January 13  David [facility] Coordinator  Miguel Johns    February 10  Exercise:  How to Get Started  Kaylene Kenyon RD  [facility] SERVICES    March 10  Healthy Meal Planning  ROMY Ross [address] , Miguel    April 14  Q&A Panel with the Medical Weight Loss Physicians  Kayden ABDUL [facility] Coordinator  Bariatricians    May 12  Behavior: [address]  Yamila Wild MD    June 9  Exercising Through a [facility], [address], [address] [facility] Sports Performance    July 14  Mindful and Healthy Snacking  [place], [place]  Miguel Barrera    August 11  Q&A Panel with the Bariatric Surgeons  [facility] Coordinator  Bariatric Surgeons    MD Dennis Fernández MD    September 8  Stress Management  How to [address], RN  Clinical   Dinesh Pertty PsyD Licensed Clinical Psychologist    October 13  Moving Exercise [place], [place]    November 10  Healthy Holiday Cooking  Miguel Barrera  Media Miguel Lopez    December 8  Celebrating Success  Patient Panel  KULWINDER OKEEFE OF MARGARETTE SMITH    Patient Volunteers      Zoom link to join: [email or web address] [email or web address]. [email or web address]   Or join by phone: Charles Duffy [phone] and enter Meeting  [phone]   Questions about Support Group  email [email or web address]

## 2022-02-25 NOTE — PROGRESS NOTES
I was in the office while conducting this encounter. Consent:  She and/or her healthcare decision maker is aware that this patient-initiated Telehealth encounter is a billable service, with coverage as determined by her insurance carrier. She is aware that she may receive a bill and has provided verbal consent to proceed: Yes    This virtual visit was conducted via [employer]. Pursuant to the emergency declaration under the [facility], [facility] waiver authority and the [employer] and [employer] Act, this Virtual  Visit was conducted to reduce the patient's risk of exposure to COVID-19 and provide continuity of care for an established patient. Services were provided through a video synchronous discussion virtually to substitute for in-person clinic visit. Due to this being a TeleHealth evaluation, many elements of the physical examination are unable to be assessed. Total Time: minutes: 11-20 minutes. Chief Complaint   Patient presents with    Morbid Obesity     [phone]//Pt reported weight 200lb//Down 13lbs since last visit//VVOV: EP LSG- Follow up from 11/19/2021       Fabienne Greer 6-month status post laparoscopic sleeve gastrectomy for treatment of morbid obesity. She is doing well. In general she feels well. She is concerned about the status of her PCOS. She said she is not had labs in more than 6 months. She reports her menses are more regular. She has had some male pattern hair loss. She had no recent acne breakouts. She is also inquiring about going back on the Metformin. Prior to surgery she was on 2000 mg a day. She said she has no more obstructive sleep apnea and sleeps well and does not snore  She no longer has pain in her hands or wrists  She is said she is moving better and has been working out with a   Diet \"could be better sometimes\".   She does eat out frequently usually for breakfast and lunch. Says her portions remain small and she is mindful about what she orders. She has no issues with reflux  No abdominal pain  No nausea or vomiting  Fluids drinking mostly unsweetened tea  Bowels are regular  She is taking all of her bariatric vitamin supplements bariatric multi with iron, weekly vitamin D and bariatric calcium with D. She is also added biotin  She is requesting \"PCOS labs\" along with her vitamin levels    Preop weight 250 pounds  Current weight 200 pounds    Co-Morbid(s)     Was anti coagulation initiated for presumed / confirmed DVT/PE? NO    Was an incisional hernia noted on exam?       NO      COMORBIDITY     SLEEP APNEA                 NO        GERD  (req.meds)           NO  HYPERLIPIDEMIA           NO  HYPERTENSION             NO       IF YES, # OF HTN MEDICATIONS 0  DIABETES                        NO      IF YES, 0 NON-INSULIN   0 INSULIN     Visit Vitals  Ht 5' 5\" (1.651 m)   Wt 200 lb (90.7 kg)   BMI 33.28 kg/m²     Appears well  Speech is clear breathing is unlabored  Mucous membranes appear moist      ICD-10-CM ICD-9-CM    1. Obesity (BMI 30-39. 9)  E66.9 278.00 CBC W/O DIFF      IRON PROFILE      VITAMIN B12 & FOLATE      VITAMIN D, 25 HYDROXY      METABOLIC PANEL, COMPREHENSIVE      HEMOGLOBIN A1C WITH EAG      TESTOSTERONE, TOTAL, FEMALE/CHILD      TSH 3RD GENERATION      FSH AND LH      LIPID PANEL      CBC W/O DIFF      IRON PROFILE      VITAMIN B12 & FOLATE      VITAMIN D, 25 HYDROXY      METABOLIC PANEL, COMPREHENSIVE      HEMOGLOBIN A1C WITH EAG      TESTOSTERONE, TOTAL, FEMALE/CHILD      TSH 3RD GENERATION      FSH AND LH      LIPID PANEL   2. BMI 33.0-33.9,adult  Z68.33 V85.33    3. PCOS (polycystic ovarian syndrome)  E28.2 256.4 HEMOGLOBIN A1C WITH EAG      TESTOSTERONE, TOTAL, FEMALE/CHILD      TSH 3RD GENERATION      FSH AND LH      HEMOGLOBIN A1C WITH EAG      TESTOSTERONE, TOTAL, FEMALE/CHILD      TSH 3RD GENERATION      FSH AND LH   4.  IGT (impaired glucose tolerance)  R73.02 790.22 HEMOGLOBIN A1C WITH EAG      HEMOGLOBIN A1C WITH EAG   5. Vitamin D deficiency  E55.9 268.9 VITAMIN D, 25 HYDROXY      VITAMIN D, 25 HYDROXY   6. Iron deficiency  E61.1 280.9 IRON PROFILE      IRON PROFILE   7. B12 deficiency  E53.8 266.2 VITAMIN B12 & FOLATE      VITAMIN B12 & FOLATE   8. Dyslipidemia  E78.5 272.4 LIPID PANEL      LIPID PANEL     6-month status post laparoscopic sleeve gastrectomy for treatment of morbid obesity  50% excess weight loss  Nutrition, vitamin and metabolic labs ordered I did advise her that if there were any abnormalities with regard to PCOS/hormone levels I would defer to endocrine for any additional recommendations  Pending labs consider resuming Metformin  I encouraged her to follow-up with a dietitian with regard to staying on track and some accountability  We did talk about eating out and making better choices with regard to breakfast and lunch meals  Continue with regular exercise regimen  Continue with bariatric vitamins  Follow-up 6/3/2022 at 8 AM a virtual visit  Support of information provided  Frederick Joseph verbalized understanding and questions were answered to the best of my knowledge and ability. Labs, diet and activity  educational materials were provided.

## 2022-02-25 NOTE — PROGRESS NOTES
1. Have you been to the ER, urgent care clinic since your last visit? Hospitalized since your last visit? No    2. Have you seen or consulted any other health care providers outside of the [address] [place] [address] since your last visit? Include any pap smears or colon screening.  No

## 2022-03-04 ENCOUNTER — DOCUMENTATION ONLY (OUTPATIENT)
Dept: SURGERY | Age: 42
End: 2022-03-04

## 2022-03-15 LAB
25(OH)D3+25(OH)D2 SERPL-MCNC: 36.3 NG/ML (ref 30–100)
ALBUMIN SERPL-MCNC: 4.3 G/DL (ref 3.8–4.8)
ALBUMIN/GLOB SERPL: 1.5 {RATIO} (ref 1.2–2.2)
ALP SERPL-CCNC: 109 IU/L (ref 44–121)
ALT SERPL-CCNC: 19 IU/L (ref 0–32)
AST SERPL-CCNC: 17 IU/L (ref 0–40)
BILIRUB SERPL-MCNC: 0.7 MG/DL (ref 0–1.2)
BUN SERPL-MCNC: 8 MG/DL (ref 6–20)
BUN/CREAT SERPL: 10 (ref 9–23)
CALCIUM SERPL-MCNC: 9.7 MG/DL (ref 8.7–10.2)
CHLORIDE SERPL-SCNC: 104 MMOL/L (ref 96–106)
CHOLEST SERPL-MCNC: 148 MG/DL (ref 100–199)
CO2 SERPL-SCNC: 23 MMOL/L (ref 20–29)
CREAT SERPL-MCNC: 0.82 MG/DL (ref 0.57–1)
EGFR: 93 ML/MIN/1.73
ERYTHROCYTE [DISTWIDTH] IN BLOOD BY AUTOMATED COUNT: 12.3 % (ref 11.7–15.4)
EST. AVERAGE GLUCOSE BLD GHB EST-MCNC: 103 MG/DL
FOLATE SERPL-MCNC: 11.8 NG/ML
FSH SERPL-ACNC: 5.8 MIU/ML
GLOBULIN SER CALC-MCNC: 2.8 G/DL (ref 1.5–4.5)
GLUCOSE SERPL-MCNC: 84 MG/DL (ref 65–99)
HBA1C MFR BLD: 5.2 % (ref 4.8–5.6)
HCT VFR BLD AUTO: 41.2 % (ref 34–46.6)
HDLC SERPL-MCNC: 47 MG/DL
HGB BLD-MCNC: 13.4 G/DL (ref 11.1–15.9)
IRON SATN MFR SERPL: 47 % (ref 15–55)
IRON SERPL-MCNC: 121 UG/DL (ref 27–159)
LDLC SERPL CALC-MCNC: 84 MG/DL (ref 0–99)
LH SERPL-ACNC: 14.4 MIU/ML
MCH RBC QN AUTO: 29.9 PG (ref 26.6–33)
MCHC RBC AUTO-ENTMCNC: 32.5 G/DL (ref 31.5–35.7)
MCV RBC AUTO: 92 FL (ref 79–97)
PLATELET # BLD AUTO: 431 X10E3/UL (ref 150–450)
POTASSIUM SERPL-SCNC: 4.6 MMOL/L (ref 3.5–5.2)
PROT SERPL-MCNC: 7.1 G/DL (ref 6–8.5)
RBC # BLD AUTO: 4.48 X10E6/UL (ref 3.77–5.28)
SODIUM SERPL-SCNC: 140 MMOL/L (ref 134–144)
TESTOST SERPL-MCNC: 32.2 NG/DL (ref 10–55)
TIBC SERPL-MCNC: 258 UG/DL (ref 250–450)
TRIGL SERPL-MCNC: 87 MG/DL (ref 0–149)
TSH SERPL DL<=0.005 MIU/L-ACNC: 0.99 UIU/ML (ref 0.45–4.5)
UIBC SERPL-MCNC: 137 UG/DL (ref 131–425)
VIT B12 SERPL-MCNC: >2000 PG/ML (ref 232–1245)
VLDLC SERPL CALC-MCNC: 17 MG/DL (ref 5–40)
WBC # BLD AUTO: 8.6 X10E3/UL (ref 3.4–10.8)

## 2022-03-18 PROBLEM — E66.01 MORBID OBESITY WITH BMI OF 40.0-44.9, ADULT (HCC): Status: ACTIVE | Noted: 2021-08-24

## 2022-03-19 PROBLEM — M25.532 PAIN IN BOTH WRISTS: Status: ACTIVE | Noted: 2020-12-03

## 2022-03-19 PROBLEM — E66.01 MORBID OBESITY (HCC): Status: ACTIVE | Noted: 2020-03-12

## 2022-03-19 PROBLEM — Z91.148 H/O MEDICATION NONCOMPLIANCE: Status: ACTIVE | Noted: 2020-12-03

## 2022-03-19 PROBLEM — M25.531 PAIN IN BOTH WRISTS: Status: ACTIVE | Noted: 2020-12-03

## 2022-03-19 PROBLEM — G47.30 SLEEP APNEA: Status: ACTIVE | Noted: 2020-12-17

## 2022-03-20 PROBLEM — M25.542 ARTHRALGIA OF BOTH HANDS: Status: ACTIVE | Noted: 2020-12-03

## 2022-03-20 PROBLEM — M25.541 ARTHRALGIA OF BOTH HANDS: Status: ACTIVE | Noted: 2020-12-03

## 2022-03-22 ENCOUNTER — PATIENT MESSAGE (OUTPATIENT)
Dept: SURGERY | Age: 42
End: 2022-03-22

## 2022-03-22 NOTE — TELEPHONE ENCOUNTER
From: Calion Senate  To: Suni Luu NP  Sent: 3/22/2022 1:57 PM EDT  Subject: Lab Results    Please see attached

## 2022-06-10 ENCOUNTER — VIRTUAL VISIT (OUTPATIENT)
Dept: SURGERY | Age: 42
End: 2022-06-10
Payer: COMMERCIAL

## 2022-06-10 VITALS — HEIGHT: 65 IN | BODY MASS INDEX: 32.99 KG/M2 | WEIGHT: 198 LBS

## 2022-06-10 DIAGNOSIS — Z90.3 S/P GASTRIC SLEEVE PROCEDURE: ICD-10-CM

## 2022-06-10 DIAGNOSIS — E66.9 OBESITY (BMI 30.0-34.9): Primary | ICD-10-CM

## 2022-06-10 DIAGNOSIS — E55.9 VITAMIN D DEFICIENCY: ICD-10-CM

## 2022-06-10 PROCEDURE — 99213 OFFICE O/P EST LOW 20 MIN: CPT | Performed by: NURSE PRACTITIONER

## 2022-06-10 RX ORDER — ERGOCALCIFEROL 1.25 MG/1
50000 CAPSULE ORAL
Qty: 5 CAPSULE | Refills: 3 | Status: SHIPPED | OUTPATIENT
Start: 2022-06-10

## 2022-06-10 NOTE — PROGRESS NOTES
I was in the office while conducting this encounter. Consent:  He and/or his healthcare decision maker is aware that this patient-initiated Telehealth encounter is a billable service, with coverage as determined by his insurance carrier. He is aware that he may receive a bill and has provided verbal consent to proceed: Yes    This virtual visit was conducted via Doxy. me. Pursuant to the emergency declaration under the [facility], 1135 waiver authority and the [employer] and [employer] Act, this Virtual  Visit was conducted to reduce the patient's risk of exposure to COVID-19 and provide continuity of care for an established patient. Services were provided through a video synchronous discussion virtually to substitute for in-person clinic visit. Due to this being a TeleHealth evaluation, many elements of the physical examination are unable to be assessed. Total Time: minutes: 11-20 minutes. [address], was evaluated through a synchronous (real-time) audio-video encounter. The patient (or guardian if applicable) is aware that this is a billable service, which includes applicable co-pays. This Virtual Visit was conducted with patient's (and/or legal guardian's) consent. The visit was conducted pursuant to the emergency declaration under the [address] [place] [address], [facility] waiver authority and the [employer] and [employer] Act. Patient identification was verified, and a caregiver was present when appropriate. The patient was located in a state where the provider was licensed to provide care. Chief Complaint   Patient presents with    Morbid Obesity     10 months s/p sleeve. Down 2lbs [phone]       [facility] 10 months s/p Sleeve gastrectomy for treatment of morbid obesity. Doing well and is pleased with weight.     Has not been < 200 lbs in \"forever\". No GERD   No abdominal pain   No nausea or vomiting   Taking vitamins 4/7 days due to \"busy life\"   Working 2 jobs and back in school   Needs refill on D   Exercising with a  and tracking intake   Feels great     Pre op weight 250 lbs   Current  198 lbs     Co-Morbid(s)     Was anti coagulation initiated for presumed / confirmed DVT/PE? NO    Was an incisional hernia noted on exam?       NO      COMORBIDITY     SLEEP APNEA                 NO        GERD  (req.meds)           NO  HYPERLIPIDEMIA           NO  HYPERTENSION             NO       IF YES, # OF HTN MEDICATIONS 0  DIABETES                        NO      IF YES, 0 NON-INSULIN   0 INSULIN     Visit Vitals  Ht 5' 5\" (1.651 m)   Wt 198 lb (89.8 kg)   BMI 32.95 kg/m²     Looks well   Speech clear and breathing unlabored     ICD-10-CM ICD-9-CM    1. Obesity (BMI 30.0-34. 9)  E66.9 278.00    2. Vitamin D deficiency  E55.9 268.9 ergocalciferol (ERGOCALCIFEROL) 1,250 mcg (50,000 unit) capsule   3. S/P gastric sleeve procedure  Z90.3 V45.75      10 months s/p Sleeve gastrectomy for treatment of morbid obesity   55% excess weight loss   Refilled D weekly and continue other bariatric vitamins   Reviewed protein goals and fluid intake   Self care and importance of quality sleep and exercise to reduce stress   RD available   Follow up 8/19/22 at 8:40 am [employer] verbalized understanding and questions were answered to the best of my knowledge and ability. self care diet and activity  educational materials were provided.

## 2022-06-10 NOTE — PATIENT INSTRUCTIONS
NEXT APPOINTMENT 8/19/22 at 8:40 am Virtual with SHREYA Zamudio     If you would like to make an appointment with one of the bariatric dietitians, please call the bariatric line at [phone]. Appointments are offered in person and virtual at no charge. Stay on your daily vitamins and I refilled the weekly D     You have lost 55% of your excess weight, which is awesome! This is expected weight loss with the sleeve. Doesn't mean you cannot lose more weight; however, I encourage you to focus on fitness goals, healthy eating, quality sleep and daily self care. Support Group Calendar 2022        Please join us for [employer] [facility] virtual support group. This monthly meeting is designed to help provide you additional support during your weight loss journey. These sessions are open to all patients of the [facility] including surgical and non-surgical weight loss patients. Support Group meets the 2nd Thursday of every month from 6:00  7:00 pm   Sign up is not required    Date  Topic  Facilitator  Presenter    January 13  David Brennan [facility] Coordinator  Miguel Garnett    February 10  Exercise:  How to Get Started  Timmy Srivastava RD  [facility] SERVICES    March 10  Healthy Meal Planning  Clint Canchola NP  [facility]    April 14  Q&A Panel with the Medical Weight Loss Physicians  Abilio Schumacher FRANKO [facility] Coordinator  Bariatricians    May 12  Behavior: [address]    Kirstin Bhat MD    June 9  Exercising Through a [address] [place], [place], [address] Sports Performance    July 14  Mindful and Healthy Snacking  Herrera Bell [place]    August 11  Q&A Panel with the Bariatric Surgeons  [facility] Coordinator  Bariatric Surgeons    MD Julianne Murillo MD    September 8  Stress Management  How to [address]  Clinical   Adrienne Guajardo PsyD Licensed Clinical Psychologist    October 13  Moving Exercise Indoors  [place], [place]    November 10  Healthy Holiday Cooking  Miguel Dominguez [place]    December 8  Celebrating Success  Patient Panel  KULWINDER OKEEFE OF [facility]    Patient Volunteers      Zoom link to join: [email or web address]   Or join by phone: Connie Persaud [phone] and enter Meeting  [phone]   Questions about Support Group  email [email or web address]

## 2022-08-28 NOTE — LETTER
1/15/2021    Patient: Coretta Velez   YOB: 1980   Date of Visit: 1/8/2021     Srinivas Luu MD  [place] [address]  [address] [employer]    Dear Srinivas Luu MD,      Thank you for referring Ms. Fabienne Mackenzie to [address] [place] [address] for evaluation. My notes for this consultation are attached. If you have questions, please do not hesitate to call me. I look forward to following your patient along with you.       Sincerely,    Robb Lozoya MD 29-Aug-2022 02:29

## 2022-09-02 ENCOUNTER — VIRTUAL VISIT (OUTPATIENT)
Dept: SURGERY | Age: 42
End: 2022-09-02
Payer: COMMERCIAL

## 2022-09-02 VITALS — BODY MASS INDEX: 31.62 KG/M2 | WEIGHT: 190 LBS

## 2022-09-02 DIAGNOSIS — Z90.3 S/P GASTRIC SLEEVE PROCEDURE: ICD-10-CM

## 2022-09-02 DIAGNOSIS — E66.9 OBESITY (BMI 30-39.9): Primary | ICD-10-CM

## 2022-09-02 PROCEDURE — 99213 OFFICE O/P EST LOW 20 MIN: CPT | Performed by: NURSE PRACTITIONER

## 2022-09-02 NOTE — PROGRESS NOTES
1. Have you been to the ER, urgent care clinic since your last visit? Hospitalized since your last visit? No    2. Have you seen or consulted any other health care providers outside of the [address] [place] [address] since your last visit? Include any pap smears or colon screening.  No

## 2022-09-02 NOTE — PROGRESS NOTES
I was in the office while conducting this encounter. Consent:  He and/or his healthcare decision maker is aware that this patient-initiated Telehealth encounter is a billable service, with coverage as determined by his insurance carrier. He is aware that he may receive a bill and has provided verbal consent to proceed: Yes    This virtual visit was conducted via Doxy. me. Pursuant to the emergency declaration under the [facility], 1135 waiver authority and the [employer] Act, this Virtual  Visit was conducted to reduce the patient's risk of exposure to COVID-19 and provide continuity of care for an established patient. Services were provided through a video synchronous discussion virtually to substitute for in-person clinic visit. Due to this being a TeleHealth evaluation, many elements of the physical examination are unable to be assessed. Total Time: minutes: 11-20 minutes. [address], was evaluated through a synchronous (real-time) audio-video encounter. The patient (or guardian if applicable) is aware that this is a billable service, which includes applicable co-pays. This Virtual Visit was conducted with patient's (and/or legal guardian's) consent. The visit was conducted pursuant to the emergency declaration under the [facility], [facility] waiver authority and the [employer] and [employer] Act. Patient identification was verified, and a caregiver was present when appropriate. The patient was located in a state where the provider was licensed to provide care.        Chief Complaint   Patient presents with    Morbid Obesity     Annual visit, S/P Laparoscopic sleeve gastrectomy with esophagogastroduodenoscopy 8/24/21, [phone] [facility] [place] [facility] status post laparoscopic sleeve gastrectomy for treatment of morbid obesity. She is doing great. She has no concerns. Says she feels well and she is settled into a new normal.  Her only time that she typically feels hungry is first thing in the morning, so she makes an effort to get in good protein such as a yogurt and a couple slices of turkey lazo. She has a carb monitoring jose luis on her phone which she utilizes to try to avoid added sugars and higher carb items. She has no reflux  Bowels are moving most days  She is sleeping well  She is taking bariatric pal multivitamin with iron as well as a separate calcium citrate twice daily  Occasional alcohol  Exercising by going to the gym several times a week  She said she is very comfortable at her current weight  She is going to school for phlebotomy    Preop weight 246 pounds  Current weight 190 pounds      Co-Morbid(s)     Was anti coagulation initiated for presumed / confirmed DVT/PE? NO    Was an incisional hernia noted on exam?       NO      COMORBIDITY     SLEEP APNEA                 NO        GERD  (req.meds)           NO  HYPERLIPIDEMIA           NO  HYPERTENSION             NO       IF YES, # OF HTN MEDICATIONS 0  DIABETES                        NO      IF YES, 0 NON-INSULIN   0 INSULIN     Visit Vitals  Wt 190 lb (86.2 kg)   BMI 31.62 kg/m²     She looks well and healthy  Speech is clear breathing is unlabored  Mucous membranes appear moist  She is ambulating independently    ICD-10-CM ICD-9-CM    1. Obesity (BMI 30-39. 9)  E66.9 278.00       2. BMI 31.0-31.9,adult  Z68.31 V85.31       3.  S/P gastric sleeve procedure  Z90.3 V45.75         1 year status post laparoscopic sleeve gastrectomy for treatment of morbid obesity  62% excess weight loss  She had nutrition and vitamin labs in March which were excellent have advised her to continue her bariatric vitamin supplements  Limit/avoid alcohol  Continue with current behaviors with good sleep, daily activity, bariatric diet adherence and vitamin adherence  Back to basics nutrition class  Support group  Follow-up 3/3/2023 at 7:40 AM virtual visit  She will do labs prior to her next appointment  [address] [place] [address] Drive verbalized understanding and questions were answered to the best of my knowledge and ability. Diet, activity educational materials were provided.

## 2022-09-02 NOTE — PATIENT INSTRUCTIONS
Your next appointment is 3/3/2023 at 7:40 AM.  This a virtual visit. If you do need to reschedule please call [phone]. Labs prior to next appointment and please include:    CBC, CMP, iron profile, B12, folate, and vitamin D, 25    Back to Basics Bariatric Nutrition. Virtual class with Jane Fox RD. Matti Lanza will reach out to you via e-mail with details on date/time and Zoom link.         Keys to long term weight loss / management     -  Sleep is critical and aim for 7 hours / night     -  Drink mostly water and lots of it     -  Eat \"clean\" and avoid processed foods (ie eating out of a box or bag)     -  Move every day = walk, swim, bike, yard work, etc     -  Journal what you eat (this is a proven tool to keep us on track)

## 2023-06-07 ENCOUNTER — TELEPHONE (OUTPATIENT)
Age: 43
End: 2023-06-07

## 2023-06-07 NOTE — TELEPHONE ENCOUNTER
Patient called stating that forms were sent over to make it so patient can donate plasma. Patient stated the company, [employer], informed her that they received what was requested but there was an attached form that was supposed to be filled out by Dr. Alla dee. Patient stated she wanted to know if the form could be filled out and sent back in to the company.

## 2023-06-07 NOTE — TELEPHONE ENCOUNTER
Returned call to Ms Leggett verified patient with 2 patient identifiers. I informed patient the form was placed in the records basket. I will follow up with the record handler regarding the form.

## 2023-07-10 ENCOUNTER — TELEPHONE (OUTPATIENT)
Age: 43
End: 2023-07-10

## 2023-07-10 NOTE — TELEPHONE ENCOUNTER
LM for pt to call and schedule annual bariatric exam. Letter sent.  [facility]

## 2024-07-22 ENCOUNTER — TELEPHONE (OUTPATIENT)
Age: 44
End: 2024-07-22

## 2024-07-22 NOTE — TELEPHONE ENCOUNTER
LM for pt to call and schedule annual bariatric exam.  Letter sent. [facility]

## 2025-04-30 ENCOUNTER — OFFICE VISIT (OUTPATIENT)
Age: 45
End: 2025-04-30

## 2025-04-30 VITALS
OXYGEN SATURATION: 99 % | TEMPERATURE: 98.7 F | HEART RATE: 82 BPM | BODY MASS INDEX: 36.94 KG/M2 | WEIGHT: 222 LBS | SYSTOLIC BLOOD PRESSURE: 108 MMHG | RESPIRATION RATE: 18 BRPM | DIASTOLIC BLOOD PRESSURE: 74 MMHG

## 2025-04-30 DIAGNOSIS — J06.9 VIRAL UPPER RESPIRATORY TRACT INFECTION: Primary | ICD-10-CM

## 2025-04-30 LAB
INFLUENZA A ANTIGEN, POC: NEGATIVE
INFLUENZA B ANTIGEN, POC: NEGATIVE
Lab: NORMAL
PERFORMING INSTRUMENT: NORMAL
QC PASS/FAIL: NORMAL
SARS-COV-2, POC: NORMAL

## 2025-04-30 RX ORDER — THIAMINE HCL 100 MG
TABLET ORAL DAILY
COMMUNITY
End: 2025-04-30

## 2025-04-30 RX ORDER — BENZONATATE 100 MG/1
200 CAPSULE ORAL 3 TIMES DAILY PRN
Qty: 30 CAPSULE | Refills: 0 | Status: SHIPPED | OUTPATIENT
Start: 2025-04-30 | End: 2025-05-10

## 2025-04-30 NOTE — PROGRESS NOTES
Patient Name: Clarisa Lazar   YOB: 1980   Patient Status: New patient,   Chief Complaint: Headache (Pt present today C/c  headache, body ache, runny nose, fatigue.  Onset (04-)  temp on (04-) (100))      ____________________________________________________________________________________________    External Records Reviewed: None    Limitation to History: None    Outside Historian: None    SUBJECTIVE/OBJECTIVE:  Clarisa Lazar is a 44 y.o. female presents with complaint of cough, congestion, fever, fatigue  headache and body aches.  Symptoms began 4 day(s) ago and show no change since onset.  Patient has been resting and NSAID The patient denies shortness of breath, chest pain, and GI Symptoms No other acute symptoms reported at this time.          PAST MEDICAL HISTORY:   Medical: Pt  has a past medical history of Abnormal Pap smear, Adverse effect of anesthesia, Chlamydia, Diabetes (HCC), Genital herpes, unspecified (2002), GERD (gastroesophageal reflux disease), Hernia, hiatal (06/18/2018), Impaired glucose tolerance, Infertility, female, PCOS (polycystic ovarian syndrome), Polycystic disease, ovaries, Psychiatric disorder, Sleep apnea, Snores, and Unspecified vitamin D deficiency.  Surgical: Pt  has a past surgical history that includes Kidney removal (Right, 2003); Cholecystectomy (07/11/2018); polypectomy (06/18/2018); Tonsillectomy (2012); other surgical history (2003); gastrectomy (08/24/2021); and other surgical history (2012).  Family: Pt family history includes Anxiety Disorder in her mother; Asthma in her sister and sister; Cancer in her paternal grandmother; Diabetes in her father and paternal grandmother; Heart Attack in her paternal uncle; Heart Disease in her paternal uncle; Hypertension in her father, mother, paternal grandmother, and sister; Kidney Disease in her father; Mult Sclerosis in her maternal grandmother; No Known Problems in her daughter; Other in

## 2025-06-16 ENCOUNTER — OFFICE VISIT (OUTPATIENT)
Age: 45
End: 2025-06-16

## 2025-06-16 VITALS
RESPIRATION RATE: 18 BRPM | BODY MASS INDEX: 37.44 KG/M2 | HEART RATE: 68 BPM | DIASTOLIC BLOOD PRESSURE: 79 MMHG | OXYGEN SATURATION: 98 % | TEMPERATURE: 98.8 F | SYSTOLIC BLOOD PRESSURE: 115 MMHG | WEIGHT: 225 LBS

## 2025-06-16 DIAGNOSIS — J20.8 ACUTE VIRAL BRONCHITIS: ICD-10-CM

## 2025-06-16 DIAGNOSIS — J01.00 ACUTE MAXILLARY SINUSITIS, RECURRENCE NOT SPECIFIED: Primary | ICD-10-CM

## 2025-06-16 RX ORDER — ALBUTEROL SULFATE 90 UG/1
2 INHALANT RESPIRATORY (INHALATION) 4 TIMES DAILY PRN
Qty: 18 G | Refills: 0 | Status: SHIPPED | OUTPATIENT
Start: 2025-06-16

## 2025-06-16 RX ORDER — INHALER, ASSIST DEVICES
1 SPACER (EA) MISCELLANEOUS PRN
Qty: 1 EACH | Refills: 0 | Status: SHIPPED | OUTPATIENT
Start: 2025-06-16

## 2025-06-16 RX ORDER — FLUTICASONE PROPIONATE 50 MCG
2 SPRAY, SUSPENSION (ML) NASAL DAILY
Qty: 16 G | Refills: 0 | Status: SHIPPED | OUTPATIENT
Start: 2025-06-16

## 2025-06-16 NOTE — PATIENT INSTRUCTIONS
Exam and history consistent with acute sinusitis and bronchitis.   Albuterol inhaler with spacer every 6 hours as needed for coughing fits/shortness of breath  Flonase 1 squirt each night, once a day for at least the next 2 weeks  Mucinex Fastmax, Tylenol severe cold and flu, or DayQuil for symptomatic relief and decongestion  Simple foods like chicken noodle soup, smoothies, hot tea with lemon and honey may also help  Steam inhalation, humidifier, warm compresses  Increase oral fluids to maintain hydration  Avoid smoking and minimize contact with environmental irritants    Please follow up with your primary care provider if your symptoms last more than 10 days or worsen.    Please go immediately to the Emergency Department if you develop:  Fever higher than 102F (38.9C), sudden and severe pain in the face and head, trouble seeing or seeing double, trouble thinking clearly, swelling or redness around one or both eyes or a stiff neck

## 2025-06-16 NOTE — PROGRESS NOTES
2025   Clarisa Lazar (: 1980) is a 44 y.o. female, Established patient, here for evaluation of the following chief complaint(s):  Fever (C/o cough,  fever, chills and sinus issues. Sx 8 days. )     ASSESSMENT/PLAN:  Below is the assessment and plan developed based on review of pertinent history, physical exam, labs, studies, and medications.  Assessment & Plan  Acute maxillary sinusitis, recurrence not specified          Exam and history consistent with acute sinusitis and bronchitis.   Albuterol inhaler with spacer every 6 hours as needed for coughing fits/shortness of breath  Flonase 1 squirt each night, once a day for at least the next 2 weeks  Mucinex Fastmax, Tylenol severe cold and flu, or DayQuil for symptomatic relief and decongestion  Simple foods like chicken noodle soup, smoothies, hot tea with lemon and honey may also help  Steam inhalation, humidifier, warm compresses  Increase oral fluids to maintain hydration  Avoid smoking and minimize contact with environmental irritants    Please follow up with your primary care provider if your symptoms last more than 10 days or worsen.    Please go immediately to the Emergency Department if you develop:  Fever higher than 102F (38.9C), sudden and severe pain in the face and head, trouble seeing or seeing double, trouble thinking clearly, swelling or redness around one or both eyes or a stiff neck    Acute viral bronchitis       Orders:    albuterol sulfate HFA (VENTOLIN HFA) 108 (90 Base) MCG/ACT inhaler; Inhale 2 puffs into the lungs 4 times daily as needed for Wheezing    Spacer/Aero-Holding Chambers (AEROCHAMBER MV) MISC; 1 applicator by Does not apply route as needed (with albuterol inhaler)          Handout given with care instructions  2. OTC for symptom management. Increase fluid intake, ensure adequate nutritional intake.  3. Follow up with PCP as needed.  4. Go to ED with development of any acute symptoms.     Follow up:  No follow-ups

## 2025-06-19 ENCOUNTER — OFFICE VISIT (OUTPATIENT)
Age: 45
End: 2025-06-19

## 2025-06-19 VITALS
HEART RATE: 90 BPM | TEMPERATURE: 98.2 F | WEIGHT: 223 LBS | BODY MASS INDEX: 37.11 KG/M2 | DIASTOLIC BLOOD PRESSURE: 80 MMHG | OXYGEN SATURATION: 98 % | SYSTOLIC BLOOD PRESSURE: 116 MMHG

## 2025-06-19 DIAGNOSIS — B96.89 ACUTE BACTERIAL SINUSITIS: Primary | ICD-10-CM

## 2025-06-19 DIAGNOSIS — J01.90 ACUTE BACTERIAL SINUSITIS: Primary | ICD-10-CM

## 2025-06-19 DIAGNOSIS — H10.33 ACUTE BACTERIAL CONJUNCTIVITIS OF BOTH EYES: ICD-10-CM

## 2025-06-19 RX ORDER — CETIRIZINE HYDROCHLORIDE, PSEUDOEPHEDRINE HYDROCHLORIDE 5; 120 MG/1; MG/1
1 TABLET, FILM COATED, EXTENDED RELEASE ORAL 2 TIMES DAILY
Qty: 60 TABLET | Refills: 0 | Status: SHIPPED | OUTPATIENT
Start: 2025-06-19 | End: 2025-06-19

## 2025-06-19 RX ORDER — CETIRIZINE HYDROCHLORIDE, PSEUDOEPHEDRINE HYDROCHLORIDE 5; 120 MG/1; MG/1
1 TABLET, FILM COATED, EXTENDED RELEASE ORAL 2 TIMES DAILY
Qty: 60 TABLET | Refills: 0 | Status: SHIPPED | OUTPATIENT
Start: 2025-06-19 | End: 2025-07-19

## 2025-06-19 RX ORDER — POLYMYXIN B SULFATE AND TRIMETHOPRIM 1; 10000 MG/ML; [USP'U]/ML
1 SOLUTION OPHTHALMIC EVERY 4 HOURS
Qty: 3 ML | Refills: 0 | Status: SHIPPED | OUTPATIENT
Start: 2025-06-19 | End: 2025-06-29

## 2025-06-19 NOTE — PATIENT INSTRUCTIONS
Take the antibiotics as prescribed.  Continue with flonase and saline rinsing of your nose.  Sleep with head elevated.  Zyrtec D for several days for congestion.      If the doctor prescribed antibiotics, take them as directed. Do not stop taking them just because you feel better. You need to take the full course of antibiotics.  Sinus infections are usually self-limiting and do not require antibiotic therapy.   Use saline (saltwater) nasal washes. This can help keep your nasal passages open and wash out mucus and allergens.  You can buy saline nose washes at a grocery store or drugstore. Follow the instructions on the package.  Try a decongestant nasal spray like oxymetazoline (Afrin) Do not use it for more than 3 days in a row. Using it for more than 3 days can make your congestion worse.  If needed, take an over-the-counter pain medicine, such as acetaminophen (Tylenol), ibuprofen (Advil, Motrin), or naproxen (Aleve). Read and follow all instructions on the label.  Be careful when taking over-the-counter cold or influenza (flu) medicines and Tylenol at the same time. Many of these medicines have acetaminophen, which is Tylenol. Read the labels to make sure that you are not taking more than the recommended dose. Too much acetaminophen (Tylenol) can be harmful.  Try a steroid nasal spray daily such as Flonase as well as antihistamine such as Claritin or Zyrtec.   Breathe warm, moist air. You can use a steamy shower, a hot bath, or a sink filled with hot water. Avoid cold, dry air. Using a humidifier in your home may help. Follow the directions for cleaning the machine.  Do not smoke or allow others to smoke around you. If you need help quitting, talk to your doctor about stop-smoking programs and medicines. These can increase your chances of quitting for good.  Return to Clinic if mild worsening or changes in symptoms.  Report to ER if high or persistent fever, dehydration, new or severe worsening of symptoms.

## 2025-06-19 NOTE — PROGRESS NOTES
Patient Name: Clarisa Lazar   YOB: 1980   Patient Status: Established patient,   Chief Complaint: Sinusitis (Pt present today follow up sinus infection.  In clinic on (06-))      ____________________________________________________________________________________________    External Records Reviewed: None    Limitation to History: None    Outside Historian: None    SUBJECTIVE/OBJECTIVE:  Clarisa Lazar is a 44 y.o. female presents with complaint of bilateral eye drainage, headache, stuffy nose, sore throat, cough, and sinus pain with pressure. Eye symptoms began yesterday with sticky, copious green drainage when she awoke and.all other symptoms began 11 days ago.  She was seen in  for same and diagnosed with sinusitis and bronchitis.  She was given flonase and albuterol.  She reports she is no better.  No other acute symptoms reported at this time.          PAST MEDICAL HISTORY:   Medical: Pt  has a past medical history of Abnormal Pap smear, Adverse effect of anesthesia, Chlamydia, Diabetes (HCC), Genital herpes, unspecified (2002), GERD (gastroesophageal reflux disease), Hernia, hiatal (06/18/2018), Impaired glucose tolerance, Infertility, female, PCOS (polycystic ovarian syndrome), Polycystic disease, ovaries, Psychiatric disorder, Sleep apnea, Snores, and Unspecified vitamin D deficiency.  Surgical: Pt  has a past surgical history that includes Kidney removal (Right, 2003); Cholecystectomy (07/11/2018); polypectomy (06/18/2018); Tonsillectomy (2012); other surgical history (2003); gastrectomy (08/24/2021); and other surgical history (2012).  Family: Pt family history includes Anxiety Disorder in her mother; Asthma in her sister and sister; Cancer in her paternal grandmother; Diabetes in her father and paternal grandmother; Heart Attack in her paternal uncle; Heart Disease in her paternal uncle; Hypertension in her father, mother, paternal grandmother, and sister; Kidney Disease

## 2025-06-20 ASSESSMENT — VISUAL ACUITY: OU: 1

## 2025-07-08 DIAGNOSIS — J01.00 ACUTE MAXILLARY SINUSITIS, RECURRENCE NOT SPECIFIED: ICD-10-CM

## 2025-07-13 RX ORDER — FLUTICASONE PROPIONATE 50 MCG
2 SPRAY, SUSPENSION (ML) NASAL DAILY
Qty: 48 ML | Refills: 1 | OUTPATIENT
Start: 2025-07-13

## (undated) DEVICE — CLIP APPLIER: Brand: ENDO CLIP

## (undated) DEVICE — 1200 GUARD II KIT W/5MM TUBE W/O VAC TUBE: Brand: GUARDIAN

## (undated) DEVICE — Z INACTIVE USE 2240337 DRAPE SURG PT TRANSFER TRAWAY SHT

## (undated) DEVICE — VISUALIZATION SYSTEM: Brand: CLEARIFY

## (undated) DEVICE — (D)PREP SKN CHLRAPRP APPL 26ML -- CONVERT TO ITEM 371833

## (undated) DEVICE — VISIGI 3D®  CALIBRATION SYSTEM  SIZE 40FR STD W/ BULB: Brand: BOEHRINGER® VISIGI 3D™ SLEEVE GASTRECTOMY CALIBRATION SYSTEM, SIZE 40FR W/BULB

## (undated) DEVICE — SURGICAL PROCEDURE KIT GEN LAPAROSCOPY LF

## (undated) DEVICE — FILTER SMK EVAC FLO CLR MEGADYNE

## (undated) DEVICE — REINFORCED INTELLIGENT RELOAD, FOR USE WITH SIGNIA STAPLING SYSTEM: Brand: TRI-STAPLE 2.0

## (undated) DEVICE — DEVON™ KNEE AND BODY STRAP 60" X 3" (1.5 M X 7.6 CM): Brand: DEVON

## (undated) DEVICE — INFECTION CONTROL KIT SYS

## (undated) DEVICE — TROCAR SITE CLOSURE DEVICE: Brand: ENDO CLOSE

## (undated) DEVICE — DISSECTOR CRV JAW 48CM CRDLS -- SONICISION

## (undated) DEVICE — 4-PORT MANIFOLD: Brand: NEPTUNE 2

## (undated) DEVICE — SYR 10ML LUER LOK 1/5ML GRAD --

## (undated) DEVICE — HANDLE LT SNAP ON ULT DURABLE LENS FOR TRUMPF ALC DISPOSABLE

## (undated) DEVICE — STERILE POLYISOPRENE POWDER-FREE SURGICAL GLOVES: Brand: PROTEXIS

## (undated) DEVICE — SOLUTION IV 500ML 0.9% SOD CHL FLX CONT

## (undated) DEVICE — SYRINGE MED 20ML STD CLR PLAS LUERLOCK TIP N CTRL DISP

## (undated) DEVICE — DERMABOND SKIN ADH 0.7ML -- DERMABOND ADVANCED 12/BX

## (undated) DEVICE — SUTURE SZ 0 27IN 5/8 CIR UR-6  TAPER PT VIOLET ABSRB VICRYL J603H

## (undated) DEVICE — Device

## (undated) DEVICE — BLADELESS OPTICAL TROCAR WITH FIXATION CANNULA: Brand: VERSAPORT

## (undated) DEVICE — ELECTRODE ES 36CM LAP FLAT L HK COAT DISP CLEANCOAT

## (undated) DEVICE — KENDALL SCD EXPRESS SLEEVES, KNEE LENGTH, MEDIUM: Brand: KENDALL SCD

## (undated) DEVICE — TUBING, SUCTION, 1/4" X 12', STRAIGHT: Brand: MEDLINE

## (undated) DEVICE — GOWN,SIRUS,NONRNF,SETINSLV,2XL,18/CS: Brand: MEDLINE

## (undated) DEVICE — GENERAL LAPAROSCOPY - SMH: Brand: MEDLINE INDUSTRIES, INC.

## (undated) DEVICE — BNDG ADH FABRIC 2X4IN ST LF --

## (undated) DEVICE — LAPAROSCOPIC TROCAR SLEEVE/SINGLE USE: Brand: KII® OPTICAL ACCESS SYSTEM

## (undated) DEVICE — TROCAR: Brand: KII® SLEEVE

## (undated) DEVICE — SYR 50ML LR LCK 1ML GRAD NSAF --

## (undated) DEVICE — CLICKLINE SCISSORS INSERT: Brand: CLICKLINE

## (undated) DEVICE — NEEDLE HYPO 22GA L1.5IN BLK S STL HUB POLYPR SHLD REG BVL

## (undated) DEVICE — SUTURE MCRYL SZ 4-0 L27IN ABSRB UD L19MM PS-2 1/2 CIR PRIM Y426H

## (undated) DEVICE — REM POLYHESIVE ADULT PATIENT RETURN ELECTRODE: Brand: VALLEYLAB

## (undated) DEVICE — TROCARS: Brand: KII® OPTICAL ACCESS SYSTEM

## (undated) DEVICE — DRAPE,UTILTY,TAPE,15X26, 4EA/PK: Brand: MEDLINE

## (undated) DEVICE — DRAPE XR C ARM 41X74IN LF --

## (undated) DEVICE — SUTURE MCRYL SZ 4-0 L27IN ABSRB UD L24MM PS-1 3/8 CIR PRIM Y935H

## (undated) DEVICE — COVER LT HNDL PLAS RIG 1 PER PK

## (undated) DEVICE — BLACK REINFORCED INTELLIGENT RELOAD, FOR USE WITH SIGNIA STAPLING SYSTEM: Brand: TRI-STAPLE 2.0

## (undated) DEVICE — BLADELESS OPTICAL TROCAR WITH FIXATION CANNULA: Brand: VERSAONE

## (undated) DEVICE — AIR SHEET,LAT,COMFORT GLIDE, BLEND 40X80: Brand: MEDLINE

## (undated) DEVICE — GLOVE SURG SZ 8 L12IN FNGR THK79MIL GRN LTX FREE

## (undated) DEVICE — SUTURE DEV SZ 2-0 WND CLSR ABSRB GS-22 VLOC COVIDIEN VLOCM2145

## (undated) DEVICE — Z DISCONTINUED NO SUB IDED SET EXTN W/ 4 W STPCOCK M SPIN LOK 36IN

## (undated) DEVICE — SOLUTION IRRIG 1000ML 0.9% SOD CHL USP POUR PLAS BTL

## (undated) DEVICE — POWER SHELL: Brand: SIGNIA

## (undated) DEVICE — APPLICATOR BNDG 1MM ADH PREMIERPRO EXOFIN

## (undated) DEVICE — GLOVE ORANGE PI 7 1/2   MSG9075

## (undated) DEVICE — SET CHOLANGIOGRAPHY 4FR L60CM W/ ARW KARLAN BLLN CATH CRV

## (undated) DEVICE — SUTURE ETHLN SZ 2-0 L18IN NONABSORBABLE BLK L19MM PS-2 PRIM 593H

## (undated) DEVICE — BAG SPEC RETRV 275ML 10ML DISPOSABLE RELIACATCH

## (undated) DEVICE — GOWN,SIRUS,FABRNF,XL,20/CS: Brand: MEDLINE

## (undated) DEVICE — GARMENT,MEDLINE,DVT,INT,CALF,MED, GEN2: Brand: MEDLINE

## (undated) DEVICE — ENDO CARRY-ON PROCEDURE KIT INCLUDES ENZYMATIC SPONGE, GAUZE, BIOHAZARD LABEL, TRAY, LUBRICANT, DIRTY SCOPE LABEL, WATER LABEL, TRAY, DRAWSTRING PAD, AND DEFENDO 4-PIECE KIT.: Brand: ENDO CARRY-ON PROCEDURE KIT